# Patient Record
Sex: FEMALE | Race: WHITE | NOT HISPANIC OR LATINO
[De-identification: names, ages, dates, MRNs, and addresses within clinical notes are randomized per-mention and may not be internally consistent; named-entity substitution may affect disease eponyms.]

---

## 2017-01-03 ENCOUNTER — APPOINTMENT (OUTPATIENT)
Dept: CARDIOLOGY | Facility: CLINIC | Age: 57
End: 2017-01-03

## 2017-01-12 ENCOUNTER — MEDICATION RENEWAL (OUTPATIENT)
Age: 57
End: 2017-01-12

## 2017-02-07 ENCOUNTER — NON-APPOINTMENT (OUTPATIENT)
Age: 57
End: 2017-02-07

## 2017-02-07 ENCOUNTER — APPOINTMENT (OUTPATIENT)
Dept: CARDIOLOGY | Facility: CLINIC | Age: 57
End: 2017-02-07

## 2017-02-07 VITALS — SYSTOLIC BLOOD PRESSURE: 128 MMHG | DIASTOLIC BLOOD PRESSURE: 88 MMHG

## 2017-02-07 VITALS
SYSTOLIC BLOOD PRESSURE: 141 MMHG | HEIGHT: 61 IN | DIASTOLIC BLOOD PRESSURE: 101 MMHG | HEART RATE: 128 BPM | WEIGHT: 96 LBS | BODY MASS INDEX: 18.12 KG/M2 | OXYGEN SATURATION: 97 %

## 2017-02-20 ENCOUNTER — RX RENEWAL (OUTPATIENT)
Age: 57
End: 2017-02-20

## 2017-02-28 ENCOUNTER — APPOINTMENT (OUTPATIENT)
Dept: CARDIOLOGY | Facility: CLINIC | Age: 57
End: 2017-02-28

## 2017-03-24 ENCOUNTER — OUTPATIENT (OUTPATIENT)
Dept: OUTPATIENT SERVICES | Facility: HOSPITAL | Age: 57
LOS: 1 days | End: 2017-03-24
Payer: MEDICARE

## 2017-03-24 DIAGNOSIS — Z98.89 OTHER SPECIFIED POSTPROCEDURAL STATES: Chronic | ICD-10-CM

## 2017-03-24 DIAGNOSIS — R63.4 ABNORMAL WEIGHT LOSS: ICD-10-CM

## 2017-03-24 DIAGNOSIS — Z90.49 ACQUIRED ABSENCE OF OTHER SPECIFIED PARTS OF DIGESTIVE TRACT: Chronic | ICD-10-CM

## 2017-03-24 DIAGNOSIS — K57.30 DIVERTICULOSIS OF LARGE INTESTINE WITHOUT PERFORATION OR ABSCESS WITHOUT BLEEDING: ICD-10-CM

## 2017-03-24 DIAGNOSIS — R11.2 NAUSEA WITH VOMITING, UNSPECIFIED: ICD-10-CM

## 2017-03-24 PROCEDURE — G0121: CPT

## 2017-04-13 ENCOUNTER — APPOINTMENT (OUTPATIENT)
Dept: CARDIOLOGY | Facility: CLINIC | Age: 57
End: 2017-04-13

## 2017-06-27 ENCOUNTER — RX RENEWAL (OUTPATIENT)
Age: 57
End: 2017-06-27

## 2017-12-18 ENCOUNTER — RX RENEWAL (OUTPATIENT)
Age: 57
End: 2017-12-18

## 2018-02-23 ENCOUNTER — EMERGENCY (EMERGENCY)
Facility: HOSPITAL | Age: 58
LOS: 1 days | Discharge: HOME | End: 2018-02-23
Attending: EMERGENCY MEDICINE | Admitting: EMERGENCY MEDICINE
Payer: MEDICARE

## 2018-02-23 VITALS
TEMPERATURE: 99 F | RESPIRATION RATE: 16 BRPM | OXYGEN SATURATION: 97 % | HEART RATE: 97 BPM | SYSTOLIC BLOOD PRESSURE: 147 MMHG | WEIGHT: 102.96 LBS | DIASTOLIC BLOOD PRESSURE: 88 MMHG

## 2018-02-23 VITALS
RESPIRATION RATE: 15 BRPM | HEART RATE: 72 BPM | DIASTOLIC BLOOD PRESSURE: 75 MMHG | TEMPERATURE: 98 F | SYSTOLIC BLOOD PRESSURE: 132 MMHG | OXYGEN SATURATION: 98 %

## 2018-02-23 DIAGNOSIS — Z98.89 OTHER SPECIFIED POSTPROCEDURAL STATES: Chronic | ICD-10-CM

## 2018-02-23 DIAGNOSIS — Z90.49 ACQUIRED ABSENCE OF OTHER SPECIFIED PARTS OF DIGESTIVE TRACT: Chronic | ICD-10-CM

## 2018-02-23 LAB
ALBUMIN SERPL ELPH-MCNC: 3.4 G/DL — SIGNIFICANT CHANGE UP (ref 3.3–5)
ALP SERPL-CCNC: 84 U/L — SIGNIFICANT CHANGE UP (ref 40–120)
ALT FLD-CCNC: 12 U/L — SIGNIFICANT CHANGE UP (ref 12–78)
ANION GAP SERPL CALC-SCNC: 13 MMOL/L — SIGNIFICANT CHANGE UP (ref 5–17)
AST SERPL-CCNC: 19 U/L — SIGNIFICANT CHANGE UP (ref 15–37)
BASOPHILS # BLD AUTO: 0.1 K/UL — SIGNIFICANT CHANGE UP (ref 0–0.2)
BASOPHILS NFR BLD AUTO: 1.4 % — SIGNIFICANT CHANGE UP (ref 0–2)
BILIRUB SERPL-MCNC: 0.3 MG/DL — SIGNIFICANT CHANGE UP (ref 0.2–1.2)
BUN SERPL-MCNC: 11 MG/DL — SIGNIFICANT CHANGE UP (ref 7–23)
CALCIUM SERPL-MCNC: 7.9 MG/DL — LOW (ref 8.5–10.1)
CHLORIDE SERPL-SCNC: 112 MMOL/L — HIGH (ref 96–108)
CO2 SERPL-SCNC: 26 MMOL/L — SIGNIFICANT CHANGE UP (ref 22–31)
CREAT SERPL-MCNC: 0.48 MG/DL — LOW (ref 0.5–1.3)
EOSINOPHIL # BLD AUTO: 0.1 K/UL — SIGNIFICANT CHANGE UP (ref 0–0.5)
EOSINOPHIL NFR BLD AUTO: 2.4 % — SIGNIFICANT CHANGE UP (ref 0–6)
GLUCOSE SERPL-MCNC: 96 MG/DL — SIGNIFICANT CHANGE UP (ref 70–99)
HCT VFR BLD CALC: 37.8 % — SIGNIFICANT CHANGE UP (ref 34.5–45)
HGB BLD-MCNC: 12.7 G/DL — SIGNIFICANT CHANGE UP (ref 11.5–15.5)
LYMPHOCYTES # BLD AUTO: 1.6 K/UL — SIGNIFICANT CHANGE UP (ref 1–3.3)
LYMPHOCYTES # BLD AUTO: 39.6 % — SIGNIFICANT CHANGE UP (ref 13–44)
MCHC RBC-ENTMCNC: 30.8 PG — SIGNIFICANT CHANGE UP (ref 27–34)
MCHC RBC-ENTMCNC: 33.6 GM/DL — SIGNIFICANT CHANGE UP (ref 32–36)
MCV RBC AUTO: 91.6 FL — SIGNIFICANT CHANGE UP (ref 80–100)
MONOCYTES # BLD AUTO: 0.3 K/UL — SIGNIFICANT CHANGE UP (ref 0–0.9)
MONOCYTES NFR BLD AUTO: 6.9 % — SIGNIFICANT CHANGE UP (ref 1–9)
NEUTROPHILS # BLD AUTO: 2 K/UL — SIGNIFICANT CHANGE UP (ref 1.8–7.4)
NEUTROPHILS NFR BLD AUTO: 49.8 % — SIGNIFICANT CHANGE UP (ref 43–77)
PLATELET # BLD AUTO: 249 K/UL — SIGNIFICANT CHANGE UP (ref 150–400)
POTASSIUM SERPL-MCNC: 4 MMOL/L — SIGNIFICANT CHANGE UP (ref 3.5–5.3)
POTASSIUM SERPL-SCNC: 4 MMOL/L — SIGNIFICANT CHANGE UP (ref 3.5–5.3)
PROT SERPL-MCNC: 6.4 G/DL — SIGNIFICANT CHANGE UP (ref 6–8.3)
RBC # BLD: 4.13 M/UL — SIGNIFICANT CHANGE UP (ref 3.8–5.2)
RBC # FLD: 11.9 % — SIGNIFICANT CHANGE UP (ref 10.3–14.5)
SODIUM SERPL-SCNC: 151 MMOL/L — HIGH (ref 135–145)
WBC # BLD: 3.9 K/UL — SIGNIFICANT CHANGE UP (ref 3.8–10.5)
WBC # FLD AUTO: 3.9 K/UL — SIGNIFICANT CHANGE UP (ref 3.8–10.5)

## 2018-02-23 PROCEDURE — 70450 CT HEAD/BRAIN W/O DYE: CPT

## 2018-02-23 PROCEDURE — 96375 TX/PRO/DX INJ NEW DRUG ADDON: CPT

## 2018-02-23 PROCEDURE — 36415 COLL VENOUS BLD VENIPUNCTURE: CPT

## 2018-02-23 PROCEDURE — 96374 THER/PROPH/DIAG INJ IV PUSH: CPT

## 2018-02-23 PROCEDURE — 85027 COMPLETE CBC AUTOMATED: CPT

## 2018-02-23 PROCEDURE — 70450 CT HEAD/BRAIN W/O DYE: CPT | Mod: 26

## 2018-02-23 PROCEDURE — 96361 HYDRATE IV INFUSION ADD-ON: CPT

## 2018-02-23 PROCEDURE — 99284 EMERGENCY DEPT VISIT MOD MDM: CPT | Mod: 25

## 2018-02-23 PROCEDURE — 80053 COMPREHEN METABOLIC PANEL: CPT

## 2018-02-23 PROCEDURE — 99284 EMERGENCY DEPT VISIT MOD MDM: CPT

## 2018-02-23 RX ORDER — KETOROLAC TROMETHAMINE 30 MG/ML
30 SYRINGE (ML) INJECTION ONCE
Qty: 0 | Refills: 0 | Status: DISCONTINUED | OUTPATIENT
Start: 2018-02-23 | End: 2018-02-23

## 2018-02-23 RX ORDER — KETOROLAC TROMETHAMINE 30 MG/ML
15 SYRINGE (ML) INJECTION ONCE
Qty: 0 | Refills: 0 | Status: DISCONTINUED | OUTPATIENT
Start: 2018-02-23 | End: 2018-02-23

## 2018-02-23 RX ORDER — ACETAMINOPHEN 500 MG
650 TABLET ORAL ONCE
Qty: 0 | Refills: 0 | Status: COMPLETED | OUTPATIENT
Start: 2018-02-23 | End: 2018-02-23

## 2018-02-23 RX ORDER — METOCLOPRAMIDE HCL 10 MG
10 TABLET ORAL ONCE
Qty: 0 | Refills: 0 | Status: COMPLETED | OUTPATIENT
Start: 2018-02-23 | End: 2018-02-23

## 2018-02-23 RX ORDER — SODIUM CHLORIDE 9 MG/ML
1000 INJECTION INTRAMUSCULAR; INTRAVENOUS; SUBCUTANEOUS ONCE
Qty: 0 | Refills: 0 | Status: COMPLETED | OUTPATIENT
Start: 2018-02-23 | End: 2018-02-23

## 2018-02-23 RX ORDER — METOCLOPRAMIDE HCL 10 MG
10 TABLET ORAL ONCE
Qty: 0 | Refills: 0 | Status: DISCONTINUED | OUTPATIENT
Start: 2018-02-23 | End: 2018-02-23

## 2018-02-23 RX ADMIN — Medication 650 MILLIGRAM(S): at 18:29

## 2018-02-23 RX ADMIN — Medication 125 MILLIGRAM(S): at 19:50

## 2018-02-23 RX ADMIN — Medication 10 MILLIGRAM(S): at 18:29

## 2018-02-23 RX ADMIN — SODIUM CHLORIDE 1000 MILLILITER(S): 9 INJECTION INTRAMUSCULAR; INTRAVENOUS; SUBCUTANEOUS at 18:29

## 2018-02-23 RX ADMIN — Medication 15 MILLIGRAM(S): at 19:50

## 2018-02-23 NOTE — ED PROVIDER NOTE - PLAN OF CARE
56 y/o female with extensive PMHx presents with c/o migraine x 1 week. Vomiting multiple times. Neurologically intact. Discussed with Dr. Villagomez, CT, labs, medication, and re-assess

## 2018-02-23 NOTE — ED PROVIDER NOTE - CARE PLAN
Assessment and plan of treatment:	58 y/o female with extensive PMHx presents with c/o migraine x 1 week. Vomiting multiple times. Neurologically intact. Discussed with Dr. Villagomez, CT, labs, medication, and re-assess Principal Discharge DX:	Migraine headache  Assessment and plan of treatment:	58 y/o female with extensive PMHx presents with c/o migraine x 1 week. Vomiting multiple times. Neurologically intact. Discussed with Dr. Villagomez, CT, labs, medication, and re-assess

## 2018-02-23 NOTE — ED PROVIDER NOTE - CRANIAL NERVE AND PUPILLARY EXAM
corneal reflex intact/peripheral vision intact/central vision intact/cranial nerves 2-12 intact/extra-ocular movements intact/tongue is midline

## 2018-02-23 NOTE — ED PROVIDER NOTE - OBJECTIVE STATEMENT
56 y/o female with PMHx of MVP, migraine, fibromyalgia, OA, neurogenic bladder, Hashimoto, gastroparesis, cushing, and DM presents today with c/o headache x 1 week. pt states she follows with Dr. Leeann Stauffer in which was diagnosed with migraines, last MRi years ago. pt describes generalized head ache sharp, worse with light, and currently 8/10. pt admits to taking zomig nasal spray for pain. pt states she hasn't received her botox injection. pt admits to vomiting multiple times but uncertain if from gastroparesis. pt denies fever, chills, LOC, seizure, neck stiffness, extremity numbness/weakness, syncope, Cp, SOB, or any other complaints.

## 2018-02-23 NOTE — ED ADULT NURSE NOTE - OBJECTIVE STATEMENT
Pt. is presenting to the ED with a migraine that has lasted a week. Denies vision changes or head injury. Pt. reports that she took her intranasal migraine medication(zonig) but that it is not helping break the migraine. Her neurologist advised her to come be seen in the emergency room. Patient reports two episodes of vomiting this morning and is light sensitive. Has a hx of gastroparesis, type two diabetes, adrenal insufficiency and thyroid disorder.

## 2018-02-23 NOTE — ED PROVIDER NOTE - MEDICAL DECISION MAKING DETAILS
58 y/o female with extensive PMHx presents with c/o migraine x 1 week. Vomiting multiple times. Neurologically intact. Ct scan normal. Pt admits to significant improvement of pain. Neurologically intact.  Pt educated on nature of condition. pt aware of symptoms to be cautious of warranting immediate return.

## 2018-02-23 NOTE — ED PROVIDER NOTE - ATTENDING CONTRIBUTION TO CARE
56 yo female hx of migraines c/o headache from all her stress (family member admitted to hospital) x 1 week, follows up with neurologist Leeann Stauffer, took her zomig without much relief.  No fever/chills, +photophobia.  No neck stiffness.  +nausea, no vomiting.  AAOx3, CTA b/l, RRR, Neuro/motor intact.  CT head negative.  Patient symptoms improved with reglan, solumedrol and toradol, will f/u with Dr. Stauffer.

## 2018-02-23 NOTE — ED ADULT NURSE NOTE - PMH
Acute kidney injury    Cervical radiculopathy    Cushing's syndrome    Diabetes    Fibromyalgia    Gastroparesis    GERD (gastroesophageal reflux disease)    Hypothyroidism    Kidney stone  left  Migraine    MVP (mitral valve prolapse)    Neurogenic bladder    Osteoarthritis    Raynaud's disease    Reflex sympathetic dystrophy    Thyroiditis  hashimottos  TMJ (dislocation of temporomandibular joint)

## 2018-02-23 NOTE — ED PROVIDER NOTE - CHPI ED SYMPTOMS NEG
no change in level of consciousness/no confusion/no fever/no blurred vision/no numbness/no dizziness

## 2018-03-22 ENCOUNTER — APPOINTMENT (OUTPATIENT)
Dept: CARDIOLOGY | Facility: CLINIC | Age: 58
End: 2018-03-22

## 2018-06-05 ENCOUNTER — OUTPATIENT (OUTPATIENT)
Dept: OUTPATIENT SERVICES | Facility: HOSPITAL | Age: 58
LOS: 1 days | Discharge: HOME | End: 2018-06-05
Payer: MEDICARE

## 2018-06-05 ENCOUNTER — RESULT REVIEW (OUTPATIENT)
Age: 58
End: 2018-06-05

## 2018-06-05 DIAGNOSIS — Z90.49 ACQUIRED ABSENCE OF OTHER SPECIFIED PARTS OF DIGESTIVE TRACT: Chronic | ICD-10-CM

## 2018-06-05 DIAGNOSIS — R63.4 ABNORMAL WEIGHT LOSS: ICD-10-CM

## 2018-06-05 DIAGNOSIS — Z98.89 OTHER SPECIFIED POSTPROCEDURAL STATES: Chronic | ICD-10-CM

## 2018-06-05 PROCEDURE — 88305 TISSUE EXAM BY PATHOLOGIST: CPT | Mod: 26

## 2018-06-05 PROCEDURE — 43239 EGD BIOPSY SINGLE/MULTIPLE: CPT

## 2018-06-05 PROCEDURE — 88312 SPECIAL STAINS GROUP 1: CPT

## 2018-06-05 PROCEDURE — 88312 SPECIAL STAINS GROUP 1: CPT | Mod: 26

## 2018-06-05 PROCEDURE — 88305 TISSUE EXAM BY PATHOLOGIST: CPT

## 2018-06-05 PROCEDURE — 45380 COLONOSCOPY AND BIOPSY: CPT | Mod: PT

## 2018-06-07 LAB — SURGICAL PATHOLOGY STUDY: SIGNIFICANT CHANGE UP

## 2018-08-22 ENCOUNTER — TRANSCRIPTION ENCOUNTER (OUTPATIENT)
Age: 58
End: 2018-08-22

## 2018-11-02 ENCOUNTER — OUTPATIENT (OUTPATIENT)
Dept: OUTPATIENT SERVICES | Facility: HOSPITAL | Age: 58
LOS: 1 days | End: 2018-11-02
Payer: MEDICARE

## 2018-11-02 ENCOUNTER — RESULT REVIEW (OUTPATIENT)
Age: 58
End: 2018-11-02

## 2018-11-02 DIAGNOSIS — K31.7 POLYP OF STOMACH AND DUODENUM: ICD-10-CM

## 2018-11-02 DIAGNOSIS — Z98.89 OTHER SPECIFIED POSTPROCEDURAL STATES: Chronic | ICD-10-CM

## 2018-11-02 DIAGNOSIS — Z90.49 ACQUIRED ABSENCE OF OTHER SPECIFIED PARTS OF DIGESTIVE TRACT: Chronic | ICD-10-CM

## 2018-11-02 PROCEDURE — 88305 TISSUE EXAM BY PATHOLOGIST: CPT | Mod: 26

## 2018-11-02 PROCEDURE — 43251 EGD REMOVE LESION SNARE: CPT

## 2018-11-02 PROCEDURE — 88312 SPECIAL STAINS GROUP 1: CPT | Mod: 26

## 2018-11-02 PROCEDURE — 88312 SPECIAL STAINS GROUP 1: CPT

## 2018-11-02 PROCEDURE — 43239 EGD BIOPSY SINGLE/MULTIPLE: CPT | Mod: XS

## 2018-11-02 PROCEDURE — 88305 TISSUE EXAM BY PATHOLOGIST: CPT

## 2018-11-02 PROCEDURE — 43237 ENDOSCOPIC US EXAM ESOPH: CPT

## 2018-11-06 LAB — SURGICAL PATHOLOGY STUDY: SIGNIFICANT CHANGE UP

## 2020-01-07 ENCOUNTER — OUTPATIENT (OUTPATIENT)
Dept: OUTPATIENT SERVICES | Facility: HOSPITAL | Age: 60
LOS: 1 days | End: 2020-01-07
Payer: MEDICARE

## 2020-01-07 ENCOUNTER — RESULT REVIEW (OUTPATIENT)
Age: 60
End: 2020-01-07

## 2020-01-07 DIAGNOSIS — K31.7 POLYP OF STOMACH AND DUODENUM: ICD-10-CM

## 2020-01-07 DIAGNOSIS — Z98.89 OTHER SPECIFIED POSTPROCEDURAL STATES: Chronic | ICD-10-CM

## 2020-01-07 DIAGNOSIS — Z90.49 ACQUIRED ABSENCE OF OTHER SPECIFIED PARTS OF DIGESTIVE TRACT: Chronic | ICD-10-CM

## 2020-01-07 PROCEDURE — 43239 EGD BIOPSY SINGLE/MULTIPLE: CPT

## 2020-01-07 PROCEDURE — 88305 TISSUE EXAM BY PATHOLOGIST: CPT

## 2020-01-07 PROCEDURE — 88305 TISSUE EXAM BY PATHOLOGIST: CPT | Mod: 26

## 2020-01-07 PROCEDURE — 88312 SPECIAL STAINS GROUP 1: CPT | Mod: 26

## 2020-01-07 PROCEDURE — 88312 SPECIAL STAINS GROUP 1: CPT

## 2020-01-08 LAB — SURGICAL PATHOLOGY STUDY: SIGNIFICANT CHANGE UP

## 2020-05-17 ENCOUNTER — TRANSCRIPTION ENCOUNTER (OUTPATIENT)
Age: 60
End: 2020-05-17

## 2020-06-09 ENCOUNTER — APPOINTMENT (OUTPATIENT)
Dept: CARDIOLOGY | Facility: CLINIC | Age: 60
End: 2020-06-09
Payer: MEDICARE

## 2020-06-09 ENCOUNTER — NON-APPOINTMENT (OUTPATIENT)
Age: 60
End: 2020-06-09

## 2020-06-09 VITALS
SYSTOLIC BLOOD PRESSURE: 168 MMHG | BODY MASS INDEX: 24.55 KG/M2 | HEART RATE: 116 BPM | OXYGEN SATURATION: 96 % | DIASTOLIC BLOOD PRESSURE: 90 MMHG | WEIGHT: 130 LBS | HEIGHT: 61 IN

## 2020-06-09 PROCEDURE — 93000 ELECTROCARDIOGRAM COMPLETE: CPT

## 2020-06-09 PROCEDURE — 99215 OFFICE O/P EST HI 40 MIN: CPT

## 2020-06-23 ENCOUNTER — APPOINTMENT (OUTPATIENT)
Dept: CARDIOLOGY | Facility: CLINIC | Age: 60
End: 2020-06-23
Payer: MEDICARE

## 2020-06-23 PROCEDURE — 93790 AMBL BP MNTR W/SW I&R: CPT

## 2020-06-24 ENCOUNTER — APPOINTMENT (OUTPATIENT)
Dept: CARDIOLOGY | Facility: CLINIC | Age: 60
End: 2020-06-24
Payer: MEDICARE

## 2020-06-24 PROCEDURE — 93306 TTE W/DOPPLER COMPLETE: CPT

## 2020-07-28 ENCOUNTER — APPOINTMENT (OUTPATIENT)
Dept: CARDIOLOGY | Facility: CLINIC | Age: 60
End: 2020-07-28

## 2020-08-03 ENCOUNTER — APPOINTMENT (OUTPATIENT)
Dept: CARDIOLOGY | Facility: CLINIC | Age: 60
End: 2020-08-03

## 2020-10-08 ENCOUNTER — APPOINTMENT (OUTPATIENT)
Dept: CARDIOLOGY | Facility: CLINIC | Age: 60
End: 2020-10-08
Payer: MEDICARE

## 2020-10-08 PROCEDURE — 93015 CV STRESS TEST SUPVJ I&R: CPT

## 2020-10-19 ENCOUNTER — NON-APPOINTMENT (OUTPATIENT)
Age: 60
End: 2020-10-19

## 2020-10-19 ENCOUNTER — APPOINTMENT (OUTPATIENT)
Dept: CARDIOLOGY | Facility: CLINIC | Age: 60
End: 2020-10-19
Payer: MEDICARE

## 2020-10-19 VITALS
WEIGHT: 122 LBS | OXYGEN SATURATION: 99 % | HEIGHT: 61 IN | BODY MASS INDEX: 23.03 KG/M2 | SYSTOLIC BLOOD PRESSURE: 164 MMHG | DIASTOLIC BLOOD PRESSURE: 98 MMHG | HEART RATE: 87 BPM

## 2020-10-19 PROCEDURE — 93000 ELECTROCARDIOGRAM COMPLETE: CPT

## 2020-10-19 PROCEDURE — 99215 OFFICE O/P EST HI 40 MIN: CPT

## 2020-10-19 RX ORDER — MEMANTINE HYDROCHLORIDE 28 MG/1
28 CAPSULE, EXTENDED RELEASE ORAL
Qty: 90 | Refills: 0 | Status: ACTIVE | COMMUNITY
Start: 2019-08-21

## 2020-10-22 ENCOUNTER — TRANSCRIPTION ENCOUNTER (OUTPATIENT)
Age: 60
End: 2020-10-22

## 2021-02-11 ENCOUNTER — TRANSCRIPTION ENCOUNTER (OUTPATIENT)
Age: 61
End: 2021-02-11

## 2021-07-01 ENCOUNTER — FORM ENCOUNTER (OUTPATIENT)
Age: 61
End: 2021-07-01

## 2021-07-02 ENCOUNTER — TRANSCRIPTION ENCOUNTER (OUTPATIENT)
Age: 61
End: 2021-07-02

## 2021-07-02 ENCOUNTER — RESULT REVIEW (OUTPATIENT)
Age: 61
End: 2021-07-02

## 2021-07-02 ENCOUNTER — EMERGENCY (EMERGENCY)
Facility: HOSPITAL | Age: 61
LOS: 1 days | Discharge: HOME | End: 2021-07-02
Attending: EMERGENCY MEDICINE | Admitting: EMERGENCY MEDICINE
Payer: MEDICARE

## 2021-07-02 VITALS
SYSTOLIC BLOOD PRESSURE: 119 MMHG | WEIGHT: 121.92 LBS | HEART RATE: 112 BPM | OXYGEN SATURATION: 98 % | RESPIRATION RATE: 16 BRPM | DIASTOLIC BLOOD PRESSURE: 90 MMHG | HEIGHT: 61 IN | TEMPERATURE: 96 F

## 2021-07-02 DIAGNOSIS — Z98.89 OTHER SPECIFIED POSTPROCEDURAL STATES: Chronic | ICD-10-CM

## 2021-07-02 DIAGNOSIS — Z90.49 ACQUIRED ABSENCE OF OTHER SPECIFIED PARTS OF DIGESTIVE TRACT: Chronic | ICD-10-CM

## 2021-07-02 LAB
ALBUMIN SERPL ELPH-MCNC: 2.9 G/DL — LOW (ref 3.3–5)
ALP SERPL-CCNC: 144 U/L — HIGH (ref 40–120)
ALT FLD-CCNC: 34 U/L — SIGNIFICANT CHANGE UP (ref 12–78)
ANION GAP SERPL CALC-SCNC: 9 MMOL/L — SIGNIFICANT CHANGE UP (ref 5–17)
APTT BLD: 33.5 SEC — SIGNIFICANT CHANGE UP (ref 27.5–35.5)
AST SERPL-CCNC: 13 U/L — LOW (ref 15–37)
BASOPHILS # BLD AUTO: 0.07 K/UL — SIGNIFICANT CHANGE UP (ref 0–0.2)
BASOPHILS NFR BLD AUTO: 0.7 % — SIGNIFICANT CHANGE UP (ref 0–2)
BILIRUB SERPL-MCNC: 0.5 MG/DL — SIGNIFICANT CHANGE UP (ref 0.2–1.2)
BUN SERPL-MCNC: 20 MG/DL — SIGNIFICANT CHANGE UP (ref 7–23)
CALCIUM SERPL-MCNC: 7.9 MG/DL — LOW (ref 8.5–10.1)
CHLORIDE SERPL-SCNC: 111 MMOL/L — HIGH (ref 96–108)
CO2 SERPL-SCNC: 25 MMOL/L — SIGNIFICANT CHANGE UP (ref 22–31)
CREAT SERPL-MCNC: 0.51 MG/DL — SIGNIFICANT CHANGE UP (ref 0.5–1.3)
EOSINOPHIL # BLD AUTO: 0.09 K/UL — SIGNIFICANT CHANGE UP (ref 0–0.5)
EOSINOPHIL NFR BLD AUTO: 0.9 % — SIGNIFICANT CHANGE UP (ref 0–6)
GLUCOSE SERPL-MCNC: 100 MG/DL — HIGH (ref 70–99)
HCT VFR BLD CALC: 44.2 % — SIGNIFICANT CHANGE UP (ref 34.5–45)
HGB BLD-MCNC: 14.4 G/DL — SIGNIFICANT CHANGE UP (ref 11.5–15.5)
HPIV3 RNA SPEC QL NAA+PROBE: DETECTED
IMM GRANULOCYTES NFR BLD AUTO: 1.2 % — SIGNIFICANT CHANGE UP (ref 0–1.5)
INR BLD: 1.15 RATIO — SIGNIFICANT CHANGE UP (ref 0.88–1.16)
LACTATE SERPL-SCNC: 1.4 MMOL/L — SIGNIFICANT CHANGE UP (ref 0.7–2)
LIDOCAIN IGE QN: 27 U/L — LOW (ref 73–393)
LYMPHOCYTES # BLD AUTO: 1.39 K/UL — SIGNIFICANT CHANGE UP (ref 1–3.3)
LYMPHOCYTES # BLD AUTO: 14.6 % — SIGNIFICANT CHANGE UP (ref 13–44)
MAGNESIUM SERPL-MCNC: 2.3 MG/DL — SIGNIFICANT CHANGE UP (ref 1.6–2.6)
MCHC RBC-ENTMCNC: 29.4 PG — SIGNIFICANT CHANGE UP (ref 27–34)
MCHC RBC-ENTMCNC: 32.6 GM/DL — SIGNIFICANT CHANGE UP (ref 32–36)
MCV RBC AUTO: 90.4 FL — SIGNIFICANT CHANGE UP (ref 80–100)
MONOCYTES # BLD AUTO: 0.69 K/UL — SIGNIFICANT CHANGE UP (ref 0–0.9)
MONOCYTES NFR BLD AUTO: 7.2 % — SIGNIFICANT CHANGE UP (ref 2–14)
NEUTROPHILS # BLD AUTO: 7.2 K/UL — SIGNIFICANT CHANGE UP (ref 1.8–7.4)
NEUTROPHILS NFR BLD AUTO: 75.4 % — SIGNIFICANT CHANGE UP (ref 43–77)
NRBC # BLD: 0 /100 WBCS — SIGNIFICANT CHANGE UP (ref 0–0)
PLATELET # BLD AUTO: 246 K/UL — SIGNIFICANT CHANGE UP (ref 150–400)
POTASSIUM SERPL-MCNC: 3.7 MMOL/L — SIGNIFICANT CHANGE UP (ref 3.5–5.3)
POTASSIUM SERPL-SCNC: 3.7 MMOL/L — SIGNIFICANT CHANGE UP (ref 3.5–5.3)
PROCALCITONIN SERPL-MCNC: 0.09 NG/ML — HIGH (ref 0–0.04)
PROT SERPL-MCNC: 6.8 G/DL — SIGNIFICANT CHANGE UP (ref 6–8.3)
PROTHROM AB SERPL-ACNC: 13.4 SEC — SIGNIFICANT CHANGE UP (ref 10.6–13.6)
RAPID RVP RESULT: DETECTED
RBC # BLD: 4.89 M/UL — SIGNIFICANT CHANGE UP (ref 3.8–5.2)
RBC # FLD: 13.3 % — SIGNIFICANT CHANGE UP (ref 10.3–14.5)
SARS-COV-2 RNA SPEC QL NAA+PROBE: SIGNIFICANT CHANGE UP
SODIUM SERPL-SCNC: 145 MMOL/L — SIGNIFICANT CHANGE UP (ref 135–145)
WBC # BLD: 9.55 K/UL — SIGNIFICANT CHANGE UP (ref 3.8–10.5)
WBC # FLD AUTO: 9.55 K/UL — SIGNIFICANT CHANGE UP (ref 3.8–10.5)

## 2021-07-02 PROCEDURE — 71275 CT ANGIOGRAPHY CHEST: CPT | Mod: 26,MA

## 2021-07-02 PROCEDURE — 93010 ELECTROCARDIOGRAM REPORT: CPT

## 2021-07-02 PROCEDURE — 99285 EMERGENCY DEPT VISIT HI MDM: CPT

## 2021-07-02 RX ORDER — SODIUM CHLORIDE 9 MG/ML
1700 INJECTION INTRAMUSCULAR; INTRAVENOUS; SUBCUTANEOUS ONCE
Refills: 0 | Status: COMPLETED | OUTPATIENT
Start: 2021-07-02 | End: 2021-07-02

## 2021-07-02 RX ORDER — ALBUTEROL 90 UG/1
2.5 AEROSOL, METERED ORAL ONCE
Refills: 0 | Status: COMPLETED | OUTPATIENT
Start: 2021-07-02 | End: 2021-07-02

## 2021-07-02 RX ADMIN — SODIUM CHLORIDE 1700 MILLILITER(S): 9 INJECTION INTRAMUSCULAR; INTRAVENOUS; SUBCUTANEOUS at 18:43

## 2021-07-02 RX ADMIN — SODIUM CHLORIDE 1700 MILLILITER(S): 9 INJECTION INTRAMUSCULAR; INTRAVENOUS; SUBCUTANEOUS at 20:43

## 2021-07-02 RX ADMIN — ALBUTEROL 2.5 MILLIGRAM(S): 90 AEROSOL, METERED ORAL at 21:08

## 2021-07-02 NOTE — ED PROVIDER NOTE - ENMT, MLM
Airway patent, Nasal mucosa clear. Mouth with dry mucosa. Diffuse erythema noted to posterior pharynx no exudate noted

## 2021-07-02 NOTE — ED ADULT NURSE NOTE - ED CARDIAC RATE
Detail Level: Detailed
Render Note In Bullet Format When Appropriate: No
Number Of Freeze-Thaw Cycles: 2 freeze-thaw cycles
Post-Care Instructions: I reviewed with the patient in detail post-care instructions. Patient is to wear sunprotection, and avoid picking at any of the treated lesions. Pt may apply Vaseline to crusted or scabbing areas.
Duration Of Freeze Thaw-Cycle (Seconds): 10
Consent: The patient's consent was obtained including but not limited to risks of crusting, scabbing, blistering, scarring, darker or lighter pigmentary change, recurrence, incomplete removal and infection.
tachycardic

## 2021-07-02 NOTE — ED ADULT NURSE REASSESSMENT NOTE - NS ED NURSE REASSESS COMMENT FT1
Assumed care of Pt from previous RN, c/o cough, SOB, sent from urgent care for left side pneumonia, currently sitting comfortably on stretcher with 2LNC, noted with occasional cough, 02 sat 100%, will continue to monitor closely.

## 2021-07-02 NOTE — ED PROVIDER NOTE - PSH
Post-Care Instructions: I reviewed with the patient in detail post-care instructions. Patient is to wear sunprotection, and avoid picking at any of the treated lesions. Pt may apply Vaseline to crusted or scabbing areas. Add 52 Modifier (Optional): no Duration Of Freeze Thaw-Cycle (Seconds): 15 Consent: The patient's verbal consent was obtained including but not limited to risks of crusting, scabbing, blistering, scarring, darker or lighter pigmentary change, recurrence, incomplete removal and infection. Number Of Freeze-Thaw Cycles: 1 freeze-thaw cycle Render Post-Care Instructions In Note?: yes Detail Level: Zone Medical Necessity Information: It is in your best interest to select a reason for this procedure from the list below. All of these items fulfill various CMS LCD requirements except the new and changing color options. S/P cholecystectomy    S/P knee surgery     Medical Necessity Clause: This procedure was medically necessary because the lesions that were treated were:

## 2021-07-02 NOTE — ED PROVIDER NOTE - OBJECTIVE STATEMENT
Pt is a 62 yo female who presents to the ED with a cc of SOB. PMHx of Acute kidney injury, Cervical radiculopathy, Cushing's syndrome, Diabetes, Fibromyalgia    Gastroparesis, GERD (gastroesophageal reflux disease), Hypothyroidism, Kidney stone  left, Migraine, MVP (mitral valve prolapse), Neurogenic bladder    Osteoarthritis, Raynaud's disease, Reflex sympathetic dystrophy, Thyroiditis  Hashimoto's, TMJ (dislocation of temporomandibular joint). Pt reports that on Sunday she had developed an irritation to her throat. Initially she thought that she may be developing pharyngitis. On Monday she reported that she developed a hoarse voice and then on Tuesday she developed a cough productive of green/yellow sputum. Pt reports that symptoms have continued. She further notes that she has been experiencing fatigue, subjective fevers, chills, worsening of her chronic nausea, chest discomfort with breathing, and exertional SOB. She followed up with urgent care today and was sent to the ED after x-ray relieved LLL pneumonia per reports. pt has no h/o COVID 19 and was vaccinated with Moderna. Denies V/D/C, CP at rest, abd pain, ext numbness

## 2021-07-02 NOTE — ED PROVIDER NOTE - CARE PLAN
Principal Discharge DX:	Parainfluenza  Secondary Diagnosis:	Viral pneumonia  Secondary Diagnosis:	Dehydration

## 2021-07-02 NOTE — ED PROVIDER NOTE - NSFOLLOWUPINSTRUCTIONS_ED_ALL_ED_FT
Return to the ED for any new or worsening symptoms  Take your medication as prescribed  Tessalon Perles per label instructions as needed for cough  Pro Air inhaler 2 puffs every 4-6 hours as needed for cough, shortness of breath   Tylenol per label instructions as needed for fever or myalgias  Follow up with your PMD within the week for a recheck      AMBULATORY CARE:    Parainfluenza is a very contagious type of viral respiratory infection. It is caused by any of the 4 kinds of human parainfluenza viruses. These viruses can cause croup, bronchitis, bronchiolitis, ear infections, or pneumonia in adults and children. Parainfluenza is easily spread when an infected person coughs, sneezes, or has close contact with others. Anyone can get a parainfluenza infection, but they are more common in infants and young children.    Common symptoms include the following:   •Fever      •A regular cough or a rough, barking cough      •Runny or stuffy nose      •Sore throat, sneezing, or ear pain      •Hoarseness, wheezing, or rapid, noisy, or labored breathing      •Drooling or trouble swallowing      •Irritability, decreased appetite, or diarrhea      Call your local emergency number (911 in the [place]) if:   •You have trouble breathing.          Call your doctor if:   •Your symptoms get worse or do not get better with medicine.      •You have questions or concerns about your condition or care.      Medicines: You may need any of the following:   •Acetaminophen decreases pain and fever. It is available without a doctor's order. Ask how much to take and how often to take it. Follow directions. Read the labels of all other medicines you are using to see if they also contain acetaminophen, or ask your doctor or pharmacist. Acetaminophen can cause liver damage if not taken correctly. Do not use more than 4 grams (4,000 milligrams) total of acetaminophen in one day.       •NSAIDs, such as ibuprofen, help decrease swelling, pain, and fever. This medicine is available with or without a doctor's order. NSAIDs can cause stomach bleeding or kidney problems in certain people. If you take blood thinner medicine, always ask if NSAIDs are safe for you. Always read the medicine label and follow directions. Do not give these medicines to children under 6 months of age without direction from your child's healthcare provider.      •Antivirals help fight a viral infection.      •Take your medicine as directed. Contact your healthcare provider if you think your medicine is not helping or if you have side effects. Tell him of her if you are allergic to any medicine. Keep a list of the medicines, vitamins, and herbs you take. Include the amounts, and when and why you take them. Bring the list or the pill bottles to follow-up visits. Carry your medicine list with you in case of an emergency.      Manage your symptoms:   •Rest as much as you can to help you recover.      •Use a cool mist humidifier to increase air moisture in your home. This may make it easier for you to breathe and help decrease your cough. A few minutes outside in the cool night air may help your breathing. Sitting in a bathroom filled with steam from a hot shower may also be helpful.      •Drink liquids as directed to help prevent dehydration. Ask how much liquid to drink each day and which liquids are best for you.      Prevent parainfluenza:   •Wash your hands often. Use soap and water every time you wash your hands. Rub your soapy hands together, lacing your fingers. Use the fingers of one hand to scrub under the nails of the other hand. Wash for at least 20 seconds. Rinse with warm, running water for several seconds. Then dry your hands with a clean towel or paper towel. Use germ-killing hand  if soap and water are not available. Do not touch your eyes, nose, or mouth without washing your hands first.   Handwashing           •Cover a sneeze or cough. Use a tissue that covers your mouth and nose. Throw the tissue away in a trash can right away. Use the bend of your arm if a tissue is not available. Wash your hands well with soap and water or use a hand . Do not stand close to anyone who is sneezing or coughing.      •Clean shared items with a germ-killing . Clean table surfaces, doorknobs, and light switches. Do not share towels, silverware, and dishes with people who are sick. Wash bed sheets, towels, silverware, and dishes with soap and water.      •Wear a mask over your mouth and nose if you are sick. The face mask may help protect others from becoming infected. Wear the mask when you are in common areas of your home or if you seek care from a healthcare provider.      •Stay away from others if you are sick. Stay at home until 24 hours after your fever and symptoms are gone.      •Ask about the pneumococcal vaccine. Stay up to date on the pneumococcal vaccine. This will decrease your risk for developing pneumonia in addition to the parainfluenza. Ask about any other vaccines you may need.             Follow up with your doctor as directed: Write down your questions so you remember to ask them during your visits. Return to the ED for any new or worsening symptoms  Take your medication as prescribed  Tessalon Perles per label instructions as needed for cough  Pro Air inhaler 2 puffs every 4-6 hours as needed for cough, shortness of breath   Zithromax per label instructions  Make sure to remain hydrated  Tylenol per label instructions as needed for fever or myalgias  Follow up with your PMD within the week for a recheck      AMBULATORY CARE:    Parainfluenza is a very contagious type of viral respiratory infection. It is caused by any of the 4 kinds of human parainfluenza viruses. These viruses can cause croup, bronchitis, bronchiolitis, ear infections, or pneumonia in adults and children. Parainfluenza is easily spread when an infected person coughs, sneezes, or has close contact with others. Anyone can get a parainfluenza infection, but they are more common in infants and young children.    Common symptoms include the following:   •Fever      •A regular cough or a rough, barking cough      •Runny or stuffy nose      •Sore throat, sneezing, or ear pain      •Hoarseness, wheezing, or rapid, noisy, or labored breathing      •Drooling or trouble swallowing      •Irritability, decreased appetite, or diarrhea      Call your local emergency number (911 in the [place]) if:   •You have trouble breathing.          Call your doctor if:   •Your symptoms get worse or do not get better with medicine.      •You have questions or concerns about your condition or care.      Medicines: You may need any of the following:   •Acetaminophen decreases pain and fever. It is available without a doctor's order. Ask how much to take and how often to take it. Follow directions. Read the labels of all other medicines you are using to see if they also contain acetaminophen, or ask your doctor or pharmacist. Acetaminophen can cause liver damage if not taken correctly. Do not use more than 4 grams (4,000 milligrams) total of acetaminophen in one day.       •NSAIDs, such as ibuprofen, help decrease swelling, pain, and fever. This medicine is available with or without a doctor's order. NSAIDs can cause stomach bleeding or kidney problems in certain people. If you take blood thinner medicine, always ask if NSAIDs are safe for you. Always read the medicine label and follow directions. Do not give these medicines to children under 6 months of age without direction from your child's healthcare provider.      •Antivirals help fight a viral infection.      •Take your medicine as directed. Contact your healthcare provider if you think your medicine is not helping or if you have side effects. Tell him of her if you are allergic to any medicine. Keep a list of the medicines, vitamins, and herbs you take. Include the amounts, and when and why you take them. Bring the list or the pill bottles to follow-up visits. Carry your medicine list with you in case of an emergency.      Manage your symptoms:   •Rest as much as you can to help you recover.      •Use a cool mist humidifier to increase air moisture in your home. This may make it easier for you to breathe and help decrease your cough. A few minutes outside in the cool night air may help your breathing. Sitting in a bathroom filled with steam from a hot shower may also be helpful.      •Drink liquids as directed to help prevent dehydration. Ask how much liquid to drink each day and which liquids are best for you.      Prevent parainfluenza:   •Wash your hands often. Use soap and water every time you wash your hands. Rub your soapy hands together, lacing your fingers. Use the fingers of one hand to scrub under the nails of the other hand. Wash for at least 20 seconds. Rinse with warm, running water for several seconds. Then dry your hands with a clean towel or paper towel. Use germ-killing hand  if soap and water are not available. Do not touch your eyes, nose, or mouth without washing your hands first.   Handwashing           •Cover a sneeze or cough. Use a tissue that covers your mouth and nose. Throw the tissue away in a trash can right away. Use the bend of your arm if a tissue is not available. Wash your hands well with soap and water or use a hand . Do not stand close to anyone who is sneezing or coughing.      •Clean shared items with a germ-killing . Clean table surfaces, doorknobs, and light switches. Do not share towels, silverware, and dishes with people who are sick. Wash bed sheets, towels, silverware, and dishes with soap and water.      •Wear a mask over your mouth and nose if you are sick. The face mask may help protect others from becoming infected. Wear the mask when you are in common areas of your home or if you seek care from a healthcare provider.      •Stay away from others if you are sick. Stay at home until 24 hours after your fever and symptoms are gone.      •Ask about the pneumococcal vaccine. Stay up to date on the pneumococcal vaccine. This will decrease your risk for developing pneumonia in addition to the parainfluenza. Ask about any other vaccines you may need.             Follow up with your doctor as directed: Write down your questions so you remember to ask them during your visits.

## 2021-07-02 NOTE — ED ADULT NURSE NOTE - OBJECTIVE STATEMENT
Patient is a 60yo female presenting to [facility] from [facility] Patient complaining of cough with shortness of breath fatigue for 3 days. Patient was sent to the ED for left lower lobe pneumonia Patient denies fever nausea vomiting diarrhea abdominal pain. Patient states that she has chest pain when she takes deep breathe.

## 2021-07-02 NOTE — ED ADULT TRIAGE NOTE - CHIEF COMPLAINT QUOTE
brought in by EMS from urgent care; shortness of breath and cough x 3 days. XR at urgent care reports left lower lobe pneumonia

## 2021-07-02 NOTE — ED PROVIDER NOTE - PATIENT PORTAL LINK FT
You can access the FollowMyHealth Patient Portal offered by [facility] by registering at the following website: [email or web address]. By joining [employer]’s FollowMyHealth portal, you will also be able to view your health information using other applications (apps) compatible with our system.

## 2021-07-02 NOTE — ED PROVIDER NOTE - RESPIRATORY, MLM
Breath sounds clear and equal bilaterally. Breath sounds clear and equal bilaterally with scattered rhonchi that clears with cough

## 2021-07-03 VITALS
TEMPERATURE: 97 F | HEART RATE: 91 BPM | DIASTOLIC BLOOD PRESSURE: 63 MMHG | RESPIRATION RATE: 16 BRPM | OXYGEN SATURATION: 96 % | SYSTOLIC BLOOD PRESSURE: 131 MMHG

## 2021-07-03 LAB
APPEARANCE UR: CLEAR — SIGNIFICANT CHANGE UP
BILIRUB UR-MCNC: NEGATIVE — SIGNIFICANT CHANGE UP
COLOR SPEC: YELLOW — SIGNIFICANT CHANGE UP
DIFF PNL FLD: ABNORMAL
GLUCOSE UR QL: NEGATIVE — SIGNIFICANT CHANGE UP
KETONES UR-MCNC: ABNORMAL
LEUKOCYTE ESTERASE UR-ACNC: NEGATIVE — SIGNIFICANT CHANGE UP
NITRITE UR-MCNC: NEGATIVE — SIGNIFICANT CHANGE UP
PH UR: 5 — SIGNIFICANT CHANGE UP (ref 5–8)
PROT UR-MCNC: 30 MG/DL
SP GR SPEC: 1 — LOW (ref 1.01–1.02)
UROBILINOGEN FLD QL: NEGATIVE — SIGNIFICANT CHANGE UP

## 2021-07-03 PROCEDURE — 85025 COMPLETE CBC W/AUTO DIFF WBC: CPT

## 2021-07-03 PROCEDURE — 0225U NFCT DS DNA&RNA 21 SARSCOV2: CPT

## 2021-07-03 PROCEDURE — 85730 THROMBOPLASTIN TIME PARTIAL: CPT

## 2021-07-03 PROCEDURE — 96361 HYDRATE IV INFUSION ADD-ON: CPT

## 2021-07-03 PROCEDURE — 96374 THER/PROPH/DIAG INJ IV PUSH: CPT | Mod: XU

## 2021-07-03 PROCEDURE — 81001 URINALYSIS AUTO W/SCOPE: CPT

## 2021-07-03 PROCEDURE — 84145 PROCALCITONIN (PCT): CPT

## 2021-07-03 PROCEDURE — 82962 GLUCOSE BLOOD TEST: CPT

## 2021-07-03 PROCEDURE — 83735 ASSAY OF MAGNESIUM: CPT

## 2021-07-03 PROCEDURE — 83690 ASSAY OF LIPASE: CPT

## 2021-07-03 PROCEDURE — 96375 TX/PRO/DX INJ NEW DRUG ADDON: CPT | Mod: XU

## 2021-07-03 PROCEDURE — 87040 BLOOD CULTURE FOR BACTERIA: CPT

## 2021-07-03 PROCEDURE — 99284 EMERGENCY DEPT VISIT MOD MDM: CPT | Mod: 25

## 2021-07-03 PROCEDURE — 94640 AIRWAY INHALATION TREATMENT: CPT

## 2021-07-03 PROCEDURE — 83605 ASSAY OF LACTIC ACID: CPT

## 2021-07-03 PROCEDURE — 93005 ELECTROCARDIOGRAM TRACING: CPT

## 2021-07-03 PROCEDURE — 87086 URINE CULTURE/COLONY COUNT: CPT

## 2021-07-03 PROCEDURE — 93010 ELECTROCARDIOGRAM REPORT: CPT

## 2021-07-03 PROCEDURE — 80053 COMPREHEN METABOLIC PANEL: CPT

## 2021-07-03 PROCEDURE — 36415 COLL VENOUS BLD VENIPUNCTURE: CPT

## 2021-07-03 PROCEDURE — 85610 PROTHROMBIN TIME: CPT

## 2021-07-03 PROCEDURE — 71275 CT ANGIOGRAPHY CHEST: CPT | Mod: MA

## 2021-07-03 RX ORDER — SODIUM CHLORIDE 9 MG/ML
1000 INJECTION INTRAMUSCULAR; INTRAVENOUS; SUBCUTANEOUS ONCE
Refills: 0 | Status: COMPLETED | OUTPATIENT
Start: 2021-07-03 | End: 2021-07-03

## 2021-07-03 RX ORDER — AZITHROMYCIN 500 MG/1
1 TABLET, FILM COATED ORAL
Qty: 3 | Refills: 0
Start: 2021-07-03 | End: 2021-07-05

## 2021-07-03 RX ORDER — ACETAMINOPHEN 500 MG
1000 TABLET ORAL ONCE
Refills: 0 | Status: COMPLETED | OUTPATIENT
Start: 2021-07-03 | End: 2021-07-03

## 2021-07-03 RX ORDER — ONDANSETRON 8 MG/1
4 TABLET, FILM COATED ORAL ONCE
Refills: 0 | Status: COMPLETED | OUTPATIENT
Start: 2021-07-03 | End: 2021-07-03

## 2021-07-03 RX ORDER — ALBUTEROL 90 UG/1
2 AEROSOL, METERED ORAL
Qty: 240 | Refills: 0
Start: 2021-07-03 | End: 2021-08-01

## 2021-07-03 RX ADMIN — Medication 400 MILLIGRAM(S): at 00:23

## 2021-07-03 RX ADMIN — SODIUM CHLORIDE 1000 MILLILITER(S): 9 INJECTION INTRAMUSCULAR; INTRAVENOUS; SUBCUTANEOUS at 01:12

## 2021-07-03 RX ADMIN — SODIUM CHLORIDE 1000 MILLILITER(S): 9 INJECTION INTRAMUSCULAR; INTRAVENOUS; SUBCUTANEOUS at 00:11

## 2021-07-03 RX ADMIN — Medication 100 MILLIGRAM(S): at 00:09

## 2021-07-03 RX ADMIN — ONDANSETRON 4 MILLIGRAM(S): 8 TABLET, FILM COATED ORAL at 00:10

## 2021-07-05 LAB
CULTURE RESULTS: SIGNIFICANT CHANGE UP
SPECIMEN SOURCE: SIGNIFICANT CHANGE UP

## 2021-07-08 LAB
CULTURE RESULTS: SIGNIFICANT CHANGE UP
CULTURE RESULTS: SIGNIFICANT CHANGE UP
SPECIMEN SOURCE: SIGNIFICANT CHANGE UP
SPECIMEN SOURCE: SIGNIFICANT CHANGE UP

## 2021-07-26 ENCOUNTER — RX RENEWAL (OUTPATIENT)
Age: 61
End: 2021-07-26

## 2021-11-18 NOTE — ED ADULT NURSE NOTE - NSSEPSISSUSPECTED_ED_A_ED
Pt is requesting an appt with  to help fill out some forms. Please call pt back to to help schedule appt. Thank you.    Post-Care Instructions: I reviewed with the patient in detail post-care instructions. Patient is to wear sunprotection, and avoid picking at any of the treated lesions. Pt may apply Vaseline to crusted or scabbing areas. Render Note In Bullet Format When Appropriate: No Duration Of Freeze Thaw-Cycle (Seconds): 5 Consent: The patient's consent was obtained including but not limited to risks of crusting, scabbing, blistering, scarring, darker or lighter pigmentary change, recurrence, and incomplete removal. Detail Level: Detailed Render Post-Care Instructions In Note?: yes Number Of Freeze-Thaw Cycles: 1 freeze-thaw cycle No

## 2022-01-04 NOTE — ED ADULT NURSE NOTE - GENITOURINARY ASSESSMENT
Writer left a message for parent/guardian to return call to Central Sched[employer].  Patient needs to be rescheduled with a therapist, writer scheduled with Jessi Trevizo in error.  
WDL

## 2022-01-13 ENCOUNTER — RX RENEWAL (OUTPATIENT)
Age: 62
End: 2022-01-13

## 2022-04-21 ENCOUNTER — RX RENEWAL (OUTPATIENT)
Age: 62
End: 2022-04-21

## 2022-06-08 ENCOUNTER — APPOINTMENT (OUTPATIENT)
Dept: CARDIOLOGY | Facility: CLINIC | Age: 62
End: 2022-06-08
Payer: MEDICARE

## 2022-06-08 ENCOUNTER — NON-APPOINTMENT (OUTPATIENT)
Age: 62
End: 2022-06-08

## 2022-06-08 VITALS
OXYGEN SATURATION: 94 % | HEART RATE: 92 BPM | DIASTOLIC BLOOD PRESSURE: 83 MMHG | WEIGHT: 160 LBS | SYSTOLIC BLOOD PRESSURE: 149 MMHG | HEIGHT: 61 IN | BODY MASS INDEX: 30.21 KG/M2

## 2022-06-08 DIAGNOSIS — E55.9 VITAMIN D DEFICIENCY, UNSPECIFIED: ICD-10-CM

## 2022-06-08 DIAGNOSIS — R00.2 PALPITATIONS: ICD-10-CM

## 2022-06-08 DIAGNOSIS — E11.9 TYPE 2 DIABETES MELLITUS W/OUT COMPLICATIONS: ICD-10-CM

## 2022-06-08 PROCEDURE — 93000 ELECTROCARDIOGRAM COMPLETE: CPT

## 2022-06-08 PROCEDURE — 99215 OFFICE O/P EST HI 40 MIN: CPT

## 2022-06-08 RX ORDER — MULTIVIT-MIN/FOLIC/VIT K/LYCOP 400-300MCG
50 MCG TABLET ORAL DAILY
Refills: 0 | Status: ACTIVE | COMMUNITY

## 2022-06-08 RX ORDER — DENOSUMAB 60 MG/ML
60 INJECTION SUBCUTANEOUS
Refills: 1 | Status: ACTIVE | COMMUNITY

## 2022-06-08 RX ORDER — HYDROMORPHONE HYDROCHLORIDE 4 MG/1
4 TABLET ORAL
Refills: 0 | Status: ACTIVE | COMMUNITY

## 2022-06-08 RX ORDER — PREGABALIN 100 MG/1
100 CAPSULE ORAL 3 TIMES DAILY
Refills: 0 | Status: ACTIVE | COMMUNITY

## 2022-06-08 RX ORDER — FAMOTIDINE 40 MG/1
40 TABLET, FILM COATED ORAL
Qty: 90 | Refills: 0 | Status: DISCONTINUED | COMMUNITY
Start: 2020-07-09 | End: 2022-06-08

## 2022-06-08 RX ORDER — FAMOTIDINE 20 MG/1
20 TABLET, FILM COATED ORAL
Qty: 180 | Refills: 0 | Status: ACTIVE | COMMUNITY
Start: 2021-05-19

## 2022-06-08 NOTE — PHYSICAL EXAM
[General Appearance - In No Acute Distress] : no acute distress [Normal Conjunctiva] : the conjunctiva exhibited no abnormalities [No Oral Pallor] : no oral pallor [] : no respiratory distress [Respiration, Rhythm And Depth] : normal respiratory rhythm and effort [Auscultation Breath Sounds / Voice Sounds] : lungs were clear to auscultation bilaterally [Bowel Sounds] : normal bowel sounds [Abdomen Tenderness] : non-tender [Abnormal Walk] : normal gait [Oriented To Time, Place, And Person] : oriented to person, place, and time [Not Palpable] : not palpable [No Precordial Heave] : no precordial heave was noted [Tachycardia] : tachycardic [Rhythm Regular] : regular [Normal S1] : normal S1 [Normal S2] : normal S2 [No Gallop] : no gallop heard [I] : a grade 1 [2+] : left 2+ [1+] : left 1+ [No Abnormalities] : the abdominal aorta was not enlarged and no bruit was heard [No Pitting Edema] : no pitting edema present [FreeTextEntry1] : mild reddening of left foot with small blisters at tips of 2 toes [Apical Thrill] : no thrill palpable at the apex [Click] : no click [Pericardial Rub] : no pericardial rub [Right Carotid Bruit] : no bruit heard over the right carotid [Left Carotid Bruit] : no bruit heard over the left carotid

## 2022-06-08 NOTE — HISTORY OF PRESENT ILLNESS
[FreeTextEntry1] : Ms. Flora Mcclain presented to the office today for follow-up cardiac evaluation. I last evaluated the patient in the office on 10/19/20.\par \par The patient is a 62-year-old female with a history of sinus tachycardia, mild mitral regurgitation, ventricular ectopy, palpitations, a dyslipidemia, diabetes, hypothyroidism, gastroparesis, gastric polyps, GERD, colon polyps, Raynaud's disease, reflex sympathetic dystrophy, fibromyalgia, and osteoporosis. \par \par The patient reports having recently noted an increase in her usual degree of dyspnea on exertion, noting that she ambulates with a cane.  She has not experienced any symptoms of chest discomfort in association with her activities.  She sleeps with her head elevated secondary to GERD, however, she does not describe having experienced orthopnea or paroxysmal nocturnal dyspnea. She reports experiencing a sensation of mild tachycardia in association with climbing stairs, however, not under any other circumstances.. She has not experienced any episodes of syncope. She has noted a similar degree of mild lower extremity swelling, as well as blisters on the toes of her left foot (attributed to Raynaud's disease).\par \par Exercise stress testing most recently performed on 10/8/20 revealed the patient to exhibit reduced exercise tolerance, without the inducement of cardiac symptoms. He was baseline sinus tachycardia with an exaggerated heart rate response to exercise, compatible with decreased cardiovascular conditioning. The study was negative for electrocardiographic evidence of myocardial ischemia. Rare ventricular premature contractions were exhibited.\par \par Echocardiography performed on 6/24/20 revealed normal cardiac chamber sizes with normal left ventricular wall thickness and wall motion. Left ventricular systolic function was normal, with an estimated ejection fraction of 60%. Mild left ventricular diastolic dysfunction was demonstrated. Mild mitral regurgitation and mild tricuspid regurgitation were demonstrated, with an estimated right ventricular systolic pressure of 35 mm mercury.\par \par A  24-hour ambulatory blood pressure monitoring study performed from 6/23/20 - 6/24/20 revealed the average blood pressure reading to be 122/74 with 80% of systolic readings and 8% of all diastolic readings being above the normal range.\par \par Laboratory studies performed through the office of the patient's endocrinologist on 12/29/2021 revealed cholesterol 234, triglycerides 408, and HDL 34.  The LDL level was not calculated secondary to significant hypertriglyceridemia.  The glucose level was 149 and the hemoglobin A1c level was 5.4%.  The liver chemistries were normal.  The BUN and creatinine were 17 and 0.87, respectively.  The potassium level was 4.0.  The emoglobin and hematocrit were 13.3 and 39.9, respectively.  The TSH level was 1.70.  The vitamin D level was 13.2 (vitamin D supplementation was subsequently initiated).\par \par Previous History:\par \par The patient reports having "coded" when she was hospitalized with sepsis in July of 2009, describing having exhibited hypotension at that time.\par \par As far as risk factors for coronary artery disease are concerned, the patient has a history of a dyslipidemia. She does not have a history of hypertension or diabetes. She denies a history of cigarette smoking. She has a family history of coronary artery disease in both of her parents.\par \par The patient was evaluated by an electrophysiologist, Dr. Mike Marie, for palpitations/flutters, for which she underwent cardiac rhythm event monitoring, beginning on 12/28/15. Her symptoms correlated with sinus rhythm on all (multiple) occasions.

## 2022-06-08 NOTE — CARDIOLOGY SUMMARY
[de-identified] : 6/8/22 -sinus rhythm at a rate of 91 bpm.  Nonspecific diminished voltage in leads V4-V6.

## 2022-06-08 NOTE — DISCUSSION/SUMMARY
[FreeTextEntry1] : The patient reports having recently noted an increase in her usual degree of dyspnea on exertion, noting that she ambulates with a cane.  Her cardiac examination today is remarkable for a soft systolic ejection murmur, unchanged from her previous visit with me. She is not exhibiting lower extremity swelling today. Her blood pressure reading today is satisfactory.  Her weight today is 160 pounds, representing a 38 pound weight gain since her previous visit here on 10/19/2020.  Her electrocardiogram today reveals sinus rhythm with non-specific diminished voltage in leads V4-V6, essentially unchanged from her previous office tracing, allowing for lead placement variation.\par \par I suspect that the increase in the patient's usual degree of dyspnea on exertion may be related to her significant weight gain and decreased cardiovascular conditioning.  Nonetheless, she does have multiple cardiovascular risk factors, and therefore, the possibility of an anginal syndrome needs to be considered. I am referring the patient for exercise stress testing to evaluate for the presence of exercised-induced myocardial ischemia and/or arrhythmias.  Echocardiography will be performed to assess cardiac structural integrity, left ventricular function, valvular morphology and function, and the pulmonary artery systolic pressure.  The patient is going to make arrangements to have the studies performed through our office, and I will telephone her to discuss the findings, once the studies have been completed.\par \par I instructed the patient to continue her cardiac medications as currently prescribed for the time being.\par \par I have reviewed the findings of the blood test report of 12/9/2021 in detail with the patient today.  She anticipates having follow-up blood testing performed through the office of her endocrinologist in the near future, and I have asked the patient to have a copy of her next fasting lipid profile and chemistry screen results forwarded to my office for my review and records.  I have recommended that a direct LDL measurement be included with this next blood test, noting that the LDL level was not calculated on the blood test report of 12/9/2021 secondary to significant hypertriglyceridemia.  If significant hypertriglyceridemia is noted on the upcoming blood test, a triglyceride-lowering medication will be recommended to the patient.\par \par The importance of proper dietary habits, proper weight maintenance, and regular exercise as tolerated was discussed with the patient today.\par \par I have asked the patient to call me if she should have any questions or problems pertaining to these matters, and especially if she should experience any concerning symptoms. I have otherwise asked her to return to the office for follow-up cardiac evaluation in 3 months, provided she remains clinically stable in the interim, and the findings on the upcoming cardiac studies do not warrant sooner evaluation and/or treatment.

## 2022-07-01 ENCOUNTER — APPOINTMENT (OUTPATIENT)
Dept: CARDIOLOGY | Facility: CLINIC | Age: 62
End: 2022-07-01

## 2022-08-26 ENCOUNTER — APPOINTMENT (OUTPATIENT)
Dept: CARDIOLOGY | Facility: CLINIC | Age: 62
End: 2022-08-26

## 2022-08-26 PROCEDURE — 93306 TTE W/DOPPLER COMPLETE: CPT

## 2022-10-17 ENCOUNTER — RX RENEWAL (OUTPATIENT)
Age: 62
End: 2022-10-17

## 2022-10-18 ENCOUNTER — NON-APPOINTMENT (OUTPATIENT)
Age: 62
End: 2022-10-18

## 2022-11-23 ENCOUNTER — APPOINTMENT (OUTPATIENT)
Dept: PULMONOLOGY | Facility: CLINIC | Age: 62
End: 2022-11-23

## 2022-11-23 VITALS
HEART RATE: 80 BPM | BODY MASS INDEX: 27.4 KG/M2 | SYSTOLIC BLOOD PRESSURE: 192 MMHG | DIASTOLIC BLOOD PRESSURE: 84 MMHG | WEIGHT: 145 LBS | OXYGEN SATURATION: 95 %

## 2022-11-23 DIAGNOSIS — R05.9 COUGH, UNSPECIFIED: ICD-10-CM

## 2022-11-23 DIAGNOSIS — R93.89 ABNORMAL FINDINGS ON DIAGNOSTIC IMAGING OF OTHER SPECIFIED BODY STRUCTURES: ICD-10-CM

## 2022-11-23 PROCEDURE — 94729 DIFFUSING CAPACITY: CPT

## 2022-11-23 PROCEDURE — 94060 EVALUATION OF WHEEZING: CPT

## 2022-11-23 PROCEDURE — 95012 NITRIC OXIDE EXP GAS DETER: CPT

## 2022-11-23 PROCEDURE — 94727 GAS DIL/WSHOT DETER LNG VOL: CPT

## 2022-11-23 PROCEDURE — ZZZZZ: CPT

## 2022-11-23 PROCEDURE — 99203 OFFICE O/P NEW LOW 30 MIN: CPT | Mod: 25

## 2022-11-23 NOTE — PROCEDURE
[FreeTextEntry1] : CT abdomen and pelvis October 2022 reviewed multiple less than 6 mm pulmonary nodules noted on the report not appreciated on personal review of imaging\par \par EXAM: CT ANGIO CHEST PULM ART WAWIC\par \par \par PROCEDURE DATE: 07/02/2021\par \par INTERPRETATION: CLINICAL INFORMATION: Chest pain. Question pulmonary embolism\par \par COMPARISON: Chest x-ray 7/2/2021. CT abdomen and pelvis 8/1/2014\par \par CONTRAST/COMPLICATIONS:\par IV Contrast: Omnipaque 350 65 cc administered 35 cc discarded\par Oral Contrast: NONE\par Complications: None reported at time of study completion\par \par PROCEDURE:\par CT Angiography of the Chest.\par Sagittal and coronal reformats were performed as well as 3D (MIP) reconstructions.\par \par FINDINGS:\par \par LUNGS AND AIRWAYS: Patent central airways.\par Patchy airspace opacities throughout the lungs but most prominent in the left upper lobe. Findings are suspicious for infection. Correlate clinically and obtain follow-up to ensure resolution.\par 3 mm peripheral nodule right lower lobe (2:51).\par 2 mm calcified granuloma left lower lobe.\par \par \par PLEURA: No pleural effusion.\par MEDIASTINUM AND MARYURI: No lymphadenopathy.\par VESSELS: Limited evaluation for pulmonary embolism secondary to poor opacification of the pulmonary arteries. No central or proximal segmental pulmonary embolism. Distal to this evaluation is markedly limited.\par HEART: Heart size is normal. No pericardial effusion.\par CHEST WALL AND LOWER NECK: Within normal limits.\par VISUALIZED UPPER ABDOMEN: Within normal limits.\par BONES: Within normal limits.\par \par IMPRESSION:\par \par Limited evaluation for pulmonary embolism secondary to poor opacification of the pulmonary arteries. No central or proximal segmental pulmonary embolism. Distal to this evaluation is markedly limited.\par \par Patchy airspace opacities throughout the lungs but most prominent in the left upper lobe. Findings are suspicious for infection. Correlate clinically and obtain follow-up to ensure resolution.\par \par \par PFT normal\par \par \par \par

## 2022-11-23 NOTE — ASSESSMENT
[FreeTextEntry1] : Findings on recent abdominal CT scan very nonspecific and do not have great concern about them.  However findings on the chest CT from 2021 should have follow-up does show patchy infiltrates that should have follow-up evaluation.  PFTs are normal

## 2022-11-23 NOTE — HISTORY OF PRESENT ILLNESS
[Never] : never [TextBox_4] : Noted pulmonary abnormality on recent abdominal CT\par \par Does have history of intermittent respiratory symptoms in the past.  Lifelong non-smoker.\par Denies fever chills or weight loss appetite good.  Does report chronic cough.

## 2022-12-13 ENCOUNTER — NON-APPOINTMENT (OUTPATIENT)
Age: 62
End: 2022-12-13

## 2022-12-13 ENCOUNTER — APPOINTMENT (OUTPATIENT)
Dept: CARDIOLOGY | Facility: CLINIC | Age: 62
End: 2022-12-13

## 2022-12-13 VITALS
DIASTOLIC BLOOD PRESSURE: 80 MMHG | OXYGEN SATURATION: 96 % | HEIGHT: 61 IN | BODY MASS INDEX: 27.56 KG/M2 | SYSTOLIC BLOOD PRESSURE: 204 MMHG | WEIGHT: 146 LBS | HEART RATE: 70 BPM

## 2022-12-13 VITALS — DIASTOLIC BLOOD PRESSURE: 90 MMHG | SYSTOLIC BLOOD PRESSURE: 190 MMHG

## 2022-12-13 PROCEDURE — 99214 OFFICE O/P EST MOD 30 MIN: CPT

## 2022-12-13 PROCEDURE — 93000 ELECTROCARDIOGRAM COMPLETE: CPT

## 2022-12-13 NOTE — HISTORY OF PRESENT ILLNESS
[FreeTextEntry1] : Ms. Flora Mcclain presented to the office today for follow-up cardiac evaluation. Dr Castaneda last evaluated the patient in the office on 6/8/22.\par \par Shortly after her last visit with Dr. Castaneda, she went to [place] and when she got there she felt very nauseous and tired after eating dinner out one night.  She was found to be in septic shock and was brought to [facility].  At the hospital they did not have an ICU bed so they transferred her to [facility] which is in [place], [place].  She was in the hospital for 5 days.\par \par The patient is a 62-year-old female with a history of sinus tachycardia, mild mitral regurgitation, ventricular ectopy, palpitations, a dyslipidemia, diabetes, hypothyroidism, gastroparesis, gastric polyps, GERD, colon polyps, Raynaud's disease, reflex sympathetic dystrophy, fibromyalgia, and osteoporosis. \par \par At her last visit with Dr Castaneda, the patient reports having  noted an increase in her usual degree of dyspnea on exertion, noting that she ambulates with a cane.  She had not experienced any symptoms of chest discomfort in association with her activities.  She sleeps with her head elevated secondary to GERD, however, she does not describe having experienced orthopnea or paroxysmal nocturnal dyspnea. She reported experiencing a sensation of mild tachycardia in association with climbing stairs, however, not under any other circumstances.. She has not experienced any episodes of syncope. She had noted a similar degree of mild lower extremity swelling, as well as blisters on the toes of her left foot (attributed to Raynaud's disease).\par \par Exercise stress testing most recently performed on 10/8/20 revealed the patient to exhibit reduced exercise tolerance, without the inducement of cardiac symptoms. He was baseline sinus tachycardia with an exaggerated heart rate response to exercise, compatible with decreased cardiovascular conditioning. The study was negative for electrocardiographic evidence of myocardial ischemia. Rare ventricular premature contractions were exhibited.\par \par Echocardiography performed on 6/24/20 revealed normal cardiac chamber sizes with normal left ventricular wall thickness and wall motion. Left ventricular systolic function was normal, with an estimated ejection fraction of 60%. Mild left ventricular diastolic dysfunction was demonstrated. Mild mitral regurgitation and mild tricuspid regurgitation were demonstrated, with an estimated right ventricular systolic pressure of 35 mm mercury.\par \par A  24-hour ambulatory blood pressure monitoring study performed from 6/23/20 - 6/24/20 revealed the average blood pressure reading to be 122/74 with 80% of systolic readings and 8% of all diastolic readings being above the normal range.\par \par Laboratory studies performed through the office of the patient's endocrinologist on 10/8/2022 revealed a cholesterol of 277, triglycerides 198 and HDL 42.  The LDL was 195.  The glucose was 93 and the hemoglobin A1c was 5.7.  The liver enzymes were normal except for the alkaline phosphatase which was 123.  The upper level of alkaline phosphatase is 120.  The BUN and creatinine were 20 and 0.73, respectively.  The potassium level was 4.5.  The T SH was 0.09 with a normal range being 0.270-4.20.\par \par Laboratory studies performed through the office of the patient's endocrinologist on 12/29/2021 revealed cholesterol 234, triglycerides 408, and HDL 34.  The LDL level was not calculated secondary to significant hypertriglyceridemia.  The glucose level was 149 and the hemoglobin A1c level was 5.4%.  The liver chemistries were normal.  The BUN and creatinine were 17 and 0.87, respectively.  The potassium level was 4.0.  The hemoglobin and hematocrit were 13.3 and 39.9, respectively.  The TSH level was 1.70.  The vitamin D level was 13.2 (vitamin D supplementation was subsequently initiated).\par \par Her blood pressure initially today was 204/80 and dropped to 190/90 by the end of the visit.\par \par Previous History:\par \par The patient reports having "coded" when she was hospitalized with sepsis in July of 2009, describing having exhibited hypotension at that time.\par \par As far as risk factors for coronary artery disease are concerned, the patient has a history of a dyslipidemia. She does not have a history of hypertension or diabetes. She denies a history of cigarette smoking. She has a family history of coronary artery disease in both of her parents.\par \par The patient was evaluated by an electrophysiologist, Dr. Mike Marie, for palpitations/flutters, for which she underwent cardiac rhythm event monitoring, beginning on 12/28/15. Her symptoms correlated with sinus rhythm on all (multiple) occasions.

## 2022-12-13 NOTE — DISCUSSION/SUMMARY
[FreeTextEntry1] : At her last visit, the patient reported having recently noted an increase in her usual degree of dyspnea on exertion, noting that she ambulates with a cane. Her complaint today is her migraines have increased and so have her blood pressure readings. Her cardiac examination today is remarkable for a soft systolic ejection murmur, unchanged from her previous visit with Dr Castaneda.. She is not exhibiting lower extremity swelling today, or shortness of breath. \par Her blood pressure reading today is markedly hypertensive at 204/80 initially and dropped to only 190/90 by the end of the visit.  Her weight today is 146 pounds, representing a 14 pound weight loss since her previous visit here on 6/8/22.  Her electrocardiogram today reveals sinus rhythm , essentially unchanged from her previous office tracing, allowing for lead placement variation.\par \par She does have multiple cardiovascular risk factors, and therefore I am adding Amlodipine 5mg QD for better blood pressure control. I have also asked her to purchase a home blood pressure monitoring machine. When her blood pressure is within acceptable parameters,  I am referring the patient for exercise stress testing to evaluate for the presence of exercised-induced myocardial ischemia and/or arrhythmias.  Echocardiography will be performed to assess cardiac structural integrity, left ventricular function, valvular morphology and function, and the pulmonary artery systolic pressure. A carotid doppler was ordered to assess for the degree of carotid atherosclerosis formation.\par   The patient is going to make arrangements to have the studies performed through our office when her blood pressure is normalozed, and I will telephone her to discuss the findings, once the studies have been completed.\par \par I instructed the patient to continue her cardiac medications as currently prescribed for the time being.\par \par Dr Castaneda reviewed the findings of the blood test report of 12/9/2021 in detail with the patient at her last office visit. I am switching her Atorvastatin 20mg to Crestor 10mg for better hyperlipidemia control.\par  In addition If significant hypertriglyceridemia is noted on the next blood test, a triglyceride-lowering medication will be recommended to the patient.\par \par The importance of proper dietary habits, proper weight maintenance, and regular exercise as tolerated was discussed with the patient today.\par \par I have asked the patient to call me if she should have any questions or problems pertaining to these matters, and especially if she should experience any concerning symptoms. I have otherwise asked her to return to the office for follow-up cardiac evaluation in 1 week for blood pressure evaluation.

## 2022-12-13 NOTE — PHYSICAL EXAM
[General Appearance - In No Acute Distress] : no acute distress [Normal Conjunctiva] : the conjunctiva exhibited no abnormalities [No Oral Pallor] : no oral pallor [] : no respiratory distress [Respiration, Rhythm And Depth] : normal respiratory rhythm and effort [Auscultation Breath Sounds / Voice Sounds] : lungs were clear to auscultation bilaterally [Bowel Sounds] : normal bowel sounds [Abdomen Tenderness] : non-tender [Oriented To Time, Place, And Person] : oriented to person, place, and time [Not Palpable] : not palpable [No Precordial Heave] : no precordial heave was noted [Rhythm Regular] : regular [Normal S1] : normal S1 [Normal S2] : normal S2 [No Gallop] : no gallop heard [I] : a grade 1 [2+] : left 2+ [1+] : left 1+ [No Abnormalities] : the abdominal aorta was not enlarged and no bruit was heard [No Pitting Edema] : no pitting edema present [FreeTextEntry1] : mild reddening of left foot with small blisters at tips of 2 toes [Apical Thrill] : no thrill palpable at the apex [Click] : no click [Pericardial Rub] : no pericardial rub [Right Carotid Bruit] : no bruit heard over the right carotid [Left Carotid Bruit] : no bruit heard over the left carotid

## 2022-12-20 ENCOUNTER — APPOINTMENT (OUTPATIENT)
Dept: CARDIOLOGY | Facility: CLINIC | Age: 62
End: 2022-12-20

## 2022-12-20 VITALS
SYSTOLIC BLOOD PRESSURE: 174 MMHG | HEART RATE: 61 BPM | OXYGEN SATURATION: 97 % | RESPIRATION RATE: 15 BRPM | DIASTOLIC BLOOD PRESSURE: 96 MMHG | HEIGHT: 61 IN

## 2022-12-20 PROCEDURE — 99211 OFF/OP EST MAY X REQ PHY/QHP: CPT

## 2023-01-03 ENCOUNTER — APPOINTMENT (OUTPATIENT)
Dept: CARDIOLOGY | Facility: CLINIC | Age: 63
End: 2023-01-03
Payer: MEDICARE

## 2023-01-03 VITALS — DIASTOLIC BLOOD PRESSURE: 80 MMHG | OXYGEN SATURATION: 98 % | SYSTOLIC BLOOD PRESSURE: 145 MMHG | HEART RATE: 97 BPM

## 2023-01-03 PROCEDURE — 99211 OFF/OP EST MAY X REQ PHY/QHP: CPT

## 2023-02-06 ENCOUNTER — NON-APPOINTMENT (OUTPATIENT)
Age: 63
End: 2023-02-06

## 2023-03-13 ENCOUNTER — NON-APPOINTMENT (OUTPATIENT)
Age: 63
End: 2023-03-13

## 2023-03-14 ENCOUNTER — INPATIENT (INPATIENT)
Facility: HOSPITAL | Age: 63
LOS: 9 days | Discharge: NURSING, REHAB OR LONG-TERM CARE | DRG: 193 | End: 2023-03-24
Attending: INTERNAL MEDICINE | Admitting: INTERNAL MEDICINE
Payer: MEDICARE

## 2023-03-14 VITALS
DIASTOLIC BLOOD PRESSURE: 89 MMHG | SYSTOLIC BLOOD PRESSURE: 132 MMHG | WEIGHT: 151.9 LBS | HEART RATE: 136 BPM | OXYGEN SATURATION: 92 % | TEMPERATURE: 100 F | RESPIRATION RATE: 26 BRPM

## 2023-03-14 DIAGNOSIS — Z98.89 OTHER SPECIFIED POSTPROCEDURAL STATES: Chronic | ICD-10-CM

## 2023-03-14 DIAGNOSIS — R06.02 SHORTNESS OF BREATH: ICD-10-CM

## 2023-03-14 DIAGNOSIS — Z90.49 ACQUIRED ABSENCE OF OTHER SPECIFIED PARTS OF DIGESTIVE TRACT: Chronic | ICD-10-CM

## 2023-03-14 LAB
ALBUMIN SERPL ELPH-MCNC: 3.7 G/DL — SIGNIFICANT CHANGE UP (ref 3.3–5)
ALP SERPL-CCNC: 223 U/L — HIGH (ref 40–120)
ALT FLD-CCNC: 96 U/L — HIGH (ref 12–78)
ANION GAP SERPL CALC-SCNC: 6 MMOL/L — SIGNIFICANT CHANGE UP (ref 5–17)
APTT BLD: 31.9 SEC — SIGNIFICANT CHANGE UP (ref 27.5–35.5)
AST SERPL-CCNC: 51 U/L — HIGH (ref 15–37)
BASOPHILS # BLD AUTO: 0.06 K/UL — SIGNIFICANT CHANGE UP (ref 0–0.2)
BASOPHILS NFR BLD AUTO: 0.5 % — SIGNIFICANT CHANGE UP (ref 0–2)
BILIRUB SERPL-MCNC: 1 MG/DL — SIGNIFICANT CHANGE UP (ref 0.2–1.2)
BUN SERPL-MCNC: 28 MG/DL — HIGH (ref 7–23)
CALCIUM SERPL-MCNC: 9.4 MG/DL — SIGNIFICANT CHANGE UP (ref 8.5–10.1)
CHLORIDE SERPL-SCNC: 104 MMOL/L — SIGNIFICANT CHANGE UP (ref 96–108)
CO2 SERPL-SCNC: 28 MMOL/L — SIGNIFICANT CHANGE UP (ref 22–31)
CREAT SERPL-MCNC: 0.84 MG/DL — SIGNIFICANT CHANGE UP (ref 0.5–1.3)
EGFR: 80 ML/MIN/1.73M2 — SIGNIFICANT CHANGE UP
EOSINOPHIL # BLD AUTO: 0.45 K/UL — SIGNIFICANT CHANGE UP (ref 0–0.5)
EOSINOPHIL NFR BLD AUTO: 3.6 % — SIGNIFICANT CHANGE UP (ref 0–6)
GLUCOSE SERPL-MCNC: 143 MG/DL — HIGH (ref 70–99)
HCT VFR BLD CALC: 38.7 % — SIGNIFICANT CHANGE UP (ref 34.5–45)
HGB BLD-MCNC: 12.6 G/DL — SIGNIFICANT CHANGE UP (ref 11.5–15.5)
IMM GRANULOCYTES NFR BLD AUTO: 0.4 % — SIGNIFICANT CHANGE UP (ref 0–0.9)
INR BLD: 1.07 RATIO — SIGNIFICANT CHANGE UP (ref 0.88–1.16)
LACTATE SERPL-SCNC: 1.4 MMOL/L — SIGNIFICANT CHANGE UP (ref 0.7–2)
LACTATE SERPL-SCNC: 2.1 MMOL/L — HIGH (ref 0.7–2)
LYMPHOCYTES # BLD AUTO: 1.49 K/UL — SIGNIFICANT CHANGE UP (ref 1–3.3)
LYMPHOCYTES # BLD AUTO: 11.8 % — LOW (ref 13–44)
MCHC RBC-ENTMCNC: 30.6 PG — SIGNIFICANT CHANGE UP (ref 27–34)
MCHC RBC-ENTMCNC: 32.6 GM/DL — SIGNIFICANT CHANGE UP (ref 32–36)
MCV RBC AUTO: 93.9 FL — SIGNIFICANT CHANGE UP (ref 80–100)
MONOCYTES # BLD AUTO: 0.82 K/UL — SIGNIFICANT CHANGE UP (ref 0–0.9)
MONOCYTES NFR BLD AUTO: 6.5 % — SIGNIFICANT CHANGE UP (ref 2–14)
NEUTROPHILS # BLD AUTO: 9.75 K/UL — HIGH (ref 1.8–7.4)
NEUTROPHILS NFR BLD AUTO: 77.2 % — HIGH (ref 43–77)
NRBC # BLD: 0 /100 WBCS — SIGNIFICANT CHANGE UP (ref 0–0)
PLATELET # BLD AUTO: 238 K/UL — SIGNIFICANT CHANGE UP (ref 150–400)
POTASSIUM SERPL-MCNC: 3.8 MMOL/L — SIGNIFICANT CHANGE UP (ref 3.5–5.3)
POTASSIUM SERPL-SCNC: 3.8 MMOL/L — SIGNIFICANT CHANGE UP (ref 3.5–5.3)
PROT SERPL-MCNC: 7.7 G/DL — SIGNIFICANT CHANGE UP (ref 6–8.3)
PROTHROM AB SERPL-ACNC: 12.5 SEC — SIGNIFICANT CHANGE UP (ref 10.5–13.4)
RAPID RVP RESULT: DETECTED
RBC # BLD: 4.12 M/UL — SIGNIFICANT CHANGE UP (ref 3.8–5.2)
RBC # FLD: 13 % — SIGNIFICANT CHANGE UP (ref 10.3–14.5)
RV+EV RNA SPEC QL NAA+PROBE: DETECTED
SARS-COV-2 RNA SPEC QL NAA+PROBE: SIGNIFICANT CHANGE UP
SODIUM SERPL-SCNC: 138 MMOL/L — SIGNIFICANT CHANGE UP (ref 135–145)
WBC # BLD: 12.62 K/UL — HIGH (ref 3.8–10.5)
WBC # FLD AUTO: 12.62 K/UL — HIGH (ref 3.8–10.5)

## 2023-03-14 PROCEDURE — 93010 ELECTROCARDIOGRAM REPORT: CPT

## 2023-03-14 PROCEDURE — 71045 X-RAY EXAM CHEST 1 VIEW: CPT | Mod: 26

## 2023-03-14 PROCEDURE — 99285 EMERGENCY DEPT VISIT HI MDM: CPT | Mod: FS,CS

## 2023-03-14 RX ORDER — SODIUM CHLORIDE 9 MG/ML
1000 INJECTION INTRAMUSCULAR; INTRAVENOUS; SUBCUTANEOUS ONCE
Refills: 0 | Status: COMPLETED | OUTPATIENT
Start: 2023-03-14 | End: 2023-03-14

## 2023-03-14 RX ORDER — LEVOTHYROXINE SODIUM 125 MCG
225 TABLET ORAL DAILY
Refills: 0 | Status: DISCONTINUED | OUTPATIENT
Start: 2023-03-15 | End: 2023-03-24

## 2023-03-14 RX ORDER — INSULIN LISPRO 100/ML
VIAL (ML) SUBCUTANEOUS
Refills: 0 | Status: DISCONTINUED | OUTPATIENT
Start: 2023-03-14 | End: 2023-03-24

## 2023-03-14 RX ORDER — DEXTROSE 50 % IN WATER 50 %
12.5 SYRINGE (ML) INTRAVENOUS ONCE
Refills: 0 | Status: DISCONTINUED | OUTPATIENT
Start: 2023-03-14 | End: 2023-03-24

## 2023-03-14 RX ORDER — ALBUTEROL 90 UG/1
2.5 AEROSOL, METERED ORAL EVERY 6 HOURS
Refills: 0 | Status: DISCONTINUED | OUTPATIENT
Start: 2023-03-14 | End: 2023-03-24

## 2023-03-14 RX ORDER — ACETAMINOPHEN 500 MG
650 TABLET ORAL ONCE
Refills: 0 | Status: DISCONTINUED | OUTPATIENT
Start: 2023-03-14 | End: 2023-03-14

## 2023-03-14 RX ORDER — HYDROCORTISONE 20 MG
50 TABLET ORAL EVERY 8 HOURS
Refills: 0 | Status: DISCONTINUED | OUTPATIENT
Start: 2023-03-14 | End: 2023-03-24

## 2023-03-14 RX ORDER — SODIUM CHLORIDE 9 MG/ML
1000 INJECTION, SOLUTION INTRAVENOUS
Refills: 0 | Status: DISCONTINUED | OUTPATIENT
Start: 2023-03-14 | End: 2023-03-24

## 2023-03-14 RX ORDER — SUCRALFATE 1 G
1 TABLET ORAL
Refills: 0 | Status: DISCONTINUED | OUTPATIENT
Start: 2023-03-14 | End: 2023-03-24

## 2023-03-14 RX ORDER — ONDANSETRON 8 MG/1
4 TABLET, FILM COATED ORAL ONCE
Refills: 0 | Status: COMPLETED | OUTPATIENT
Start: 2023-03-14 | End: 2023-03-14

## 2023-03-14 RX ORDER — ACETAMINOPHEN 500 MG
650 TABLET ORAL EVERY 6 HOURS
Refills: 0 | Status: DISCONTINUED | OUTPATIENT
Start: 2023-03-14 | End: 2023-03-24

## 2023-03-14 RX ORDER — LINACLOTIDE 145 UG/1
0 CAPSULE, GELATIN COATED ORAL
Qty: 0 | Refills: 0 | DISCHARGE

## 2023-03-14 RX ORDER — IPRATROPIUM/ALBUTEROL SULFATE 18-103MCG
3 AEROSOL WITH ADAPTER (GRAM) INHALATION ONCE
Refills: 0 | Status: COMPLETED | OUTPATIENT
Start: 2023-03-14 | End: 2023-03-14

## 2023-03-14 RX ORDER — ACETAMINOPHEN 500 MG
1000 TABLET ORAL ONCE
Refills: 0 | Status: COMPLETED | OUTPATIENT
Start: 2023-03-14 | End: 2023-03-14

## 2023-03-14 RX ORDER — INSULIN LISPRO 100/ML
VIAL (ML) SUBCUTANEOUS AT BEDTIME
Refills: 0 | Status: DISCONTINUED | OUTPATIENT
Start: 2023-03-15 | End: 2023-03-24

## 2023-03-14 RX ORDER — DEXTROSE 50 % IN WATER 50 %
25 SYRINGE (ML) INTRAVENOUS ONCE
Refills: 0 | Status: DISCONTINUED | OUTPATIENT
Start: 2023-03-14 | End: 2023-03-24

## 2023-03-14 RX ORDER — KETOROLAC TROMETHAMINE 30 MG/ML
30 SYRINGE (ML) INJECTION ONCE
Refills: 0 | Status: DISCONTINUED | OUTPATIENT
Start: 2023-03-14 | End: 2023-03-14

## 2023-03-14 RX ORDER — DEXTROSE 50 % IN WATER 50 %
15 SYRINGE (ML) INTRAVENOUS ONCE
Refills: 0 | Status: DISCONTINUED | OUTPATIENT
Start: 2023-03-14 | End: 2023-03-24

## 2023-03-14 RX ORDER — CELECOXIB 200 MG/1
200 CAPSULE ORAL DAILY
Refills: 0 | Status: DISCONTINUED | OUTPATIENT
Start: 2023-03-14 | End: 2023-03-24

## 2023-03-14 RX ORDER — ENOXAPARIN SODIUM 100 MG/ML
40 INJECTION SUBCUTANEOUS EVERY 24 HOURS
Refills: 0 | Status: DISCONTINUED | OUTPATIENT
Start: 2023-03-14 | End: 2023-03-24

## 2023-03-14 RX ORDER — PANTOPRAZOLE SODIUM 20 MG/1
40 TABLET, DELAYED RELEASE ORAL
Refills: 0 | Status: DISCONTINUED | OUTPATIENT
Start: 2023-03-14 | End: 2023-03-24

## 2023-03-14 RX ORDER — MODAFINIL 200 MG/1
400 TABLET ORAL DAILY
Refills: 0 | Status: DISCONTINUED | OUTPATIENT
Start: 2023-03-15 | End: 2023-03-21

## 2023-03-14 RX ORDER — GLUCAGON INJECTION, SOLUTION 0.5 MG/.1ML
1 INJECTION, SOLUTION SUBCUTANEOUS ONCE
Refills: 0 | Status: DISCONTINUED | OUTPATIENT
Start: 2023-03-14 | End: 2023-03-24

## 2023-03-14 RX ORDER — MEMANTINE HYDROCHLORIDE 10 MG/1
10 TABLET ORAL
Refills: 0 | Status: DISCONTINUED | OUTPATIENT
Start: 2023-03-14 | End: 2023-03-24

## 2023-03-14 RX ORDER — CLONAZEPAM 1 MG
0.5 TABLET ORAL DAILY
Refills: 0 | Status: DISCONTINUED | OUTPATIENT
Start: 2023-03-14 | End: 2023-03-21

## 2023-03-14 RX ORDER — METFORMIN HYDROCHLORIDE 850 MG/1
500 TABLET ORAL AT BEDTIME
Refills: 0 | Status: DISCONTINUED | OUTPATIENT
Start: 2023-03-14 | End: 2023-03-24

## 2023-03-14 RX ORDER — ONDANSETRON 8 MG/1
4 TABLET, FILM COATED ORAL EVERY 6 HOURS
Refills: 0 | Status: DISCONTINUED | OUTPATIENT
Start: 2023-03-14 | End: 2023-03-24

## 2023-03-14 RX ORDER — HYDROMORPHONE HYDROCHLORIDE 2 MG/ML
4 INJECTION INTRAMUSCULAR; INTRAVENOUS; SUBCUTANEOUS DAILY
Refills: 0 | Status: DISCONTINUED | OUTPATIENT
Start: 2023-03-14 | End: 2023-03-15

## 2023-03-14 RX ORDER — ATORVASTATIN CALCIUM 80 MG/1
40 TABLET, FILM COATED ORAL AT BEDTIME
Refills: 0 | Status: DISCONTINUED | OUTPATIENT
Start: 2023-03-14 | End: 2023-03-24

## 2023-03-14 RX ORDER — LANOLIN ALCOHOL/MO/W.PET/CERES
3 CREAM (GRAM) TOPICAL AT BEDTIME
Refills: 0 | Status: DISCONTINUED | OUTPATIENT
Start: 2023-03-14 | End: 2023-03-24

## 2023-03-14 RX ADMIN — Medication 3 MILLILITER(S): at 20:40

## 2023-03-14 RX ADMIN — Medication 400 MILLIGRAM(S): at 18:50

## 2023-03-14 RX ADMIN — Medication 30 MILLIGRAM(S): at 18:41

## 2023-03-14 RX ADMIN — SODIUM CHLORIDE 1000 MILLILITER(S): 9 INJECTION INTRAMUSCULAR; INTRAVENOUS; SUBCUTANEOUS at 18:37

## 2023-03-14 RX ADMIN — Medication 3 MILLILITER(S): at 18:42

## 2023-03-14 RX ADMIN — Medication 125 MILLIGRAM(S): at 20:39

## 2023-03-14 RX ADMIN — SODIUM CHLORIDE 1000 MILLILITER(S): 9 INJECTION INTRAMUSCULAR; INTRAVENOUS; SUBCUTANEOUS at 18:42

## 2023-03-14 RX ADMIN — ONDANSETRON 4 MILLIGRAM(S): 8 TABLET, FILM COATED ORAL at 18:50

## 2023-03-14 NOTE — ED ADULT NURSE NOTE - HAVE YOU RECEIVED AT LEAST TWO PFIZER AND/OR MODERNA VACCINATIONS (IN ANY COMBINATION) AND/OR ONE JOHNSON & JOHNSON VACCINATION?
AMBULATORY CASE MANAGEMENT NOTE    Name and Relationship of Patient/Support Person: Mami Srivastava M - Self    Adult Patient Profile  Questions/Answers    Flowsheet Row Most Recent Value   Symptoms/Conditions Managed at Home musculoskeletal   Musculoskeletal Management Strategies other (see comments)  [Ice to swollen face as needed. ]   Musculoskeletal Self-Management Outcome 4 (good)   Musculoskeletal Comment Patient is noticing brusing and swelling at head injury. ACM recommends ice to swelling as needed. Report numbness to skull to PCP if it continues when swelling disapates.       Patient Outreach    Introduced self, explained ACM RN role and provided contact information. Spoke with patient regarding needs and concerns after ED visit and head injury. Patient states wound is brusing and swelling. ACM recommended ice to swelling as needed to help. Patient verbalized understanding. Patient taking Advil as needed for shoulder pain. ACM reviewed with patient. No other needs at this time. Follow up review scheduled in 1 month. Social Determinants survey sent to patient via [employer].     Education Documentation  No documentation found.        VICKEY CARDOZA  Ambulatory Case Management    5/17/2022, 14:23 EDT   Yes

## 2023-03-14 NOTE — ED PROVIDER NOTE - CONSTITUTIONAL, MLM
normal... Well appearing, awake, alert, oriented to person, place, time/situation and in no apparent distress. o2 sat 91 % room air placed on 3 L NC

## 2023-03-14 NOTE — ED PROVIDER NOTE - PROGRESS NOTE DETAILS
pt given neb tx x 3 steroids pna left lower lobe rhinovirus positive hypoxia drops to 88 to 90 will admit

## 2023-03-14 NOTE — ED PROVIDER NOTE - CLINICAL SUMMARY MEDICAL DECISION MAKING FREE TEXT BOX
sore throat, cough, fever, congestion x 3 days. tachycardia, hypoxia. labs, CXR, nebs. concern for bronchitis, pneumonia, dehydration.

## 2023-03-14 NOTE — ED ADULT NURSE NOTE - NSIMPLEMENTINTERV_GEN_ALL_ED
Implemented All Universal Safety Interventions:  [place] to call system. Call bell, personal items and telephone within reach. Instruct patient to call for assistance. Room bathroom lighting operational. Non-slip footwear when patient is off stretcher. Physically safe environment: no spills, clutter or unnecessary equipment. Stretcher in lowest position, wheels locked, appropriate side rails in place.

## 2023-03-14 NOTE — ED PROVIDER NOTE - ATTENDING CONTRIBUTION TO CARE
Patient came into the ED with her  c/o not feeling well x 3 days. +bodyaches, sore throat, cough/congestion, fever today up to 101. seen at urgent care and sent to the ED for tachycardia and hypoxia. no sick contacts. no travel.    A&Ox3, NAD. PERRLx2. Lungs b/l decreased air entry with coarse breath sounds, rhonchi. SPO2 91-92% on RA. S1S2, no murmurs, RRR. Abdomen soft, nt/nd. +PMS x4. mild pedal edema.

## 2023-03-14 NOTE — ED PROVIDER NOTE - OBJECTIVE STATEMENT
Patient is a 62-year-old female with past medical history of YULIA cervical radiculopathy Cushing syndrome diabetes fibromyalgia gastroparesis GERD hypothyroidism on kidney stones migraines mitral valve prolapse neurogenic bladder osteoarthritis Raynaud's coming in complaining of fever for the last few days.  Patient states cough and sore throat started yesterday.  Patient denies any abdominal pain nausea vomiting diarrhea chest pain or shortness of breath.  Patient went to urgent care flu and COVID done advised to come to emergency room due to low oxygen and rapid heart rate.  Test result pending.  Patient denies any recent travels leg swelling or sick contacts.  Patient vaccinated for COVID and flu.

## 2023-03-14 NOTE — ED PROVIDER NOTE - CARE PLAN
1 Principal Discharge DX:	Shortness of breath  Secondary Diagnosis:	Pneumonia  Secondary Diagnosis:	Hypoxia

## 2023-03-14 NOTE — ED PROVIDER NOTE - NS ED ATTENDING STATEMENT MOD
I have seen and examined this patient and fully participated in the care of this patient as the teaching attending.  The service was shared with the K[facility].  I reviewed and verified the documentation and independently performed the documented:

## 2023-03-14 NOTE — PHARMACOTHERAPY INTERVENTION NOTE - COMMENTS
Patient Name: Flora Huddleston Date: 1960  Address: [address] [place] [id number]: Female  Rx Written	Rx Dispensed	Drug	Quantity	Days Supply	Prescriber Name	Prescriber Ashlie #	Payment Method	Dispenser  02/27/2023	03/13/2023	pregabalin 100 mg capsule	90	30	Moody, Tram PA	[id number]	Insurance	[id number]  02/27/2023	03/02/2023	fentanyl 25 mcg/hr patch	15	30	Moody, Tram PA	[id number]	Insurance	[id number]  02/27/2023	03/02/2023	hydromorphone 4 mg tablet	30	30	Moody, Tram PA	[id number]	Insurance	[id number]  01/26/2023	02/08/2023	pregabalin 100 mg capsule	90	30	Moody, Tram PA	[id number]	Insurance	[id number]  01/26/2023	02/01/2023	hydromorphone 4 mg tablet	30	30	Moody, Tram PA	[id number]	Insurance	[id number]  01/26/2023	02/01/2023	modafinil 200 mg tablet	60	30	Moody, Tram PA	[id number]	Insurance	[id number]  01/26/2023	02/01/2023	fentanyl 25 mcg/hr patch	15	30	Moody, Tram PA	[id number]	Insurance	[id number]  01/12/2023	01/17/2023	clonazepam 0.5 mg tablet	30	30	Moody, Tram PA	[id number]	Medicare	[id number]  01/06/2023	01/10/2023	fentanyl 25 mcg/hr patch	5	10	Hadi, Abdussami	[id number]	Medicare	[id number]  01/06/2023	01/10/2023	hydromorphone 4 mg tablet	10	10	Hadi, Abdussami	[id number]	Medicare	[id number]  01/06/2023	01/10/2023	pregabalin 100 mg capsule	90	30	Hadi, Abdussami	[id number]	Medicare	[id number]  11/22/2022	12/04/2022	fentanyl 25 mcg/hr patch	15	30	Moody, Tram PA	[id number]	Medicare	[id number]  11/30/2022	12/04/2022	hydromorphone 4 mg tablet	30	30	Moody, Tram PA	[id number]	Medicare	[id number]  11/22/2022	11/23/2022	hydromorphone 4 mg tablet	7	7	Moody, Tram PA	[id number]	Medicare	[id number]  11/22/2022	11/23/2022	pregabalin 100 mg capsule	90	30	Moody, Tram PA	[id number]	Medicare	[id number]  11/22/2022	11/23/2022	clonazepam 0.5 mg tablet	30	30	Moody, Tram PA	[id number]	Medicare	[id number]  10/10/2022	10/12/2022	fentanyl 25 mcg/hr patch	15	30	Moody, Tram PA	[id number]	Insurance	[id number]  10/10/2022	10/12/2022	modafinil 200 mg tablet	60	30	Moody, Tram PA	[id number]	Insurance	[facility]  10/10/2022	10/12/2022	hydromorphone 4 mg tablet	30	30	Moody, Tram PA	[id number]	Insurance	[facility]  10/10/2022	10/12/2022	pregabalin 100 mg capsule	90	30	Moody, Tram PA	[id number]	Insurance	[facility]  10/10/2022	10/12/2022	clonazepam 0.5 mg tablet	30	30	Moody, Tram PA	[id number]	Insurance	[facility]  09/12/2022	09/15/2022	fentanyl 25 mcg/hr patch	15	30	Moody, Tram PA	[id number]	Insurance	[facility]  09/12/2022	09/15/2022	hydromorphone 4 mg tablet	30	30	Moody, Tram PA	[id number]	Insurance	[facility]  09/12/2022	09/15/2022	pregabalin 100 mg capsule	90	30	Moody, Tram PA	[id number]	Insurance	[facility]  09/12/2022	09/15/2022	clonazepam 0.5 mg tablet	30	30	Moody, Tram PA	[id number]	Insurance	[facility]  09/12/2022	09/15/2022	modafinil 200 mg tablet	60	30	Moody, Tram PA	[id number]	Insurance	[facility]  08/15/2022	08/17/2022	modafinil 200 mg tablet	60	30	Moody, Tram PA	[id number]	Insurance	[facility]  08/15/2022	08/17/2022	fentanyl 25 mcg/hr patch	15	30	Moody, Tram PA	[id number]	Insurance	[facility]  08/15/2022	08/17/2022	hydromorphone 4 mg tablet	30	30	Moody, Tram PA	[id number]	Insurance	[facility]  08/15/2022	08/17/2022	clonazepam 0.5 mg tablet	30	30	Moody, Tram PA	[id number]	Insurance	[facility]  08/15/2022	08/17/2022	pregabalin 100 mg capsule	90	30	Moody, Tram PA	[id number]	Insurance	[facility] 07/27/2022	07/29/2022	fentanyl 25 mcg/hr patch	10	20	Moody, Tram PA	[id number]	Insurance	[facility] 07/27/2022	07/29/2022	pregabalin 100 mg capsule	45	15	Moody, Tram PA	[id number]	Insurance	[facility] 07/27/2022	07/29/2022	hydromorphone 4 mg tablet	15	15	Moody, Tram PA	[id number]	Insurance	[facility] 07/27/2022	07/29/2022	modafinil 200 mg tablet	30	15	Moody, Tram PA	[id number]	Insurance	[facility] 07/27/2022	07/29/2022	clonazepam 0.5 mg tablet	15	15	Moody, Tram PA	[id number]	Insurance	[facility] 06/06/2022	06/17/2022	modafinil 200 mg tablet	60	30	Moody, Tram PA	[id number]	Insurance	[facility] 06/06/2022	06/17/2022	fentanyl 25 mcg/hr patch	15	30	Moody, Tram PA	[id number]	Insurance	[facility] 06/06/2022	06/17/2022	hydromorphone 4 mg tablet	30	30	Moody, Tram PA	[id number]	Insurance	[facility] 06/06/2022	06/08/2022	clonazepam 0.5 mg tablet	30	30	Moody, Tram PA	[id number]	Insurance	[facility] 06/06/2022	06/08/2022	pregabalin 100 mg capsule	90	30	Moody, Tram PA	[id number]	Insurance	[facility] 05/09/2022	05/20/2022	modafinil 200 mg tablet	60	30	Moody, Tram PA	[id number]	Insurance	[facility] 05/09/2022	05/20/2022	fentanyl 25 mcg/hr patch	15	30	Moody, Tram PA	[id number]	Insurance	[facility] 05/09/2022	05/20/2022	hydromorphone 4 mg tablet	30	30	Moody, Tram PA	[id number]	Insurance	[facility]  05/09/2022	05/10/2022	clonazepam 0.5 mg tablet	30	30	Moody, Tram PA	[id number]	Insurance	[facility]  05/09/2022	05/10/2022	pregabalin 100 mg capsule	90	30	Moody, Tram PA	[id number]	Insurance	[facility] 04/06/2022	04/22/2022	modafinil 200 mg tablet	60	30	Moody, Tram PA	[id number]	Insurance	[facility] 04/06/2022	04/22/2022	hydromorphone 4 mg tablet	30	30	Moody, Tram PA	[id number]	Insurance	[facility] 04/06/2022	04/18/2022	fentanyl 25 mcg/hr patch	15	30	Moody, Tram PA	[id number]	Insurance	[facility] 04/06/2022	04/07/2022	pregabalin 100 mg capsule	90	30	Moody, Tram PA	[id number]	Insurance	[facility] 04/06/2022	04/07/2022	clonazepam 0.5 mg tablet	30	30	Moody, Tram PA	[id number]	Insurance	[facility] 03/07/2022	03/21/2022	fentanyl 25 mcg/hr patch	15	30	Moody, Tram PA	[id number]	Insurance	[facility]

## 2023-03-15 DIAGNOSIS — I10 ESSENTIAL (PRIMARY) HYPERTENSION: ICD-10-CM

## 2023-03-15 DIAGNOSIS — J96.01 ACUTE RESPIRATORY FAILURE WITH HYPOXIA: ICD-10-CM

## 2023-03-15 DIAGNOSIS — E11.9 TYPE 2 DIABETES MELLITUS WITHOUT COMPLICATIONS: ICD-10-CM

## 2023-03-15 DIAGNOSIS — E78.5 HYPERLIPIDEMIA, UNSPECIFIED: ICD-10-CM

## 2023-03-15 DIAGNOSIS — M79.7 FIBROMYALGIA: ICD-10-CM

## 2023-03-15 LAB
A1C WITH ESTIMATED AVERAGE GLUCOSE RESULT: 5.6 % — SIGNIFICANT CHANGE UP (ref 4–5.6)
ALBUMIN SERPL ELPH-MCNC: 3 G/DL — LOW (ref 3.3–5)
ALP SERPL-CCNC: 210 U/L — HIGH (ref 40–120)
ALT FLD-CCNC: 86 U/L — HIGH (ref 12–78)
ANION GAP SERPL CALC-SCNC: 4 MMOL/L — LOW (ref 5–17)
APPEARANCE UR: CLEAR — SIGNIFICANT CHANGE UP
AST SERPL-CCNC: 41 U/L — HIGH (ref 15–37)
BILIRUB DIRECT SERPL-MCNC: 0.2 MG/DL — SIGNIFICANT CHANGE UP (ref 0–0.3)
BILIRUB INDIRECT FLD-MCNC: 0.4 MG/DL — SIGNIFICANT CHANGE UP (ref 0.2–1)
BILIRUB SERPL-MCNC: 0.6 MG/DL — SIGNIFICANT CHANGE UP (ref 0.2–1.2)
BILIRUB UR-MCNC: NEGATIVE — SIGNIFICANT CHANGE UP
BUN SERPL-MCNC: 23 MG/DL — SIGNIFICANT CHANGE UP (ref 7–23)
CALCIUM SERPL-MCNC: 8.7 MG/DL — SIGNIFICANT CHANGE UP (ref 8.5–10.1)
CHLORIDE SERPL-SCNC: 110 MMOL/L — HIGH (ref 96–108)
CO2 SERPL-SCNC: 27 MMOL/L — SIGNIFICANT CHANGE UP (ref 22–31)
COLOR SPEC: YELLOW — SIGNIFICANT CHANGE UP
CREAT SERPL-MCNC: 0.7 MG/DL — SIGNIFICANT CHANGE UP (ref 0.5–1.3)
DIFF PNL FLD: NEGATIVE — SIGNIFICANT CHANGE UP
EGFR: 99 ML/MIN/1.73M2 — SIGNIFICANT CHANGE UP
ESTIMATED AVERAGE GLUCOSE: 114 MG/DL — SIGNIFICANT CHANGE UP (ref 68–114)
GLUCOSE SERPL-MCNC: 165 MG/DL — HIGH (ref 70–99)
GLUCOSE UR QL: 50 MG/DL
HCT VFR BLD CALC: 33 % — LOW (ref 34.5–45)
HGB BLD-MCNC: 10.5 G/DL — LOW (ref 11.5–15.5)
KETONES UR-MCNC: ABNORMAL
LEUKOCYTE ESTERASE UR-ACNC: NEGATIVE — SIGNIFICANT CHANGE UP
MAGNESIUM SERPL-MCNC: 2 MG/DL — SIGNIFICANT CHANGE UP (ref 1.6–2.6)
MCHC RBC-ENTMCNC: 29.9 PG — SIGNIFICANT CHANGE UP (ref 27–34)
MCHC RBC-ENTMCNC: 31.8 GM/DL — LOW (ref 32–36)
MCV RBC AUTO: 94 FL — SIGNIFICANT CHANGE UP (ref 80–100)
NITRITE UR-MCNC: NEGATIVE — SIGNIFICANT CHANGE UP
NRBC # BLD: 0 /100 WBCS — SIGNIFICANT CHANGE UP (ref 0–0)
PH UR: 5 — SIGNIFICANT CHANGE UP (ref 5–8)
PLATELET # BLD AUTO: 203 K/UL — SIGNIFICANT CHANGE UP (ref 150–400)
POTASSIUM SERPL-MCNC: 4.1 MMOL/L — SIGNIFICANT CHANGE UP (ref 3.5–5.3)
POTASSIUM SERPL-SCNC: 4.1 MMOL/L — SIGNIFICANT CHANGE UP (ref 3.5–5.3)
PROCALCITONIN SERPL-MCNC: 0.28 NG/ML — HIGH
PROT SERPL-MCNC: 6.6 G/DL — SIGNIFICANT CHANGE UP (ref 6–8.3)
PROT UR-MCNC: 15
RBC # BLD: 3.51 M/UL — LOW (ref 3.8–5.2)
RBC # FLD: 12.8 % — SIGNIFICANT CHANGE UP (ref 10.3–14.5)
S PYO DNA THROAT QL NAA+PROBE: SIGNIFICANT CHANGE UP
SODIUM SERPL-SCNC: 141 MMOL/L — SIGNIFICANT CHANGE UP (ref 135–145)
SP GR SPEC: 1.01 — SIGNIFICANT CHANGE UP (ref 1.01–1.02)
UROBILINOGEN FLD QL: NEGATIVE — SIGNIFICANT CHANGE UP
WBC # BLD: 8.28 K/UL — SIGNIFICANT CHANGE UP (ref 3.8–10.5)
WBC # FLD AUTO: 8.28 K/UL — SIGNIFICANT CHANGE UP (ref 3.8–10.5)

## 2023-03-15 PROCEDURE — 71250 CT THORAX DX C-: CPT | Mod: 26

## 2023-03-15 RX ORDER — HYDROMORPHONE HYDROCHLORIDE 2 MG/ML
1 INJECTION INTRAMUSCULAR; INTRAVENOUS; SUBCUTANEOUS
Qty: 0 | Refills: 0 | DISCHARGE

## 2023-03-15 RX ORDER — DEXLANSOPRAZOLE 30 MG/1
1 CAPSULE, DELAYED RELEASE ORAL
Qty: 0 | Refills: 0 | DISCHARGE

## 2023-03-15 RX ORDER — PRUCALOPRIDE 2 MG/1
1 TABLET, FILM COATED ORAL
Qty: 0 | Refills: 0 | DISCHARGE

## 2023-03-15 RX ORDER — MODAFINIL 200 MG/1
2 TABLET ORAL
Qty: 0 | Refills: 0 | DISCHARGE

## 2023-03-15 RX ORDER — LABETALOL HCL 100 MG
2 TABLET ORAL
Qty: 0 | Refills: 0 | DISCHARGE

## 2023-03-15 RX ORDER — HYDROMORPHONE HYDROCHLORIDE 2 MG/ML
4 INJECTION INTRAMUSCULAR; INTRAVENOUS; SUBCUTANEOUS DAILY
Refills: 0 | Status: DISCONTINUED | OUTPATIENT
Start: 2023-03-15 | End: 2023-03-22

## 2023-03-15 RX ORDER — METFORMIN HYDROCHLORIDE 850 MG/1
1 TABLET ORAL
Qty: 0 | Refills: 0 | DISCHARGE

## 2023-03-15 RX ORDER — ZOLMITRIPTAN 5 MG/1
0 TABLET ORAL
Qty: 0 | Refills: 0 | DISCHARGE

## 2023-03-15 RX ORDER — HYDROCORTISONE 20 MG
1 TABLET ORAL
Qty: 0 | Refills: 0 | DISCHARGE

## 2023-03-15 RX ORDER — LANSOPRAZOLE 15 MG/1
1 CAPSULE, DELAYED RELEASE ORAL
Qty: 0 | Refills: 0 | DISCHARGE

## 2023-03-15 RX ORDER — ROSUVASTATIN CALCIUM 5 MG/1
1 TABLET ORAL
Qty: 0 | Refills: 0 | DISCHARGE

## 2023-03-15 RX ORDER — OLMESARTAN MEDOXOMIL 5 MG/1
1 TABLET, FILM COATED ORAL
Qty: 0 | Refills: 0 | DISCHARGE

## 2023-03-15 RX ORDER — AMLODIPINE BESYLATE 2.5 MG/1
1 TABLET ORAL
Qty: 0 | Refills: 0 | DISCHARGE

## 2023-03-15 RX ORDER — FENTANYL CITRATE 50 UG/ML
1 INJECTION INTRAVENOUS
Qty: 0 | Refills: 0 | DISCHARGE

## 2023-03-15 RX ORDER — CLONAZEPAM 1 MG
1 TABLET ORAL
Qty: 0 | Refills: 0 | DISCHARGE

## 2023-03-15 RX ORDER — LINACLOTIDE 145 UG/1
1 CAPSULE, GELATIN COATED ORAL
Qty: 0 | Refills: 0 | DISCHARGE

## 2023-03-15 RX ORDER — SUCRALFATE 1 G
1 TABLET ORAL
Qty: 0 | Refills: 0 | DISCHARGE

## 2023-03-15 RX ORDER — CELECOXIB 200 MG/1
1 CAPSULE ORAL
Qty: 0 | Refills: 0 | DISCHARGE

## 2023-03-15 RX ORDER — ONDANSETRON 8 MG/1
1 TABLET, FILM COATED ORAL
Qty: 0 | Refills: 0 | DISCHARGE

## 2023-03-15 RX ORDER — DENOSUMAB 60 MG/ML
0 INJECTION SUBCUTANEOUS
Qty: 0 | Refills: 0 | DISCHARGE

## 2023-03-15 RX ORDER — MEMANTINE HYDROCHLORIDE 10 MG/1
0 TABLET ORAL
Qty: 0 | Refills: 0 | DISCHARGE

## 2023-03-15 RX ORDER — GRANISETRON HYDROCHLORIDE 1 MG/1
1 TABLET, FILM COATED ORAL
Qty: 0 | Refills: 0 | DISCHARGE

## 2023-03-15 RX ADMIN — Medication 0.5 MILLIGRAM(S): at 12:51

## 2023-03-15 RX ADMIN — Medication 650 MILLIGRAM(S): at 14:27

## 2023-03-15 RX ADMIN — Medication 100 MILLIGRAM(S): at 06:29

## 2023-03-15 RX ADMIN — Medication 1 GRAM(S): at 21:43

## 2023-03-15 RX ADMIN — Medication 50 MILLIGRAM(S): at 17:54

## 2023-03-15 RX ADMIN — Medication 3 MILLIGRAM(S): at 01:50

## 2023-03-15 RX ADMIN — MEMANTINE HYDROCHLORIDE 10 MILLIGRAM(S): 10 TABLET ORAL at 06:27

## 2023-03-15 RX ADMIN — Medication 1000 MILLIGRAM(S): at 02:14

## 2023-03-15 RX ADMIN — ALBUTEROL 2.5 MILLIGRAM(S): 90 AEROSOL, METERED ORAL at 20:41

## 2023-03-15 RX ADMIN — Medication 1: at 12:19

## 2023-03-15 RX ADMIN — HYDROMORPHONE HYDROCHLORIDE 4 MILLIGRAM(S): 2 INJECTION INTRAMUSCULAR; INTRAVENOUS; SUBCUTANEOUS at 12:51

## 2023-03-15 RX ADMIN — HYDROMORPHONE HYDROCHLORIDE 4 MILLIGRAM(S): 2 INJECTION INTRAMUSCULAR; INTRAVENOUS; SUBCUTANEOUS at 13:45

## 2023-03-15 RX ADMIN — ATORVASTATIN CALCIUM 40 MILLIGRAM(S): 80 TABLET, FILM COATED ORAL at 21:41

## 2023-03-15 RX ADMIN — METFORMIN HYDROCHLORIDE 500 MILLIGRAM(S): 850 TABLET ORAL at 01:50

## 2023-03-15 RX ADMIN — Medication 50 MILLIGRAM(S): at 09:52

## 2023-03-15 RX ADMIN — ALBUTEROL 2.5 MILLIGRAM(S): 90 AEROSOL, METERED ORAL at 13:56

## 2023-03-15 RX ADMIN — Medication 1: at 09:11

## 2023-03-15 RX ADMIN — Medication 600 MILLIGRAM(S): at 06:26

## 2023-03-15 RX ADMIN — Medication 1 GRAM(S): at 17:55

## 2023-03-15 RX ADMIN — MODAFINIL 400 MILLIGRAM(S): 200 TABLET ORAL at 12:50

## 2023-03-15 RX ADMIN — ATORVASTATIN CALCIUM 40 MILLIGRAM(S): 80 TABLET, FILM COATED ORAL at 01:50

## 2023-03-15 RX ADMIN — MEMANTINE HYDROCHLORIDE 10 MILLIGRAM(S): 10 TABLET ORAL at 17:55

## 2023-03-15 RX ADMIN — METFORMIN HYDROCHLORIDE 500 MILLIGRAM(S): 850 TABLET ORAL at 21:41

## 2023-03-15 RX ADMIN — CELECOXIB 200 MILLIGRAM(S): 200 CAPSULE ORAL at 13:30

## 2023-03-15 RX ADMIN — Medication 1: at 17:38

## 2023-03-15 RX ADMIN — Medication 50 MILLIGRAM(S): at 02:50

## 2023-03-15 RX ADMIN — Medication 600 MILLIGRAM(S): at 17:55

## 2023-03-15 RX ADMIN — Medication 100 MILLIGRAM(S): at 14:25

## 2023-03-15 RX ADMIN — PANTOPRAZOLE SODIUM 40 MILLIGRAM(S): 20 TABLET, DELAYED RELEASE ORAL at 06:27

## 2023-03-15 RX ADMIN — Medication 1 GRAM(S): at 06:27

## 2023-03-15 RX ADMIN — Medication 1 GRAM(S): at 12:51

## 2023-03-15 RX ADMIN — Medication 650 MILLIGRAM(S): at 15:30

## 2023-03-15 RX ADMIN — ONDANSETRON 4 MILLIGRAM(S): 8 TABLET, FILM COATED ORAL at 17:39

## 2023-03-15 RX ADMIN — CELECOXIB 200 MILLIGRAM(S): 200 CAPSULE ORAL at 12:52

## 2023-03-15 RX ADMIN — Medication 225 MICROGRAM(S): at 06:26

## 2023-03-15 RX ADMIN — ENOXAPARIN SODIUM 40 MILLIGRAM(S): 100 INJECTION SUBCUTANEOUS at 01:50

## 2023-03-15 RX ADMIN — ALBUTEROL 2.5 MILLIGRAM(S): 90 AEROSOL, METERED ORAL at 07:36

## 2023-03-15 RX ADMIN — Medication 100 MILLIGRAM(S): at 21:43

## 2023-03-15 NOTE — CONSULT NOTE ADULT - ASSESSMENT
62-year-old female with past medical history of YULIA cervical radiculopathy Cushing syndrome diabetes fibromyalgia gastroparesis GERD hypothyroidism on kidney stones migraines mitral valve prolapse neurogenic bladder osteoarthritis Raynaud's coming in complaining of fever for the last few days. 62-year-old female with past medical history of YULIA cervical radiculopathy Cushing syndrome diabetes fibromyalgia gastroparesis GERD hypothyroidism on kidney stones migraines mitral valve prolapse neurogenic bladder osteoarthritis Raynaud's coming in complaining of fever for the last few days.    cough - malaise - poss lower resp tract infection  lactic acid trend noted  cxr reviewed - old imaging compared  emp ABX  trend WBC  cough rx regimen  on nebs  monitor VS and HD  cont med rx regimen  pt is known to me from prior admissions - old records reviewed  RVP - URI - sx management - tylenol - cough rx regimen   nutrition  emotional support  keep sat > 90 pct  discussed plan of care with the patient

## 2023-03-15 NOTE — H&P ADULT - NSICDXFAMILYHX_GEN_ALL_CORE_FT
FAMILY HISTORY:  Family history of CHF (congestive heart failure)    Sibling  Still living? Unknown  Family history of hypothyroidism, Age at diagnosis: Age Unknown

## 2023-03-15 NOTE — CARE COORDINATION ASSESSMENT. - OTHER PERTINENT DISCHARGE PLANNING INFORMATION:
met with patient to introduce self. Role of case management at the hospital explained, patient verbalized understanding. Patient stated she is independent. she is on oxygen at present, but not have any home O2. Patient verbalized that she is prone to aspiration pneumonia because she  has "reflux and other GI issues". She said the house where she lives has 4 steps outside and 10-12 steps to the basement, no issues climbing the steps at present. Patient verbalized that she is disabled from a fall to a basement on 11/15/1980.  Her workers com case is with [employer] Case # [id number] [address] (she is not sure because it changes). Her workers' s comp is related the the injury to her knees. Patient understands that case management will remain available. CM contact information left with patient.

## 2023-03-15 NOTE — H&P ADULT - HISTORY OF PRESENT ILLNESS
Patient is a 62-year-old female with past medical history of YULIA cervical radiculopathy Cushing syndrome diabetes fibromyalgia gastroparesis GERD hypothyroidism on kidney stones migraines mitral valve prolapse neurogenic bladder osteoarthritis Raynaud's coming in complaining of fever for the last few days.  Patient states cough and sore throat started yesterday.  Patient denies any abdominal pain nausea vomiting diarrhea chest pain or shortness of breath.  Patient went to urgent care flu and COVID done advised to come to emergency room due to low oxygen and rapid heart rate.  Test result pending.  Patient denies any recent travels leg swelling or sick contacts.  Patient vaccinated for COVID and flu. Patient received several rounds of nebs and solumedrol 125mg ivp x 1 without much improvement. She continued to desaturate when taken off the oxygen.

## 2023-03-15 NOTE — CARE COORDINATION ASSESSMENT. - NSPASTMEDSURGHISTORY_GEN_ALL_CORE_FT
PAST MEDICAL & SURGICAL HISTORY:  Gastroparesis      Kidney stone  left      Hypothyroidism      Neurogenic bladder      TMJ (dislocation of temporomandibular joint)      Cervical radiculopathy      GERD (gastroesophageal reflux disease)      MVP (mitral valve prolapse)      Reflex sympathetic dystrophy      Cushing&#x27;s syndrome      Migraine      Osteoarthritis      Thyroiditis  hashimottos      Fibromyalgia      Raynaud&#x27;s disease      Acute kidney injury      S/P knee surgery      S/P cholecystectomy      Diabetes

## 2023-03-15 NOTE — PATIENT PROFILE ADULT - FALL HARM RISK - HARM RISK INTERVENTIONS

## 2023-03-15 NOTE — CONSULT NOTE ADULT - SUBJECTIVE AND OBJECTIVE BOX
Date/Time Patient Seen:  		  Referring MD:   Data Reviewed	       Patient is a 62y old  Female who presents with a chief complaint of     Subjective/HPI   · Chief Complaint: The patient is a 62y Female complaining of flu-like symptoms.  · HPI Objective Statement: Patient is a 62-year-old female with past medical history of YULIA cervical radiculopathy Cushing syndrome diabetes fibromyalgia gastroparesis GERD hypothyroidism on kidney stones migraines mitral valve prolapse neurogenic bladder osteoarthritis Raynaud's coming in complaining of fever for the last few days.  Patient states cough and sore throat started yesterday.  Patient denies any abdominal pain nausea vomiting diarrhea chest pain or shortness of breath.  Patient went to urgent care flu and COVID done advised to come to emergency room due to low oxygen and rapid heart rate.  Test result pending.  Patient denies any recent travels leg swelling or sick contacts.  Patient vaccinated for COVID and flu.    PAST MEDICAL & SURGICAL HISTORY:  Raynaud&#x27;s disease    Fibromyalgia    Thyroiditis  hashimottos    Osteoarthritis    Migraine    Cushing&#x27;s syndrome    Reflex sympathetic dystrophy    MVP (mitral valve prolapse)    GERD (gastroesophageal reflux disease)    Cervical radiculopathy    TMJ (dislocation of temporomandibular joint)    Neurogenic bladder    Hypothyroidism    Kidney stone  left    Gastroparesis    Acute kidney injury    Diabetes    S/P cholecystectomy    S/P knee surgery    HIV:    HIV Test Questions:  · In accordance with [place] State law, we offer every patient who comes to our ED an HIV test. Would you like to be tested today?	Opt out    PAST MEDICAL/SURGICAL/FAMILY/SOCIAL HISTORY:    Tobacco Usage:  · Tobacco Usage	Unknown if ever smoked    ALLERGIES AND HOME MEDICATIONS:   Allergies:        Allergies:  	codeine: Drug, Hives  	Pyridium: Drug, Hives  	Robaxin: Drug, Hives  	tetracycline: Drug, Hives  	Ceclor: Drug, Hives  	sulfa drugs: Drug Category, Hives  	vancomycin: Drug, Rash  	Donnatal: Drug, Rash  	Vioxx: Drug, Rash  	fish: Food, Patient, Nausea  	Betadine: Drug, Unknown  	ProBarimin QT: Drug, Unknown, allergy to probanthine  	citrus: Food, Unknown  	shellfish: Food, Unknown  	vicryl sutures,: Miscellaneous, Unknown, vicryl sutures,  	neoprene brace material: Miscellaneous, Unknown, neoprene brace material       Intolerances:  	Benadryl: Drug, Unknown  	atropine: Drug, Unknown    Home Medications:   * Patient Currently Takes Medications as of 03-Jul-2021 01:10 documented in Structured Notes  · 	Zithromax TRI-SUMA 500 mg oral tablet: 1 tab(s) orally once a day   · 	ProAir HFA 90 mcg/inh inhalation aerosol: 2 puff(s) inhaled every 6 hours   · 	Tessalon Perles 100 mg oral capsule: 1 cap(s) orally 3 times a day   · 	Relistor 8 mg/0.4 mL subcutaneous solution:  subcutaneous every other day  · 	SUMAtriptan 25 mg oral tablet: 1 tab(s) orally once a day, As needed, migraine  · 	hydrocortisone 10 mg oral tablet: 1 tab(s) orally once a day  · 	sucralfate 1 g oral tablet: 1 tab(s) orally 2 times a day  · 	erythromycin 250 mg oral tablet: 1 tab(s) orally 3 times a day  · 	fentaNYL 100 mcg/hr transdermal film, extended release: 1 patch transdermal every 72 hours  · 	atorvastatin 20 mg oral tablet: 1 tab(s) orally once a day (at bedtime)  · 	levothyroxine 175 mcg (0.175 mg) oral tablet: 1 tab(s) orally once a day  · 	Zofran 4 mg oral tablet:  orally every 8 hours prn nausea  · 	Linzess 290 mcg oral capsule: 1 cap(s) orally once a day  · 	Namenda 10 mg oral tablet: 1 tab(s) orally 2 times a day  · 	Boniva 150 mg oral tablet: 1 tab(s) orally once a month  · 	Pataday 0.2% ophthalmic solution: 1 drop(s) to each affected eye once a day  · 	GenTeal - ophthalmic gel: 1 drop(s) to each affected eye 2 times a day  · 	Restasis 0.05% ophthalmic emulsion: 1 drop(s) to each affected eye every 12 hours  · 	Bystolic 5 mg oral tablet: 1 tab(s) orally once a day  · 	aspirin 81 mg oral tablet: 1 tab(s) orally once a day  · 	Kapidex: 60 milligram(s) orally 2 times a day  · 	Klonopin 0.5 mg oral tablet: 1 tab(s) orally 3 times a day  · 	Celebrex 200 mg oral capsule:  orally once a day  · 	Veramyst 27.5 mcg/inh nasal spray: 2 spray(s) nasal once a day (at bedtime)  · 	Zantac 300 300 mg oral tablet:  orally 2 times a day  · 	Proventil CFC free 90 mcg/inh inhalation aerosol:  inhaled every 6 hour as needed  · 	Reglan 5 mg oral tablet: 1 tab(s) orally 4 times a day (before meals and at bedtime) as needed     REVIEW OF SYSTEMS:    Review of Systems:  · ROS STATEMENT: all other ROS negative except as per HPI    VITAL SIGNS ([place]):    ,,ED ADULT Flow Sheet:    14-Mar-2023 17:26  · Temp (F): 100.3  · Temp (C) Temp (C): 37.9  · Temp site Temp Site: oral  · Heart Rate Heart Rate (beats/min): 136  · BP Systolic Systolic: 132  · BP Diastolic Diastolic (mm Hg): 89  · Respiration Rate (breaths/min) Respiration Rate (breaths/min): 26  · SpO2 (%) SpO2 (%): 92  · O2 Delivery/Oxygen Delivery Method Patient On (Oxygen Delivery Method): room air  · How was the weight captured? Weight Type/Method: stated  · Dosing Weight (KILOGRAMS) Dosing Weight (KILOGRAMS): 68.9  · Dosing Weight  (POUNDS) Dosing Weight (POUNDS): 151.8  · Height type Height Type: stated  · Height (FEET) Height (FEET): 5  · SpO2 (%) SpO2 (%): 92  · Preferred Language to Address Healthcare Preferred Language to Address Healthcare: English        Medication list         MEDICATIONS  (STANDING):  albuterol    0.083% 2.5 milliGRAM(s) Nebulizer every 6 hours  atorvastatin 40 milliGRAM(s) Oral at bedtime  celecoxib 200 milliGRAM(s) Oral daily  clonazePAM  Tablet 0.5 milliGRAM(s) Oral daily  dextrose 5%. 1000 milliLiter(s) (50 mL/Hr) IV Continuous <Continuous>  dextrose 5%. 1000 milliLiter(s) (100 mL/Hr) IV Continuous <Continuous>  dextrose 50% Injectable 25 Gram(s) IV Push once  dextrose 50% Injectable 12.5 Gram(s) IV Push once  dextrose 50% Injectable 25 Gram(s) IV Push once  enoxaparin Injectable 40 milliGRAM(s) SubCutaneous every 24 hours  fentaNYL   Patch  25 MICROgram(s)/Hr 1 Patch Transdermal every 72 hours  glucagon  Injectable 1 milliGRAM(s) IntraMuscular once  guaiFENesin  milliGRAM(s) Oral every 12 hours  hydrocortisone sodium succinate Injectable 50 milliGRAM(s) IV Push every 8 hours  insulin lispro (ADMELOG) corrective regimen sliding scale   SubCutaneous three times a day before meals  insulin lispro (ADMELOG) corrective regimen sliding scale   SubCutaneous at bedtime  levoFLOXacin IVPB 750 milliGRAM(s) IV Intermittent every 24 hours  levothyroxine 225 MICROGram(s) Oral daily  memantine 10 milliGRAM(s) Oral two times a day  metFORMIN 500 milliGRAM(s) Oral at bedtime  modafinil 400 milliGRAM(s) Oral daily  pantoprazole    Tablet 40 milliGRAM(s) Oral before breakfast  pregabalin 100 milliGRAM(s) Oral three times a day  sucralfate 1 Gram(s) Oral four times a day    MEDICATIONS  (PRN):  acetaminophen     Tablet .. 650 milliGRAM(s) Oral every 6 hours PRN Temp greater or equal to 38C (100.4F), Mild Pain (1 - 3)  aluminum hydroxide/magnesium hydroxide/simethicone Suspension 30 milliLiter(s) Oral every 4 hours PRN Dyspepsia  dextrose Oral Gel 15 Gram(s) Oral once PRN Blood Glucose LESS THAN 70 milliGRAM(s)/deciliter  guaiFENesin Oral Liquid (Sugar-Free) 100 milliGRAM(s) Oral every 6 hours PRN Cough  HYDROmorphone   Tablet 4 milliGRAM(s) Oral daily PRN Moderate Pain (4 - 6)  melatonin 3 milliGRAM(s) Oral at bedtime PRN Insomnia  ondansetron Injectable 4 milliGRAM(s) IV Push every 6 hours PRN Nausea and/or Vomiting         Vitals log        ICU Vital Signs Last 24 Hrs  T(C): 36.7 (15 Mar 2023 04:05), Max: 37.9 (14 Mar 2023 17:26)  T(F): 98.1 (15 Mar 2023 04:05), Max: 100.3 (14 Mar 2023 17:26)  HR: 95 (15 Mar 2023 04:05) (95 - 136)  BP: 108/71 (15 Mar 2023 04:05) (102/55 - 132/89)  BP(mean): --  ABP: --  ABP(mean): --  RR: 20 (15 Mar 2023 04:05) (20 - 28)  SpO2: 98% (15 Mar 2023 04:05) (92% - 98%)    O2 Parameters below as of 15 Mar 2023 04:05  Patient On (Oxygen Delivery Method): nasal cannula  O2 Flow (L/min): 2               Input and Output:  I&O's Detail      Lab Data                        12.6   12.62 )-----------( 238      ( 14 Mar 2023 18:20 )             38.7     03-14    138  |  104  |  28<H>  ----------------------------<  143<H>  3.8   |  28  |  0.84    Ca    9.4      14 Mar 2023 18:20    TPro  7.7  /  Alb  3.7  /  TBili  1.0  /  DBili  x   /  AST  51<H>  /  ALT  96<H>  /  AlkPhos  223<H>  03-14            Review of Systems	      Objective     Physical Examination        Pertinent Lab findings & Imaging      Dale:  NO   Adequate UO     I&O's Detail           Discussed with:     Cultures:	        Radiology                             Date/Time Patient Seen:  		  Referring MD:   Data Reviewed	       Patient is a 62y old  Female who presents with a chief complaint of     Subjective/HPI  in bed  seen and examined  vs noted  cough reported  lives at home with brother  non smoker  non drinker  alert  oriented  h and p reviewed  er provider note reviewed     · Chief Complaint: The patient is a 62y Female complaining of flu-like symptoms.  · HPI Objective Statement: Patient is a 62-year-old female with past medical history of YULIA cervical radiculopathy Cushing syndrome diabetes fibromyalgia gastroparesis GERD hypothyroidism on kidney stones migraines mitral valve prolapse neurogenic bladder osteoarthritis Raynaud's coming in complaining of fever for the last few days.  Patient states cough and sore throat started yesterday.  Patient denies any abdominal pain nausea vomiting diarrhea chest pain or shortness of breath.  Patient went to urgent care flu and COVID done advised to come to emergency room due to low oxygen and rapid heart rate.  Test result pending.  Patient denies any recent travels leg swelling or sick contacts.  Patient vaccinated for COVID and flu.    PAST MEDICAL & SURGICAL HISTORY:  Raynaud&#x27;s disease    Fibromyalgia    Thyroiditis  hashimottos    Osteoarthritis    Migraine    Cushing&#x27;s syndrome    Reflex sympathetic dystrophy    MVP (mitral valve prolapse)    GERD (gastroesophageal reflux disease)    Cervical radiculopathy    TMJ (dislocation of temporomandibular joint)    Neurogenic bladder    Hypothyroidism    Kidney stone  left    Gastroparesis    Acute kidney injury    Diabetes    S/P cholecystectomy    S/P knee surgery    HIV:    HIV Test Questions:  · In accordance with [place] law, we offer every patient who comes to our ED an HIV test. Would you like to be tested today?	Opt out    PAST MEDICAL/SURGICAL/FAMILY/SOCIAL HISTORY:    Tobacco Usage:  · Tobacco Usage	Unknown if ever smoked    ALLERGIES AND HOME MEDICATIONS:   Allergies:        Allergies:  	codeine: Drug, Hives  	Pyridium: Drug, Hives  	Robaxin: Drug, Hives  	tetracycline: Drug, Hives  	Ceclor: Drug, Hives  	sulfa drugs: Drug Category, Hives  	vancomycin: Drug, Rash  	Donnatal: Drug, Rash  	Vioxx: Drug, Rash  	fish: Food, Patient, Nausea  	Betadine: Drug, Unknown  	ProBarimin QT: Drug, Unknown, allergy to probanthine  	citrus: Food, Unknown  	shellfish: Food, Unknown  	vicryl sutures,: Miscellaneous, Unknown, vicryl sutures,  	neoprene brace material: Miscellaneous, Unknown, neoprene brace material       Intolerances:  	Benadryl: Drug, Unknown  	atropine: Drug, Unknown    Home Medications:   * Patient Currently Takes Medications as of 03-Jul-2021 01:10 documented in Structured Notes  · 	Zithromax TRI-SUMA 500 mg oral tablet: 1 tab(s) orally once a day   · 	ProAir HFA 90 mcg/inh inhalation aerosol: 2 puff(s) inhaled every 6 hours   · 	Tessalon Perles 100 mg oral capsule: 1 cap(s) orally 3 times a day   · 	Relistor 8 mg/0.4 mL subcutaneous solution:  subcutaneous every other day  · 	SUMAtriptan 25 mg oral tablet: 1 tab(s) orally once a day, As needed, migraine  · 	hydrocortisone 10 mg oral tablet: 1 tab(s) orally once a day  · 	sucralfate 1 g oral tablet: 1 tab(s) orally 2 times a day  · 	erythromycin 250 mg oral tablet: 1 tab(s) orally 3 times a day  · 	fentaNYL 100 mcg/hr transdermal film, extended release: 1 patch transdermal every 72 hours  · 	atorvastatin 20 mg oral tablet: 1 tab(s) orally once a day (at bedtime)  · 	levothyroxine 175 mcg (0.175 mg) oral tablet: 1 tab(s) orally once a day  · 	Zofran 4 mg oral tablet:  orally every 8 hours prn nausea  · 	Linzess 290 mcg oral capsule: 1 cap(s) orally once a day  · 	Namenda 10 mg oral tablet: 1 tab(s) orally 2 times a day  · 	Boniva 150 mg oral tablet: 1 tab(s) orally once a month  · 	Pataday 0.2% ophthalmic solution: 1 drop(s) to each affected eye once a day  · 	GenTeal - ophthalmic gel: 1 drop(s) to each affected eye 2 times a day  · 	Restasis 0.05% ophthalmic emulsion: 1 drop(s) to each affected eye every 12 hours  · 	Bystolic 5 mg oral tablet: 1 tab(s) orally once a day  · 	aspirin 81 mg oral tablet: 1 tab(s) orally once a day  · 	Kapidex: 60 milligram(s) orally 2 times a day  · 	Klonopin 0.5 mg oral tablet: 1 tab(s) orally 3 times a day  · 	Celebrex 200 mg oral capsule:  orally once a day  · 	Veramyst 27.5 mcg/inh nasal spray: 2 spray(s) nasal once a day (at bedtime)  · 	Zantac 300 300 mg oral tablet:  orally 2 times a day  · 	Proventil CFC free 90 mcg/inh inhalation aerosol:  inhaled every 6 hour as needed  · 	Reglan 5 mg oral tablet: 1 tab(s) orally 4 times a day (before meals and at bedtime) as needed     REVIEW OF SYSTEMS:    Review of Systems:  · ROS STATEMENT: all other ROS negative except as per HPI    VITAL SIGNS ([place]):    ,,ED ADULT Flow Sheet:    14-Mar-2023 17:26  · Temp (F): 100.3  · Temp (C) Temp (C): 37.9  · Temp site Temp Site: oral  · Heart Rate Heart Rate (beats/min): 136  · BP Systolic Systolic: 132  · BP Diastolic Diastolic (mm Hg): 89  · Respiration Rate (breaths/min) Respiration Rate (breaths/min): 26  · SpO2 (%) SpO2 (%): 92  · O2 Delivery/Oxygen Delivery Method Patient On (Oxygen Delivery Method): room air  · How was the weight captured? Weight Type/Method: stated  · Dosing Weight (KILOGRAMS) Dosing Weight (KILOGRAMS): 68.9  · Dosing Weight  (POUNDS) Dosing Weight (POUNDS): 151.8  · Height type Height Type: stated  · Height (FEET) Height (FEET): 5  · SpO2 (%) SpO2 (%): 92  · Preferred Language to Address Healthcare Preferred Language to Address Healthcare: English        Medication list         MEDICATIONS  (STANDING):  albuterol    0.083% 2.5 milliGRAM(s) Nebulizer every 6 hours  atorvastatin 40 milliGRAM(s) Oral at bedtime  celecoxib 200 milliGRAM(s) Oral daily  clonazePAM  Tablet 0.5 milliGRAM(s) Oral daily  dextrose 5%. 1000 milliLiter(s) (50 mL/Hr) IV Continuous <Continuous>  dextrose 5%. 1000 milliLiter(s) (100 mL/Hr) IV Continuous <Continuous>  dextrose 50% Injectable 25 Gram(s) IV Push once  dextrose 50% Injectable 12.5 Gram(s) IV Push once  dextrose 50% Injectable 25 Gram(s) IV Push once  enoxaparin Injectable 40 milliGRAM(s) SubCutaneous every 24 hours  fentaNYL   Patch  25 MICROgram(s)/Hr 1 Patch Transdermal every 72 hours  glucagon  Injectable 1 milliGRAM(s) IntraMuscular once  guaiFENesin  milliGRAM(s) Oral every 12 hours  hydrocortisone sodium succinate Injectable 50 milliGRAM(s) IV Push every 8 hours  insulin lispro (ADMELOG) corrective regimen sliding scale   SubCutaneous three times a day before meals  insulin lispro (ADMELOG) corrective regimen sliding scale   SubCutaneous at bedtime  levoFLOXacin IVPB 750 milliGRAM(s) IV Intermittent every 24 hours  levothyroxine 225 MICROGram(s) Oral daily  memantine 10 milliGRAM(s) Oral two times a day  metFORMIN 500 milliGRAM(s) Oral at bedtime  modafinil 400 milliGRAM(s) Oral daily  pantoprazole    Tablet 40 milliGRAM(s) Oral before breakfast  pregabalin 100 milliGRAM(s) Oral three times a day  sucralfate 1 Gram(s) Oral four times a day    MEDICATIONS  (PRN):  acetaminophen     Tablet .. 650 milliGRAM(s) Oral every 6 hours PRN Temp greater or equal to 38C (100.4F), Mild Pain (1 - 3)  aluminum hydroxide/magnesium hydroxide/simethicone Suspension 30 milliLiter(s) Oral every 4 hours PRN Dyspepsia  dextrose Oral Gel 15 Gram(s) Oral once PRN Blood Glucose LESS THAN 70 milliGRAM(s)/deciliter  guaiFENesin Oral Liquid (Sugar-Free) 100 milliGRAM(s) Oral every 6 hours PRN Cough  HYDROmorphone   Tablet 4 milliGRAM(s) Oral daily PRN Moderate Pain (4 - 6)  melatonin 3 milliGRAM(s) Oral at bedtime PRN Insomnia  ondansetron Injectable 4 milliGRAM(s) IV Push every 6 hours PRN Nausea and/or Vomiting         Vitals log        ICU Vital Signs Last 24 Hrs  T(C): 36.7 (15 Mar 2023 04:05), Max: 37.9 (14 Mar 2023 17:26)  T(F): 98.1 (15 Mar 2023 04:05), Max: 100.3 (14 Mar 2023 17:26)  HR: 95 (15 Mar 2023 04:05) (95 - 136)  BP: 108/71 (15 Mar 2023 04:05) (102/55 - 132/89)  BP(mean): --  ABP: --  ABP(mean): --  RR: 20 (15 Mar 2023 04:05) (20 - 28)  SpO2: 98% (15 Mar 2023 04:05) (92% - 98%)    O2 Parameters below as of 15 Mar 2023 04:05  Patient On (Oxygen Delivery Method): nasal cannula  O2 Flow (L/min): 2               Input and Output:  I&O's Detail      Lab Data                        12.6   12.62 )-----------( 238      ( 14 Mar 2023 18:20 )             38.7     03-14    138  |  104  |  28<H>  ----------------------------<  143<H>  3.8   |  28  |  0.84    Ca    9.4      14 Mar 2023 18:20    TPro  7.7  /  Alb  3.7  /  TBili  1.0  /  DBili  x   /  AST  51<H>  /  ALT  96<H>  /  AlkPhos  223<H>  03-14            Review of Systems	  cough  weakness  malaise      Objective     Physical Examination    heart s1s2  lung dec BS  head nc  verbal  alert  cn grossly  int  moves all extr      Pertinent Lab findings & Imaging      Hunter:  NO   Adequate UO     I&O's Detail           Discussed with:     Cultures:	        Radiology    cxr noted  old imaging reviewed

## 2023-03-15 NOTE — H&P ADULT - PROBLEM SELECTOR PLAN 1
Possibly multifactorial -- 2/2 to viral PNA/acute bronchitis 2/2 enterovirus vs 2/2 to bacterial PNA  Admit  Continue empiric Levaquin  Check CT chest  Isolation precautions  Cough suppressants  Oxygen prn  Check procalcitonin  Trend WBC  Continue IV steroids, nebs  Pulmonary consult  Further work-up/management pending clinical course.

## 2023-03-15 NOTE — CARE COORDINATION ASSESSMENT. - NSCAREPROVIDERS_GEN_ALL_CORE_FT
CARE PROVIDERS:  Accepting Physician: Arthur Payton  Administration: Carey Marie  Administration: Parviz Ellis  Administration: Leslie Bonilla  Admitting: Arthur Payton  Attending: Arthur Payton  Case Management: Merly Limon  Case Management: Tone Reyes  Clinical Doc. Improvement: Rita Goins  Consultant: Kaz Gilmore  Covering Team: Arthur Payton  ED ACP: Tony Mckeon  ED Attending: Reyna Felder  ED Nurse: Femi Bradshaw  Nurse: Keyona Whitten  Nurse: Kaylin Guadalupe  Nurse: Eleni Houston  Nurse: Mir Mccray  Ordered: ServiceAccount, [facility]MLM  Ordered: Doctor, Unknown  Ordered: ADM, User  Override: Chelsea Rodriguez  Override: Eleni Houston  PCA/Nursing Assistant: Leda Lancaster  PCA/Nursing Assistant: Coni Sinclair  Registered Dietitian: Caprice Johnson  Respiratory Therapy: Kendall Arango  Respiratory Therapy: Lion Stinson

## 2023-03-15 NOTE — CHART NOTE - NSCHARTNOTEFT_GEN_A_CORE
CT no PNA  Will D/C iv abx    < from: CT Chest No Cont (03.15.23 @ 08:49) >      ACC: [id number] EXAM:  CT CHEST   ORDERED BY: JORGE PITTMAN     PROCEDURE DATE:  03/15/2023          INTERPRETATION:  HISTORY: Admitting Dxs: R06.02 SHORTNESS OF BREATH   cough. Shortness of breath. Rule out pneumonia.    EXAMINATION: CT CHEST was performed without IV contrast.    COMPARISON: 7/2/2021.    FINDINGS:    AIRWAYS, LUNGS, PLEURA: Clear central airways. Subsegmental bilateral   lower lobe atelectasis. No definite consolidation. Right upper lobe   calcified granuloma. No pleural effusion.    MEDIASTINUM: Normal heart size. No pericardial effusion. Thoracic aorta   normal caliber.  No large mediastinal lymph nodes.    IMAGED ABDOMEN: Cholecystectomy.    SOFT TISSUES: Unremarkable.    BONES: Unremarkable.      IMPRESSION:.    No evidence of pneumonia.    --- End of Report ---             DANIELA ROSS MD; Attending Radiologist  This document has been electronically signed. Mar 15 2023  9:29AM    < end of copied text >

## 2023-03-16 LAB
ANION GAP SERPL CALC-SCNC: 6 MMOL/L — SIGNIFICANT CHANGE UP (ref 5–17)
BUN SERPL-MCNC: 22 MG/DL — SIGNIFICANT CHANGE UP (ref 7–23)
CALCIUM SERPL-MCNC: 9.2 MG/DL — SIGNIFICANT CHANGE UP (ref 8.5–10.1)
CHLORIDE SERPL-SCNC: 109 MMOL/L — HIGH (ref 96–108)
CO2 SERPL-SCNC: 28 MMOL/L — SIGNIFICANT CHANGE UP (ref 22–31)
CREAT SERPL-MCNC: 0.86 MG/DL — SIGNIFICANT CHANGE UP (ref 0.5–1.3)
CULTURE RESULTS: SIGNIFICANT CHANGE UP
EGFR: 77 ML/MIN/1.73M2 — SIGNIFICANT CHANGE UP
GLUCOSE SERPL-MCNC: 131 MG/DL — HIGH (ref 70–99)
HCT VFR BLD CALC: 34.6 % — SIGNIFICANT CHANGE UP (ref 34.5–45)
HCV AB S/CO SERPL IA: 0.09 S/CO — SIGNIFICANT CHANGE UP (ref 0–0.99)
HCV AB SERPL-IMP: SIGNIFICANT CHANGE UP
HGB BLD-MCNC: 11.2 G/DL — LOW (ref 11.5–15.5)
MAGNESIUM SERPL-MCNC: 2.1 MG/DL — SIGNIFICANT CHANGE UP (ref 1.6–2.6)
MCHC RBC-ENTMCNC: 30.9 PG — SIGNIFICANT CHANGE UP (ref 27–34)
MCHC RBC-ENTMCNC: 32.4 GM/DL — SIGNIFICANT CHANGE UP (ref 32–36)
MCV RBC AUTO: 95.6 FL — SIGNIFICANT CHANGE UP (ref 80–100)
NRBC # BLD: 0 /100 WBCS — SIGNIFICANT CHANGE UP (ref 0–0)
PLATELET # BLD AUTO: 243 K/UL — SIGNIFICANT CHANGE UP (ref 150–400)
POTASSIUM SERPL-MCNC: 3.5 MMOL/L — SIGNIFICANT CHANGE UP (ref 3.5–5.3)
POTASSIUM SERPL-SCNC: 3.5 MMOL/L — SIGNIFICANT CHANGE UP (ref 3.5–5.3)
RBC # BLD: 3.62 M/UL — LOW (ref 3.8–5.2)
RBC # FLD: 12.8 % — SIGNIFICANT CHANGE UP (ref 10.3–14.5)
SODIUM SERPL-SCNC: 143 MMOL/L — SIGNIFICANT CHANGE UP (ref 135–145)
SPECIMEN SOURCE: SIGNIFICANT CHANGE UP
WBC # BLD: 13.21 K/UL — HIGH (ref 3.8–10.5)
WBC # FLD AUTO: 13.21 K/UL — HIGH (ref 3.8–10.5)

## 2023-03-16 RX ORDER — POLYETHYLENE GLYCOL 3350 17 G/17G
17 POWDER, FOR SOLUTION ORAL ONCE
Refills: 0 | Status: COMPLETED | OUTPATIENT
Start: 2023-03-16 | End: 2023-03-16

## 2023-03-16 RX ADMIN — HYDROMORPHONE HYDROCHLORIDE 4 MILLIGRAM(S): 2 INJECTION INTRAMUSCULAR; INTRAVENOUS; SUBCUTANEOUS at 13:20

## 2023-03-16 RX ADMIN — Medication 1 GRAM(S): at 05:49

## 2023-03-16 RX ADMIN — CELECOXIB 200 MILLIGRAM(S): 200 CAPSULE ORAL at 13:00

## 2023-03-16 RX ADMIN — ALBUTEROL 2.5 MILLIGRAM(S): 90 AEROSOL, METERED ORAL at 07:43

## 2023-03-16 RX ADMIN — MODAFINIL 400 MILLIGRAM(S): 200 TABLET ORAL at 12:25

## 2023-03-16 RX ADMIN — Medication 600 MILLIGRAM(S): at 17:41

## 2023-03-16 RX ADMIN — MEMANTINE HYDROCHLORIDE 10 MILLIGRAM(S): 10 TABLET ORAL at 17:40

## 2023-03-16 RX ADMIN — Medication 100 MILLIGRAM(S): at 21:31

## 2023-03-16 RX ADMIN — Medication 100 MILLIGRAM(S): at 05:49

## 2023-03-16 RX ADMIN — MEMANTINE HYDROCHLORIDE 10 MILLIGRAM(S): 10 TABLET ORAL at 05:47

## 2023-03-16 RX ADMIN — Medication 100 MILLIGRAM(S): at 14:35

## 2023-03-16 RX ADMIN — PANTOPRAZOLE SODIUM 40 MILLIGRAM(S): 20 TABLET, DELAYED RELEASE ORAL at 06:40

## 2023-03-16 RX ADMIN — Medication 50 MILLIGRAM(S): at 02:05

## 2023-03-16 RX ADMIN — Medication 1 GRAM(S): at 23:42

## 2023-03-16 RX ADMIN — ONDANSETRON 4 MILLIGRAM(S): 8 TABLET, FILM COATED ORAL at 05:50

## 2023-03-16 RX ADMIN — ALBUTEROL 2.5 MILLIGRAM(S): 90 AEROSOL, METERED ORAL at 13:29

## 2023-03-16 RX ADMIN — Medication 50 MILLIGRAM(S): at 10:34

## 2023-03-16 RX ADMIN — METFORMIN HYDROCHLORIDE 500 MILLIGRAM(S): 850 TABLET ORAL at 21:31

## 2023-03-16 RX ADMIN — Medication 1 GRAM(S): at 12:26

## 2023-03-16 RX ADMIN — Medication 225 MICROGRAM(S): at 05:49

## 2023-03-16 RX ADMIN — ATORVASTATIN CALCIUM 40 MILLIGRAM(S): 80 TABLET, FILM COATED ORAL at 21:31

## 2023-03-16 RX ADMIN — Medication 50 MILLIGRAM(S): at 17:41

## 2023-03-16 RX ADMIN — Medication 600 MILLIGRAM(S): at 05:47

## 2023-03-16 RX ADMIN — POLYETHYLENE GLYCOL 3350 17 GRAM(S): 17 POWDER, FOR SOLUTION ORAL at 20:23

## 2023-03-16 RX ADMIN — Medication 0.5 MILLIGRAM(S): at 12:25

## 2023-03-16 RX ADMIN — ALBUTEROL 2.5 MILLIGRAM(S): 90 AEROSOL, METERED ORAL at 20:12

## 2023-03-16 RX ADMIN — CELECOXIB 200 MILLIGRAM(S): 200 CAPSULE ORAL at 12:26

## 2023-03-16 RX ADMIN — HYDROMORPHONE HYDROCHLORIDE 4 MILLIGRAM(S): 2 INJECTION INTRAMUSCULAR; INTRAVENOUS; SUBCUTANEOUS at 12:26

## 2023-03-16 RX ADMIN — ALBUTEROL 2.5 MILLIGRAM(S): 90 AEROSOL, METERED ORAL at 02:03

## 2023-03-16 RX ADMIN — Medication 1 GRAM(S): at 17:41

## 2023-03-16 RX ADMIN — ONDANSETRON 4 MILLIGRAM(S): 8 TABLET, FILM COATED ORAL at 17:42

## 2023-03-16 RX ADMIN — ENOXAPARIN SODIUM 40 MILLIGRAM(S): 100 INJECTION SUBCUTANEOUS at 02:06

## 2023-03-17 LAB
ANION GAP SERPL CALC-SCNC: 5 MMOL/L — SIGNIFICANT CHANGE UP (ref 5–17)
BUN SERPL-MCNC: 21 MG/DL — SIGNIFICANT CHANGE UP (ref 7–23)
CALCIUM SERPL-MCNC: 9.2 MG/DL — SIGNIFICANT CHANGE UP (ref 8.5–10.1)
CHLORIDE SERPL-SCNC: 110 MMOL/L — HIGH (ref 96–108)
CO2 SERPL-SCNC: 28 MMOL/L — SIGNIFICANT CHANGE UP (ref 22–31)
CREAT SERPL-MCNC: 0.74 MG/DL — SIGNIFICANT CHANGE UP (ref 0.5–1.3)
EGFR: 93 ML/MIN/1.73M2 — SIGNIFICANT CHANGE UP
GLUCOSE SERPL-MCNC: 112 MG/DL — HIGH (ref 70–99)
HCT VFR BLD CALC: 33.8 % — LOW (ref 34.5–45)
HGB BLD-MCNC: 10.7 G/DL — LOW (ref 11.5–15.5)
MAGNESIUM SERPL-MCNC: 2.2 MG/DL — SIGNIFICANT CHANGE UP (ref 1.6–2.6)
MCHC RBC-ENTMCNC: 30 PG — SIGNIFICANT CHANGE UP (ref 27–34)
MCHC RBC-ENTMCNC: 31.7 GM/DL — LOW (ref 32–36)
MCV RBC AUTO: 94.7 FL — SIGNIFICANT CHANGE UP (ref 80–100)
NRBC # BLD: 0 /100 WBCS — SIGNIFICANT CHANGE UP (ref 0–0)
PLATELET # BLD AUTO: 250 K/UL — SIGNIFICANT CHANGE UP (ref 150–400)
POTASSIUM SERPL-MCNC: 3.4 MMOL/L — LOW (ref 3.5–5.3)
POTASSIUM SERPL-SCNC: 3.4 MMOL/L — LOW (ref 3.5–5.3)
RBC # BLD: 3.57 M/UL — LOW (ref 3.8–5.2)
RBC # FLD: 13.2 % — SIGNIFICANT CHANGE UP (ref 10.3–14.5)
SODIUM SERPL-SCNC: 143 MMOL/L — SIGNIFICANT CHANGE UP (ref 135–145)
WBC # BLD: 9.39 K/UL — SIGNIFICANT CHANGE UP (ref 3.8–10.5)
WBC # FLD AUTO: 9.39 K/UL — SIGNIFICANT CHANGE UP (ref 3.8–10.5)

## 2023-03-17 RX ORDER — SENNA PLUS 8.6 MG/1
2 TABLET ORAL AT BEDTIME
Refills: 0 | Status: DISCONTINUED | OUTPATIENT
Start: 2023-03-17 | End: 2023-03-24

## 2023-03-17 RX ORDER — MAGNESIUM HYDROXIDE 400 MG/1
30 TABLET, CHEWABLE ORAL DAILY
Refills: 0 | Status: DISCONTINUED | OUTPATIENT
Start: 2023-03-17 | End: 2023-03-19

## 2023-03-17 RX ORDER — POLYETHYLENE GLYCOL 3350 17 G/17G
17 POWDER, FOR SOLUTION ORAL
Refills: 0 | Status: DISCONTINUED | OUTPATIENT
Start: 2023-03-17 | End: 2023-03-24

## 2023-03-17 RX ORDER — POTASSIUM CHLORIDE 20 MEQ
40 PACKET (EA) ORAL ONCE
Refills: 0 | Status: COMPLETED | OUTPATIENT
Start: 2023-03-17 | End: 2023-03-17

## 2023-03-17 RX ORDER — LOSARTAN POTASSIUM 100 MG/1
100 TABLET, FILM COATED ORAL DAILY
Refills: 0 | Status: DISCONTINUED | OUTPATIENT
Start: 2023-03-17 | End: 2023-03-24

## 2023-03-17 RX ADMIN — MEMANTINE HYDROCHLORIDE 10 MILLIGRAM(S): 10 TABLET ORAL at 17:52

## 2023-03-17 RX ADMIN — PANTOPRAZOLE SODIUM 40 MILLIGRAM(S): 20 TABLET, DELAYED RELEASE ORAL at 06:17

## 2023-03-17 RX ADMIN — Medication 1 GRAM(S): at 05:40

## 2023-03-17 RX ADMIN — Medication 1 GRAM(S): at 11:47

## 2023-03-17 RX ADMIN — Medication 650 MILLIGRAM(S): at 22:48

## 2023-03-17 RX ADMIN — ALBUTEROL 2.5 MILLIGRAM(S): 90 AEROSOL, METERED ORAL at 07:46

## 2023-03-17 RX ADMIN — Medication 600 MILLIGRAM(S): at 17:52

## 2023-03-17 RX ADMIN — Medication 50 MILLIGRAM(S): at 17:52

## 2023-03-17 RX ADMIN — Medication 50 MILLIGRAM(S): at 11:57

## 2023-03-17 RX ADMIN — MODAFINIL 400 MILLIGRAM(S): 200 TABLET ORAL at 11:47

## 2023-03-17 RX ADMIN — Medication 0.5 MILLIGRAM(S): at 11:57

## 2023-03-17 RX ADMIN — Medication 1 GRAM(S): at 23:42

## 2023-03-17 RX ADMIN — SENNA PLUS 2 TABLET(S): 8.6 TABLET ORAL at 21:48

## 2023-03-17 RX ADMIN — Medication 100 MILLIGRAM(S): at 14:55

## 2023-03-17 RX ADMIN — Medication 650 MILLIGRAM(S): at 05:40

## 2023-03-17 RX ADMIN — ONDANSETRON 4 MILLIGRAM(S): 8 TABLET, FILM COATED ORAL at 14:55

## 2023-03-17 RX ADMIN — HYDROMORPHONE HYDROCHLORIDE 4 MILLIGRAM(S): 2 INJECTION INTRAMUSCULAR; INTRAVENOUS; SUBCUTANEOUS at 11:47

## 2023-03-17 RX ADMIN — Medication 100 MILLIGRAM(S): at 05:39

## 2023-03-17 RX ADMIN — ONDANSETRON 4 MILLIGRAM(S): 8 TABLET, FILM COATED ORAL at 21:48

## 2023-03-17 RX ADMIN — ALBUTEROL 2.5 MILLIGRAM(S): 90 AEROSOL, METERED ORAL at 13:43

## 2023-03-17 RX ADMIN — Medication 100 MILLIGRAM(S): at 16:28

## 2023-03-17 RX ADMIN — CELECOXIB 200 MILLIGRAM(S): 200 CAPSULE ORAL at 12:57

## 2023-03-17 RX ADMIN — ONDANSETRON 4 MILLIGRAM(S): 8 TABLET, FILM COATED ORAL at 04:36

## 2023-03-17 RX ADMIN — Medication 600 MILLIGRAM(S): at 05:39

## 2023-03-17 RX ADMIN — Medication 650 MILLIGRAM(S): at 21:48

## 2023-03-17 RX ADMIN — Medication 50 MILLIGRAM(S): at 01:35

## 2023-03-17 RX ADMIN — Medication 1 GRAM(S): at 17:52

## 2023-03-17 RX ADMIN — ENOXAPARIN SODIUM 40 MILLIGRAM(S): 100 INJECTION SUBCUTANEOUS at 01:36

## 2023-03-17 RX ADMIN — Medication 650 MILLIGRAM(S): at 06:40

## 2023-03-17 RX ADMIN — Medication 225 MICROGRAM(S): at 05:39

## 2023-03-17 RX ADMIN — MEMANTINE HYDROCHLORIDE 10 MILLIGRAM(S): 10 TABLET ORAL at 05:43

## 2023-03-17 RX ADMIN — Medication 100 MILLIGRAM(S): at 21:48

## 2023-03-17 RX ADMIN — Medication 40 MILLIEQUIVALENT(S): at 17:57

## 2023-03-17 RX ADMIN — CELECOXIB 200 MILLIGRAM(S): 200 CAPSULE ORAL at 11:57

## 2023-03-17 RX ADMIN — HYDROMORPHONE HYDROCHLORIDE 4 MILLIGRAM(S): 2 INJECTION INTRAMUSCULAR; INTRAVENOUS; SUBCUTANEOUS at 12:47

## 2023-03-17 RX ADMIN — ALBUTEROL 2.5 MILLIGRAM(S): 90 AEROSOL, METERED ORAL at 00:22

## 2023-03-17 RX ADMIN — ALBUTEROL 2.5 MILLIGRAM(S): 90 AEROSOL, METERED ORAL at 20:14

## 2023-03-17 RX ADMIN — METFORMIN HYDROCHLORIDE 500 MILLIGRAM(S): 850 TABLET ORAL at 23:42

## 2023-03-17 RX ADMIN — POLYETHYLENE GLYCOL 3350 17 GRAM(S): 17 POWDER, FOR SOLUTION ORAL at 21:50

## 2023-03-17 RX ADMIN — ATORVASTATIN CALCIUM 40 MILLIGRAM(S): 80 TABLET, FILM COATED ORAL at 21:50

## 2023-03-18 RX ORDER — LABETALOL HCL 100 MG
400 TABLET ORAL
Refills: 0 | Status: DISCONTINUED | OUTPATIENT
Start: 2023-03-18 | End: 2023-03-24

## 2023-03-18 RX ORDER — AMLODIPINE BESYLATE 2.5 MG/1
10 TABLET ORAL
Refills: 0 | Status: DISCONTINUED | OUTPATIENT
Start: 2023-03-18 | End: 2023-03-24

## 2023-03-18 RX ADMIN — HYDROMORPHONE HYDROCHLORIDE 4 MILLIGRAM(S): 2 INJECTION INTRAMUSCULAR; INTRAVENOUS; SUBCUTANEOUS at 12:05

## 2023-03-18 RX ADMIN — Medication 225 MICROGRAM(S): at 05:42

## 2023-03-18 RX ADMIN — Medication 50 MILLIGRAM(S): at 11:35

## 2023-03-18 RX ADMIN — Medication 100 MILLIGRAM(S): at 11:38

## 2023-03-18 RX ADMIN — MEMANTINE HYDROCHLORIDE 10 MILLIGRAM(S): 10 TABLET ORAL at 18:05

## 2023-03-18 RX ADMIN — LOSARTAN POTASSIUM 100 MILLIGRAM(S): 100 TABLET, FILM COATED ORAL at 05:46

## 2023-03-18 RX ADMIN — ALBUTEROL 2.5 MILLIGRAM(S): 90 AEROSOL, METERED ORAL at 07:47

## 2023-03-18 RX ADMIN — MAGNESIUM HYDROXIDE 30 MILLILITER(S): 400 TABLET, CHEWABLE ORAL at 21:55

## 2023-03-18 RX ADMIN — Medication 1 GRAM(S): at 11:34

## 2023-03-18 RX ADMIN — Medication 400 MILLIGRAM(S): at 19:50

## 2023-03-18 RX ADMIN — Medication 100 MILLIGRAM(S): at 21:55

## 2023-03-18 RX ADMIN — PANTOPRAZOLE SODIUM 40 MILLIGRAM(S): 20 TABLET, DELAYED RELEASE ORAL at 06:07

## 2023-03-18 RX ADMIN — Medication 50 MILLIGRAM(S): at 01:52

## 2023-03-18 RX ADMIN — CELECOXIB 200 MILLIGRAM(S): 200 CAPSULE ORAL at 12:04

## 2023-03-18 RX ADMIN — POLYETHYLENE GLYCOL 3350 17 GRAM(S): 17 POWDER, FOR SOLUTION ORAL at 05:41

## 2023-03-18 RX ADMIN — MODAFINIL 400 MILLIGRAM(S): 200 TABLET ORAL at 11:34

## 2023-03-18 RX ADMIN — Medication 50 MILLIGRAM(S): at 18:21

## 2023-03-18 RX ADMIN — Medication 600 MILLIGRAM(S): at 05:41

## 2023-03-18 RX ADMIN — HYDROMORPHONE HYDROCHLORIDE 4 MILLIGRAM(S): 2 INJECTION INTRAMUSCULAR; INTRAVENOUS; SUBCUTANEOUS at 11:35

## 2023-03-18 RX ADMIN — Medication 1 GRAM(S): at 18:05

## 2023-03-18 RX ADMIN — Medication 100 MILLIGRAM(S): at 05:41

## 2023-03-18 RX ADMIN — ALBUTEROL 2.5 MILLIGRAM(S): 90 AEROSOL, METERED ORAL at 13:44

## 2023-03-18 RX ADMIN — Medication 650 MILLIGRAM(S): at 16:31

## 2023-03-18 RX ADMIN — Medication 650 MILLIGRAM(S): at 05:54

## 2023-03-18 RX ADMIN — POLYETHYLENE GLYCOL 3350 17 GRAM(S): 17 POWDER, FOR SOLUTION ORAL at 18:05

## 2023-03-18 RX ADMIN — ALBUTEROL 2.5 MILLIGRAM(S): 90 AEROSOL, METERED ORAL at 00:57

## 2023-03-18 RX ADMIN — Medication 100 MILLIGRAM(S): at 13:58

## 2023-03-18 RX ADMIN — Medication 30 MILLILITER(S): at 14:18

## 2023-03-18 RX ADMIN — Medication 600 MILLIGRAM(S): at 18:05

## 2023-03-18 RX ADMIN — MEMANTINE HYDROCHLORIDE 10 MILLIGRAM(S): 10 TABLET ORAL at 05:46

## 2023-03-18 RX ADMIN — ENOXAPARIN SODIUM 40 MILLIGRAM(S): 100 INJECTION SUBCUTANEOUS at 01:52

## 2023-03-18 RX ADMIN — Medication 650 MILLIGRAM(S): at 06:54

## 2023-03-18 RX ADMIN — CELECOXIB 200 MILLIGRAM(S): 200 CAPSULE ORAL at 11:34

## 2023-03-18 RX ADMIN — Medication 1 GRAM(S): at 05:41

## 2023-03-18 RX ADMIN — AMLODIPINE BESYLATE 10 MILLIGRAM(S): 2.5 TABLET ORAL at 13:58

## 2023-03-18 RX ADMIN — METFORMIN HYDROCHLORIDE 500 MILLIGRAM(S): 850 TABLET ORAL at 21:52

## 2023-03-18 RX ADMIN — SENNA PLUS 2 TABLET(S): 8.6 TABLET ORAL at 21:54

## 2023-03-18 RX ADMIN — Medication 1 GRAM(S): at 23:53

## 2023-03-18 RX ADMIN — ONDANSETRON 4 MILLIGRAM(S): 8 TABLET, FILM COATED ORAL at 14:08

## 2023-03-18 RX ADMIN — ATORVASTATIN CALCIUM 40 MILLIGRAM(S): 80 TABLET, FILM COATED ORAL at 21:54

## 2023-03-18 RX ADMIN — Medication 0.5 MILLIGRAM(S): at 11:35

## 2023-03-18 RX ADMIN — Medication 100 MILLIGRAM(S): at 18:05

## 2023-03-18 RX ADMIN — ALBUTEROL 2.5 MILLIGRAM(S): 90 AEROSOL, METERED ORAL at 20:36

## 2023-03-19 RX ADMIN — MEMANTINE HYDROCHLORIDE 10 MILLIGRAM(S): 10 TABLET ORAL at 17:24

## 2023-03-19 RX ADMIN — SENNA PLUS 2 TABLET(S): 8.6 TABLET ORAL at 21:25

## 2023-03-19 RX ADMIN — Medication 50 MILLIGRAM(S): at 09:58

## 2023-03-19 RX ADMIN — ATORVASTATIN CALCIUM 40 MILLIGRAM(S): 80 TABLET, FILM COATED ORAL at 21:25

## 2023-03-19 RX ADMIN — MODAFINIL 400 MILLIGRAM(S): 200 TABLET ORAL at 12:55

## 2023-03-19 RX ADMIN — Medication 400 MILLIGRAM(S): at 08:30

## 2023-03-19 RX ADMIN — Medication 100 MILLIGRAM(S): at 21:28

## 2023-03-19 RX ADMIN — Medication 225 MICROGRAM(S): at 05:29

## 2023-03-19 RX ADMIN — ENOXAPARIN SODIUM 40 MILLIGRAM(S): 100 INJECTION SUBCUTANEOUS at 01:55

## 2023-03-19 RX ADMIN — ALBUTEROL 2.5 MILLIGRAM(S): 90 AEROSOL, METERED ORAL at 20:51

## 2023-03-19 RX ADMIN — ALBUTEROL 2.5 MILLIGRAM(S): 90 AEROSOL, METERED ORAL at 01:01

## 2023-03-19 RX ADMIN — LOSARTAN POTASSIUM 100 MILLIGRAM(S): 100 TABLET, FILM COATED ORAL at 05:49

## 2023-03-19 RX ADMIN — POLYETHYLENE GLYCOL 3350 17 GRAM(S): 17 POWDER, FOR SOLUTION ORAL at 05:51

## 2023-03-19 RX ADMIN — Medication 5 MILLIGRAM(S): at 13:13

## 2023-03-19 RX ADMIN — HYDROMORPHONE HYDROCHLORIDE 4 MILLIGRAM(S): 2 INJECTION INTRAMUSCULAR; INTRAVENOUS; SUBCUTANEOUS at 12:55

## 2023-03-19 RX ADMIN — Medication 100 MILLIGRAM(S): at 12:54

## 2023-03-19 RX ADMIN — CELECOXIB 200 MILLIGRAM(S): 200 CAPSULE ORAL at 12:54

## 2023-03-19 RX ADMIN — Medication 1 GRAM(S): at 12:54

## 2023-03-19 RX ADMIN — Medication 1 GRAM(S): at 17:23

## 2023-03-19 RX ADMIN — Medication 50 MILLIGRAM(S): at 17:24

## 2023-03-19 RX ADMIN — PANTOPRAZOLE SODIUM 40 MILLIGRAM(S): 20 TABLET, DELAYED RELEASE ORAL at 05:53

## 2023-03-19 RX ADMIN — Medication 0.5 MILLIGRAM(S): at 12:54

## 2023-03-19 RX ADMIN — MEMANTINE HYDROCHLORIDE 10 MILLIGRAM(S): 10 TABLET ORAL at 05:49

## 2023-03-19 RX ADMIN — Medication 600 MILLIGRAM(S): at 17:24

## 2023-03-19 RX ADMIN — POLYETHYLENE GLYCOL 3350 17 GRAM(S): 17 POWDER, FOR SOLUTION ORAL at 17:23

## 2023-03-19 RX ADMIN — Medication 100 MILLIGRAM(S): at 21:27

## 2023-03-19 RX ADMIN — Medication 100 MILLIGRAM(S): at 13:13

## 2023-03-19 RX ADMIN — ALBUTEROL 2.5 MILLIGRAM(S): 90 AEROSOL, METERED ORAL at 13:28

## 2023-03-19 RX ADMIN — Medication 1 GRAM(S): at 05:49

## 2023-03-19 RX ADMIN — Medication 50 MILLIGRAM(S): at 01:54

## 2023-03-19 RX ADMIN — ONDANSETRON 4 MILLIGRAM(S): 8 TABLET, FILM COATED ORAL at 21:29

## 2023-03-19 RX ADMIN — Medication 100 MILLIGRAM(S): at 05:50

## 2023-03-19 RX ADMIN — ALBUTEROL 2.5 MILLIGRAM(S): 90 AEROSOL, METERED ORAL at 07:40

## 2023-03-19 RX ADMIN — Medication 600 MILLIGRAM(S): at 05:51

## 2023-03-19 RX ADMIN — METFORMIN HYDROCHLORIDE 500 MILLIGRAM(S): 850 TABLET ORAL at 21:25

## 2023-03-19 NOTE — CASE MANAGEMENT PROGRESS NOTE - NSCMPROGRESSNOTE_GEN_ALL_CORE
CM weekend task follow up: CM spoke to Dr. Payton who stated the patient is not medically cleared for discharge today. Floor CM will remain available.

## 2023-03-20 LAB
ANION GAP SERPL CALC-SCNC: 5 MMOL/L — SIGNIFICANT CHANGE UP (ref 5–17)
BUN SERPL-MCNC: 22 MG/DL — SIGNIFICANT CHANGE UP (ref 7–23)
CALCIUM SERPL-MCNC: 9.1 MG/DL — SIGNIFICANT CHANGE UP (ref 8.5–10.1)
CHLORIDE SERPL-SCNC: 107 MMOL/L — SIGNIFICANT CHANGE UP (ref 96–108)
CO2 SERPL-SCNC: 30 MMOL/L — SIGNIFICANT CHANGE UP (ref 22–31)
CREAT SERPL-MCNC: 0.73 MG/DL — SIGNIFICANT CHANGE UP (ref 0.5–1.3)
CULTURE RESULTS: SIGNIFICANT CHANGE UP
CULTURE RESULTS: SIGNIFICANT CHANGE UP
EGFR: 94 ML/MIN/1.73M2 — SIGNIFICANT CHANGE UP
GLUCOSE SERPL-MCNC: 106 MG/DL — HIGH (ref 70–99)
HCT VFR BLD CALC: 31.6 % — LOW (ref 34.5–45)
HGB BLD-MCNC: 10 G/DL — LOW (ref 11.5–15.5)
MAGNESIUM SERPL-MCNC: 2.4 MG/DL — SIGNIFICANT CHANGE UP (ref 1.6–2.6)
MCHC RBC-ENTMCNC: 30 PG — SIGNIFICANT CHANGE UP (ref 27–34)
MCHC RBC-ENTMCNC: 31.6 GM/DL — LOW (ref 32–36)
MCV RBC AUTO: 94.9 FL — SIGNIFICANT CHANGE UP (ref 80–100)
NRBC # BLD: 0 /100 WBCS — SIGNIFICANT CHANGE UP (ref 0–0)
PLATELET # BLD AUTO: 236 K/UL — SIGNIFICANT CHANGE UP (ref 150–400)
POTASSIUM SERPL-MCNC: 3.7 MMOL/L — SIGNIFICANT CHANGE UP (ref 3.5–5.3)
POTASSIUM SERPL-SCNC: 3.7 MMOL/L — SIGNIFICANT CHANGE UP (ref 3.5–5.3)
RBC # BLD: 3.33 M/UL — LOW (ref 3.8–5.2)
RBC # FLD: 13 % — SIGNIFICANT CHANGE UP (ref 10.3–14.5)
SODIUM SERPL-SCNC: 142 MMOL/L — SIGNIFICANT CHANGE UP (ref 135–145)
SPECIMEN SOURCE: SIGNIFICANT CHANGE UP
SPECIMEN SOURCE: SIGNIFICANT CHANGE UP
WBC # BLD: 13.73 K/UL — HIGH (ref 3.8–10.5)
WBC # FLD AUTO: 13.73 K/UL — HIGH (ref 3.8–10.5)

## 2023-03-20 RX ADMIN — ALBUTEROL 2.5 MILLIGRAM(S): 90 AEROSOL, METERED ORAL at 01:18

## 2023-03-20 RX ADMIN — Medication 5 MILLIGRAM(S): at 14:00

## 2023-03-20 RX ADMIN — Medication 50 MILLIGRAM(S): at 09:45

## 2023-03-20 RX ADMIN — ALBUTEROL 2.5 MILLIGRAM(S): 90 AEROSOL, METERED ORAL at 20:14

## 2023-03-20 RX ADMIN — Medication 100 MILLIGRAM(S): at 12:54

## 2023-03-20 RX ADMIN — ATORVASTATIN CALCIUM 40 MILLIGRAM(S): 80 TABLET, FILM COATED ORAL at 21:09

## 2023-03-20 RX ADMIN — Medication 100 MILLIGRAM(S): at 06:25

## 2023-03-20 RX ADMIN — POLYETHYLENE GLYCOL 3350 17 GRAM(S): 17 POWDER, FOR SOLUTION ORAL at 06:25

## 2023-03-20 RX ADMIN — Medication 50 MILLIGRAM(S): at 17:30

## 2023-03-20 RX ADMIN — ALBUTEROL 2.5 MILLIGRAM(S): 90 AEROSOL, METERED ORAL at 13:31

## 2023-03-20 RX ADMIN — POLYETHYLENE GLYCOL 3350 17 GRAM(S): 17 POWDER, FOR SOLUTION ORAL at 17:30

## 2023-03-20 RX ADMIN — Medication 400 MILLIGRAM(S): at 20:53

## 2023-03-20 RX ADMIN — Medication 0.5 MILLIGRAM(S): at 11:57

## 2023-03-20 RX ADMIN — ONDANSETRON 4 MILLIGRAM(S): 8 TABLET, FILM COATED ORAL at 21:10

## 2023-03-20 RX ADMIN — MEMANTINE HYDROCHLORIDE 10 MILLIGRAM(S): 10 TABLET ORAL at 17:29

## 2023-03-20 RX ADMIN — ENOXAPARIN SODIUM 40 MILLIGRAM(S): 100 INJECTION SUBCUTANEOUS at 01:56

## 2023-03-20 RX ADMIN — Medication 600 MILLIGRAM(S): at 17:29

## 2023-03-20 RX ADMIN — METFORMIN HYDROCHLORIDE 500 MILLIGRAM(S): 850 TABLET ORAL at 21:12

## 2023-03-20 RX ADMIN — Medication 1 GRAM(S): at 06:25

## 2023-03-20 RX ADMIN — Medication 5 MILLIGRAM(S): at 01:57

## 2023-03-20 RX ADMIN — AMLODIPINE BESYLATE 10 MILLIGRAM(S): 2.5 TABLET ORAL at 13:11

## 2023-03-20 RX ADMIN — LOSARTAN POTASSIUM 100 MILLIGRAM(S): 100 TABLET, FILM COATED ORAL at 06:25

## 2023-03-20 RX ADMIN — Medication 600 MILLIGRAM(S): at 06:25

## 2023-03-20 RX ADMIN — Medication 225 MICROGRAM(S): at 06:22

## 2023-03-20 RX ADMIN — Medication 100 MILLIGRAM(S): at 13:40

## 2023-03-20 RX ADMIN — HYDROMORPHONE HYDROCHLORIDE 4 MILLIGRAM(S): 2 INJECTION INTRAMUSCULAR; INTRAVENOUS; SUBCUTANEOUS at 12:57

## 2023-03-20 RX ADMIN — Medication 50 MILLIGRAM(S): at 01:56

## 2023-03-20 RX ADMIN — Medication 1 GRAM(S): at 00:04

## 2023-03-20 RX ADMIN — Medication 400 MILLIGRAM(S): at 08:57

## 2023-03-20 RX ADMIN — Medication 100 MILLIGRAM(S): at 21:12

## 2023-03-20 RX ADMIN — ALBUTEROL 2.5 MILLIGRAM(S): 90 AEROSOL, METERED ORAL at 08:56

## 2023-03-20 RX ADMIN — CELECOXIB 200 MILLIGRAM(S): 200 CAPSULE ORAL at 12:02

## 2023-03-20 RX ADMIN — MEMANTINE HYDROCHLORIDE 10 MILLIGRAM(S): 10 TABLET ORAL at 06:23

## 2023-03-20 RX ADMIN — CELECOXIB 200 MILLIGRAM(S): 200 CAPSULE ORAL at 13:02

## 2023-03-20 RX ADMIN — PANTOPRAZOLE SODIUM 40 MILLIGRAM(S): 20 TABLET, DELAYED RELEASE ORAL at 06:24

## 2023-03-20 RX ADMIN — ONDANSETRON 4 MILLIGRAM(S): 8 TABLET, FILM COATED ORAL at 06:26

## 2023-03-20 RX ADMIN — Medication 1 GRAM(S): at 17:29

## 2023-03-20 RX ADMIN — Medication 100 MILLIGRAM(S): at 21:10

## 2023-03-20 RX ADMIN — MODAFINIL 400 MILLIGRAM(S): 200 TABLET ORAL at 11:57

## 2023-03-20 RX ADMIN — SENNA PLUS 2 TABLET(S): 8.6 TABLET ORAL at 21:10

## 2023-03-20 RX ADMIN — Medication 1 GRAM(S): at 11:57

## 2023-03-20 RX ADMIN — HYDROMORPHONE HYDROCHLORIDE 4 MILLIGRAM(S): 2 INJECTION INTRAMUSCULAR; INTRAVENOUS; SUBCUTANEOUS at 11:57

## 2023-03-20 NOTE — PHYSICAL THERAPY INITIAL EVALUATION ADULT - PERTINENT HX OF CURRENT PROBLEM, REHAB EVAL
Patient is a 62-year-old female with past medical history of YULIA cervical radiculopathy Cushing syndrome diabetes fibromyalgia gastroparesis GERD hypothyroidism on kidney stones migraines mitral valve prolapse neurogenic bladder osteoarthritis Raynaud's coming in complaining of fever for the last few days.  Patient states cough and sore throat started yesterday.  Patient denies any abdominal pain nausea vomiting diarrhea chest pain or shortness of breath.  Patient went to urgent care flu and COVID done advised to come to emergency room due to low oxygen and rapid heart rate.  Test result pending.  Patient denies any recent travels leg swelling or sick contacts.  Patient vaccinated for COVID and flu. Patient received several rounds of nebs and solumedrol 125mg ivp x 1 without much improvement. She continued to desaturate when taken off the oxygen

## 2023-03-20 NOTE — PHYSICAL THERAPY INITIAL EVALUATION ADULT - ADDITIONAL COMMENTS
Patient lives in private home, +4 DARSHAN then resides on main floor. Patient was independent in all ADLs and ambulated independently with SAC.

## 2023-03-21 ENCOUNTER — TRANSCRIPTION ENCOUNTER (OUTPATIENT)
Age: 63
End: 2023-03-21

## 2023-03-21 RX ORDER — LEVOTHYROXINE SODIUM 125 MCG
3 TABLET ORAL
Qty: 0 | Refills: 0 | DISCHARGE
Start: 2023-03-21

## 2023-03-21 RX ORDER — ALBUTEROL 90 UG/1
3 AEROSOL, METERED ORAL
Qty: 100 | Refills: 0
Start: 2023-03-21

## 2023-03-21 RX ORDER — LEVOTHYROXINE SODIUM 125 MCG
224 TABLET ORAL
Qty: 0 | Refills: 0 | DISCHARGE

## 2023-03-21 RX ADMIN — MEMANTINE HYDROCHLORIDE 10 MILLIGRAM(S): 10 TABLET ORAL at 05:30

## 2023-03-21 RX ADMIN — PANTOPRAZOLE SODIUM 40 MILLIGRAM(S): 20 TABLET, DELAYED RELEASE ORAL at 05:30

## 2023-03-21 RX ADMIN — Medication 650 MILLIGRAM(S): at 18:59

## 2023-03-21 RX ADMIN — MEMANTINE HYDROCHLORIDE 10 MILLIGRAM(S): 10 TABLET ORAL at 17:48

## 2023-03-21 RX ADMIN — Medication 100 MILLIGRAM(S): at 17:47

## 2023-03-21 RX ADMIN — Medication 1 GRAM(S): at 00:46

## 2023-03-21 RX ADMIN — HYDROMORPHONE HYDROCHLORIDE 4 MILLIGRAM(S): 2 INJECTION INTRAMUSCULAR; INTRAVENOUS; SUBCUTANEOUS at 11:19

## 2023-03-21 RX ADMIN — Medication 400 MILLIGRAM(S): at 20:52

## 2023-03-21 RX ADMIN — SENNA PLUS 2 TABLET(S): 8.6 TABLET ORAL at 21:34

## 2023-03-21 RX ADMIN — HYDROMORPHONE HYDROCHLORIDE 4 MILLIGRAM(S): 2 INJECTION INTRAMUSCULAR; INTRAVENOUS; SUBCUTANEOUS at 12:19

## 2023-03-21 RX ADMIN — ALBUTEROL 2.5 MILLIGRAM(S): 90 AEROSOL, METERED ORAL at 00:56

## 2023-03-21 RX ADMIN — Medication 100 MILLIGRAM(S): at 05:29

## 2023-03-21 RX ADMIN — POLYETHYLENE GLYCOL 3350 17 GRAM(S): 17 POWDER, FOR SOLUTION ORAL at 17:49

## 2023-03-21 RX ADMIN — Medication 600 MILLIGRAM(S): at 05:30

## 2023-03-21 RX ADMIN — Medication 100 MILLIGRAM(S): at 21:33

## 2023-03-21 RX ADMIN — ENOXAPARIN SODIUM 40 MILLIGRAM(S): 100 INJECTION SUBCUTANEOUS at 01:31

## 2023-03-21 RX ADMIN — Medication 0.5 MILLIGRAM(S): at 11:19

## 2023-03-21 RX ADMIN — Medication 50 MILLIGRAM(S): at 01:32

## 2023-03-21 RX ADMIN — POLYETHYLENE GLYCOL 3350 17 GRAM(S): 17 POWDER, FOR SOLUTION ORAL at 05:29

## 2023-03-21 RX ADMIN — ALBUTEROL 2.5 MILLIGRAM(S): 90 AEROSOL, METERED ORAL at 07:34

## 2023-03-21 RX ADMIN — CELECOXIB 200 MILLIGRAM(S): 200 CAPSULE ORAL at 12:18

## 2023-03-21 RX ADMIN — Medication 400 MILLIGRAM(S): at 08:24

## 2023-03-21 RX ADMIN — Medication 50 MILLIGRAM(S): at 10:45

## 2023-03-21 RX ADMIN — Medication 1 GRAM(S): at 05:29

## 2023-03-21 RX ADMIN — Medication 600 MILLIGRAM(S): at 17:53

## 2023-03-21 RX ADMIN — ALBUTEROL 2.5 MILLIGRAM(S): 90 AEROSOL, METERED ORAL at 13:47

## 2023-03-21 RX ADMIN — CELECOXIB 200 MILLIGRAM(S): 200 CAPSULE ORAL at 11:18

## 2023-03-21 RX ADMIN — ALBUTEROL 2.5 MILLIGRAM(S): 90 AEROSOL, METERED ORAL at 20:28

## 2023-03-21 RX ADMIN — Medication 225 MICROGRAM(S): at 05:30

## 2023-03-21 RX ADMIN — LOSARTAN POTASSIUM 100 MILLIGRAM(S): 100 TABLET, FILM COATED ORAL at 05:30

## 2023-03-21 RX ADMIN — MODAFINIL 400 MILLIGRAM(S): 200 TABLET ORAL at 11:19

## 2023-03-21 RX ADMIN — Medication 1 GRAM(S): at 17:48

## 2023-03-21 RX ADMIN — Medication 100 MILLIGRAM(S): at 13:12

## 2023-03-21 RX ADMIN — Medication 50 MILLIGRAM(S): at 17:48

## 2023-03-21 RX ADMIN — Medication 5 MILLIGRAM(S): at 17:53

## 2023-03-21 RX ADMIN — ATORVASTATIN CALCIUM 40 MILLIGRAM(S): 80 TABLET, FILM COATED ORAL at 21:33

## 2023-03-21 RX ADMIN — Medication 1 GRAM(S): at 11:19

## 2023-03-21 RX ADMIN — METFORMIN HYDROCHLORIDE 500 MILLIGRAM(S): 850 TABLET ORAL at 21:34

## 2023-03-21 RX ADMIN — Medication 650 MILLIGRAM(S): at 19:15

## 2023-03-21 NOTE — DIETITIAN INITIAL EVALUATION ADULT - NSICDXPASTMEDICALHX_GEN_ALL_CORE_FT
PAST MEDICAL HISTORY:  Acute kidney injury     Cervical radiculopathy     Cushing's syndrome     Diabetes     Fibromyalgia     Gastroparesis     GERD (gastroesophageal reflux disease)     Hypothyroidism     Kidney stone left    Migraine     MVP (mitral valve prolapse)     Neurogenic bladder     Osteoarthritis     Raynaud's disease     Reflex sympathetic dystrophy     Thyroiditis hashimottos    TMJ (dislocation of temporomandibular joint)

## 2023-03-21 NOTE — DIETITIAN INITIAL EVALUATION ADULT - PERTINENT MEDS FT
MEDICATIONS  (STANDING):  albuterol    0.083% 2.5 milliGRAM(s) Nebulizer every 6 hours  amLODIPine   Tablet 10 milliGRAM(s) Oral <User Schedule>  atorvastatin 40 milliGRAM(s) Oral at bedtime  celecoxib 200 milliGRAM(s) Oral daily  clonazePAM  Tablet 0.5 milliGRAM(s) Oral daily  dextrose 5%. 1000 milliLiter(s) (100 mL/Hr) IV Continuous <Continuous>  dextrose 5%. 1000 milliLiter(s) (50 mL/Hr) IV Continuous <Continuous>  dextrose 50% Injectable 25 Gram(s) IV Push once  dextrose 50% Injectable 12.5 Gram(s) IV Push once  dextrose 50% Injectable 25 Gram(s) IV Push once  enoxaparin Injectable 40 milliGRAM(s) SubCutaneous every 24 hours  glucagon  Injectable 1 milliGRAM(s) IntraMuscular once  guaiFENesin  milliGRAM(s) Oral every 12 hours  hydrocortisone sodium succinate Injectable 50 milliGRAM(s) IV Push every 8 hours  HYDROmorphone   Tablet 4 milliGRAM(s) Oral daily  insulin lispro (ADMELOG) corrective regimen sliding scale   SubCutaneous three times a day before meals  insulin lispro (ADMELOG) corrective regimen sliding scale   SubCutaneous at bedtime  labetalol 400 milliGRAM(s) Oral <User Schedule>  levothyroxine 225 MICROGram(s) Oral daily  losartan 100 milliGRAM(s) Oral daily  memantine 10 milliGRAM(s) Oral two times a day  metFORMIN 500 milliGRAM(s) Oral at bedtime  modafinil 400 milliGRAM(s) Oral daily  pantoprazole    Tablet 40 milliGRAM(s) Oral before breakfast  polyethylene glycol 3350 17 Gram(s) Oral two times a day  pregabalin 100 milliGRAM(s) Oral three times a day  senna 2 Tablet(s) Oral at bedtime  sucralfate 1 Gram(s) Oral four times a day    MEDICATIONS  (PRN):  acetaminophen     Tablet .. 650 milliGRAM(s) Oral every 6 hours PRN Temp greater or equal to 38C (100.4F), Mild Pain (1 - 3)  aluminum hydroxide/magnesium hydroxide/simethicone Suspension 30 milliLiter(s) Oral every 4 hours PRN Dyspepsia  bisacodyl 5 milliGRAM(s) Oral every 12 hours PRN Constipation  dextrose Oral Gel 15 Gram(s) Oral once PRN Blood Glucose LESS THAN 70 milliGRAM(s)/deciliter  guaiFENesin Oral Liquid (Sugar-Free) 100 milliGRAM(s) Oral every 6 hours PRN Cough  melatonin 3 milliGRAM(s) Oral at bedtime PRN Insomnia  ondansetron Injectable 4 milliGRAM(s) IV Push every 6 hours PRN Nausea and/or Vomiting

## 2023-03-21 NOTE — PATIENT CHOICE NOTE. - NSPTCHOICESTATE_GEN_ALL_CORE

## 2023-03-21 NOTE — DISCHARGE NOTE NURSING/CASE MANAGEMENT/SOCIAL WORK - NSTOBACCONEVERSMOKERY/N_GEN_A
"  Thursday, January 18th, 2018     Study: "A Phase 3, randomized, multicenter, double-blind, placebo-controlled, 2-arm, efficacy and safety study of SBXV407 plus standard of care versus placebo plus standard of care in subjects with light chain (AL) amyloidosis"  Protocol ID# [id number]  IRB# 2015.305.A  Sponsor: Eliseo  Investigator: Dr. Atkins    Month 13, Day 1   [id number]/Placebo Infusion    Patient presents for Month 13, Day 1 MD visit ahead of Month 13 study drug infusion per above-mentioned protocol.  Patient is awake, alert, and oriented to person, place, and time.  He is accompanied by his wife.  He states his quality of life remains good and has little to no dyspnea issues.   He is now taking bumex about every 4 days or when his weight goes up by a few pounds; he also notices some slight shortness of breath at around day 4 of going without bumex. Reports taking Mag/Potassium supplements on days of Bumex as per Dr. Carmichael' recommendations.  BP's stable, has BP of 119/78 in clinic today.  Patient verbalizes continued willingness to participate in above-mentioned trial at this time.      Review of AE's:     Restless Leg Syndrome, grade 1:  No reports of this today. Taking nightly gabapentin, which helps him rest. Patient states he intermittently takes vitamin supplements as per med list.  AE ongoing.  Total Bilirubin Increased, Grade 1:  Total bilirubin 1.6 per local blood work performed today.  This continues to be grade 1.   NCS per Dr. Atkins and does not affect dosing of further therapy. AE grade 1, will follow.  Dry Skin, grade 1:  No complaints of worsening symptoms.  AE ongoing.  Peripheral sensory neuropathy, grade 1: States this is ongoing but mostly unchanged from that of previous reports.  He states he takes the gabapentin once at night and this helps with the next day's neuropathy.  Sharp/shooting pains a couple of times a day, but is tolerable.   AE, grade 1 ongoing.  Insomnia, Grade 2:  No " "current issues, states he believes the gabapentin is helping with this.  Denies having needed the Rozarem "for a while now."  States he notices his snoring increases when he has not taken the Bumex in a while, wife agrees with this.  AE improved, now grade 1.       Physical performed per Dr. Atkins, see MD note for Complete PE, symptom-directed PE, ECOG PS and NYHA Classification.  24 hour urine remitted and processed at this timepoint per above-mentioned protocol.  See flowsheets for height, weight, con meds and vital signs.  NIS-LL completed, see chart for source documentation. Patient completed all QOL's (including VASPI) per Emiliana MD visit.  Patient is not WOCBP.  Local blood work performed and reviewed today per Dr. Atkins.  [facility], including PK, ADA, and Archive samples from months 10, 11, and 12.   Concomitant medications reviewed.  Patient confirms that per Dr. Atkins, he continues on acyclovir.  Per Dr. Atkins, patient is approved to initiate M13,D1 treatment today.  Treatment plan updated with today's weight (86.9 kg) and signed per Dr. Atkins.  Benadryl adjusted to PO due to previous experience with RLS.  Velcade injection to remain at q6 weeks per Dr. Atkins, not due at this month's visit.    6MWT not required at this timepoint.      Study-mandated procedures performed per protocol as follows:     -PREDOSE STUDY LABS TO BE DRAWN WITH OTHER BLOOD WORK:   Time: __0815____  -PREMEDS TO BE GIVEN W/IN 30-90 MINUTES PRIOR TO START OF INFUSION   Time:___1014____  - PREDOSE COMPLETE VITAL SIGNS AFTER PREMEDS BUT W/IN 30 MIN PRIOR TO INFUSION   Time:____1048_____ Vitals:__128/63, 78, 18, 98.2__  -EKG TO BE PERFORMED BY STUDY RN WITHIN 30 MINUTES OF INFUSION   Time:___1040___  -CHEMO ADMINISTERED OVER 60 +/- 10 MINUTES (275 ML/HR); please document the saline flush.   Start Time:__1050__  Stop Time: __1150___  -COMPLETE VITAL SIGNS AT END OF INFUSION (which includes " completion of the 30 mL saline flush) (+10min window allowed)   Time:__1150_   Vitals: __133/70, 82, 18, 98.5___  -COMPLETE VITAL SIGNS AT 1 HOUR POST INFUSION (+/- 10min window allowed)   Time:__1250__  Vitals:__119/68, 82, 18, 98.5 _  -OBSERVE PATIENT FOR 90 MINUTES (+/- 10 minutes) AFTER STUDY DRUG COMPLETION   Observation period end time: __1330__  *No Velcade at this visit    Patient tolerated the above with no difficulties, see infusion RN documentation.       All of patient's questions were answered to his satisfaction.  Next dose Velcade will be due with next month's study drug infusion.  Cycle 14 appts already for 2/20/18 scheduled and discussed at length with patient who states understanding.  One year ECHO scheduled ahead of Dr. Lao visit on 2/5/18.  Patient states understanding of plan of care and upcoming schedule.  He states having [facility] RN contact information as well as that of BMT clinic.                    No

## 2023-03-21 NOTE — DISCHARGE NOTE NURSING/CASE MANAGEMENT/SOCIAL WORK - PATIENT PORTAL LINK FT
You can access the FollowMyHealth Patient Portal offered by [facility] by registering at the following website: [email or web address]. By joining [employer]’s FollowMyHealth portal, you will also be able to view your health information using other applications (apps) compatible with our system.

## 2023-03-21 NOTE — DIETITIAN INITIAL EVALUATION ADULT - ORAL INTAKE PTA/DIET HISTORY
patient seen finishing up with PT.  patient making selections from menu. states eats small frequent meals at home with gastroparesis.  No BM since one week ago Saturday on bowel regiment prune juice taken warmed this admit,. reports greek yogurt ok. Lactose intolerance noted.   patient reports weight stable at this point while on steroids over last summer gained weight up to 180# now lost 30#. reports has full upper dentures waiting for bottom denture repairs. also follow low Na diet with recent dx HTN. refusing education at this time follows NCS ALEX diet at home.   checks blood sugars once day in AM. on metformin PTA . HgbA1c 5.6%  patient reports past history early 2000's on TPN

## 2023-03-21 NOTE — DISCHARGE NOTE PROVIDER - NSDCMRMEDTOKEN_GEN_ALL_CORE_FT
albuterol 2.5 mg/3 mL (0.083%) inhalation solution: 3 milliliter(s) inhaled 3 times a day, As Needed -for shortness of breath and/or wheezing   amLODIPine 10 mg oral tablet: 1 tab(s) orally once a day in the afternoon  Carafate 1 g oral tablet: 1 tab(s) orally 4 times a day (before meals and at bedtime)  CeleBREX 200 mg oral capsule: 1 cap(s) orally once a day  Dexilant 60 mg oral delayed release capsule: 1 cap(s) orally once a day  Dilaudid 4 mg oral tablet: 1 tab(s) orally once a day  Duragesic-25 transdermal film, extended release: 1 film(s) transdermal every 48 hours  guaiFENesin 600 mg oral tablet, extended release: 1 tab(s) orally every 12 hours, As Needed -for cough   hydrocortisone 10 mg oral tablet: 1 tab(s) orally once a day  hydrocortisone 5 mg oral tablet: 1 tab(s) orally once a day -- afternoon  KlonoPIN 0.5 mg oral tablet: 1 tab(s) orally once a day  labetalol 200 mg oral tablet: 2 tab(s) orally every 12 hours  levothyroxine 75 mcg (0.075 mg) oral tablet: 3 tab(s) orally once a day  Linzess 145 mcg oral capsule: 1 cap(s) orally once a day  Lyrica 100 mg oral capsule: 1 cap(s) orally 3 times a day  metFORMIN 500 mg oral tablet: 1 tab(s) orally once a day (at bedtime)  Motegrity 1 mg oral tablet: 1 tab(s) orally once a day  Namenda XR:   olmesartan 40 mg oral tablet: 1 tab(s) orally once a day  Prevacid 30 mg oral delayed release capsule: 1 cap(s) orally once a day  Prolia 60 mg/mL subcutaneous solution:   Provigil 200 mg oral tablet: 2 tab(s) orally once a day (in the morning)  rosuvastatin 10 mg oral tablet: 1 tab(s) orally once a day  Sancuso 3.1 mg/24 hr transdermal film, extended release: 1 patch transdermal once a week  Zofran 4 mg oral tablet: 1 tab(s) orally every 8 hours, As Needed  Zomig 2.5 mg nasal spray:    albuterol 2.5 mg/3 mL (0.083%) inhalation solution: 3 milliliter(s) inhaled 3 times a day, As Needed -for shortness of breath and/or wheezing   amLODIPine 10 mg oral tablet: 1 tab(s) orally once a day in the afternoon  Carafate 1 g oral tablet: 1 tab(s) orally 4 times a day (before meals and at bedtime)  CeleBREX 200 mg oral capsule: 1 cap(s) orally once a day  Dexilant 60 mg oral delayed release capsule: 1 cap(s) orally once a day  Dilaudid 4 mg oral tablet: 1 tab(s) orally once a day  Duragesic-25 transdermal film, extended release: 1 film(s) transdermal every 48 hours  guaiFENesin 600 mg oral tablet, extended release: 1 tab(s) orally every 12 hours, As Needed -for cough   hydrocortisone 10 mg oral tablet: 1 tab(s) orally once a day  hydrocortisone 5 mg oral tablet: 1 tab(s) orally once a day -- afternoon  KlonoPIN 0.5 mg oral tablet: 1 tab(s) orally once a day  labetalol 200 mg oral tablet: 2 tab(s) orally every 12 hours  levothyroxine 75 mcg (0.075 mg) oral tablet: 3 tab(s) orally once a day  Linzess 145 mcg oral capsule: 1 cap(s) orally once a day  Lyrica 100 mg oral capsule: 1 cap(s) orally 3 times a day  metFORMIN 500 mg oral tablet: 1 tab(s) orally once a day (at bedtime)  Motegrity 1 mg oral tablet: 1 tab(s) orally once a day  Namenda XR:   olmesartan 40 mg oral tablet: 1 tab(s) orally once a day  pregabalin 100 mg oral capsule: 1 cap(s) orally 3 times a day  Prevacid 30 mg oral delayed release capsule: 1 cap(s) orally once a day  Prolia 60 mg/mL subcutaneous solution:   Provigil 200 mg oral tablet: 2 tab(s) orally once a day (in the morning)  rosuvastatin 10 mg oral tablet: 1 tab(s) orally once a day  Sancuso 3.1 mg/24 hr transdermal film, extended release: 1 patch transdermal once a week  Zofran 4 mg oral tablet: 1 tab(s) orally every 8 hours, As Needed  Zomig 2.5 mg nasal spray:

## 2023-03-21 NOTE — DIETITIAN INITIAL EVALUATION ADULT - OTHER INFO
Reason for Admission: flu-like symptoms  History of Present Illness:   Patient is a 62-year-old female with past medical history of YULIA cervical radiculopathy Cushing syndrome diabetes fibromyalgia gastroparesis GERD hypothyroidism on kidney stones migraines mitral valve prolapse neurogenic bladder osteoarthritis Raynaud's coming in complaining of fever for the last few days.  Patient states cough and sore throat started yesterday.  Patient denies any abdominal pain nausea vomiting diarrhea chest pain or shortness of breath.  Patient went to urgent care flu and COVID done advised to come to emergency room due to low oxygen and rapid heart rate.  weight 151# this admit  citrus fish and shellfish allergy noted as well as above mentioned avoidance of lactose   patient on NC

## 2023-03-21 NOTE — DISCHARGE NOTE PROVIDER - HOSPITAL COURSE
Patient is a 62-year-old female with past medical history of YULIA cervical radiculopathy Cushing syndrome diabetes fibromyalgia gastroparesis GERD hypothyroidism on kidney stones migraines mitral valve prolapse neurogenic bladder osteoarthritis Raynaud's coming in complaining of fever for the last few days.  Patient states cough and sore throat started yesterday.  Patient denies any abdominal pain nausea vomiting diarrhea chest pain or shortness of breath.  Patient went to urgent care flu and COVID done advised to come to emergency room due to low oxygen and rapid heart rate.  Test result pending.  Patient denies any recent travels leg swelling or sick contacts.  Patient vaccinated for COVID and flu. Patient received several rounds of nebs and solumedrol 125mg ivp x 1 without much improvement. She continued to desaturate when taken off the oxygen.     Patient admitted with acute respiratory failure with hypoxia -- 2/2 to viral PNA/acute bronchitis 2/2 enterovirus  Patient treated with iv steroids and nebs with slow improvement  PNA ruled out  Blood cultures NTD  Patient going home on home O2    >35 minutes spent on discharge   Patient is a 62-year-old female with past medical history of YULIA cervical radiculopathy Cushing syndrome diabetes fibromyalgia gastroparesis GERD hypothyroidism on kidney stones migraines mitral valve prolapse neurogenic bladder osteoarthritis Raynaud's coming in complaining of fever for the last few days.  Patient states cough and sore throat started yesterday.  Patient denies any abdominal pain nausea vomiting diarrhea chest pain or shortness of breath.  Patient went to urgent care flu and COVID done advised to come to emergency room due to low oxygen and rapid heart rate.  Test result pending.  Patient denies any recent travels leg swelling or sick contacts.  Patient vaccinated for COVID and flu. Patient received several rounds of nebs and solumedrol 125mg ivp x 1 without much improvement. She continued to desaturate when taken off the oxygen.     Patient admitted with acute respiratory failure with hypoxia -- 2/2 to viral PNA/acute bronchitis 2/2 enterovirus  Patient treated with iv steroids and nebs with slow improvement  PNA ruled out  Blood cultures NTD  Going to [facility]    >35 minutes spent on discharge

## 2023-03-21 NOTE — DISCHARGE NOTE PROVIDER - CARE PROVIDERS DIRECT ADDRESSES
[email or web address]

## 2023-03-21 NOTE — DISCHARGE NOTE PROVIDER - PROVIDER TOKENS
PROVIDER:[TOKEN:[[id number]],FOLLOWUP:[1 week],ESTABLISHEDPATIENT:[T]],PROVIDER:[TOKEN:[[id number]],FOLLOWUP:[1 week],ESTABLISHEDPATIENT:[T]]

## 2023-03-21 NOTE — DISCHARGE NOTE PROVIDER - CARE PROVIDER_API CALL
Shante Larkin (DO)  Family Medicine  [address], [address]  [place] [address]  Phone: [phone]  Fax: [phone]  Established Patient  Follow Up Time: 1 week    Anh Lafleur)  Critical Care Medicine; Internal Medicine; Pulmonary Disease  [address], [address]  [place] [address]  Phone: [phone]  Fax: [phone]  Established Patient  Follow Up Time: 1 week

## 2023-03-21 NOTE — DISCHARGE NOTE NURSING/CASE MANAGEMENT/SOCIAL WORK - NSDCPEFALRISK_GEN_ALL_CORE
For information on Fall & Injury Prevention, visit: [email or web address] OR  [email or web address] OR  https://www.cdc.gov/steadi/patient.html

## 2023-03-21 NOTE — DISCHARGE NOTE PROVIDER - NSDCFUSCHEDAPPT_GEN_ALL_CORE_FT
Reynold Castaneda  [facility] Physician [facility]  CARDIOLOGY [address]  Scheduled Appointment: 03/27/2023

## 2023-03-21 NOTE — DIETITIAN INITIAL EVALUATION ADULT - PERTINENT LABORATORY DATA
03-20    142  |  107  |  22  ----------------------------<  106<H>  3.7   |  30  |  0.73    Ca    9.1      20 Mar 2023 06:07  Mg     2.4     03-20    POCT Blood Glucose.: 108 mg/dL (03-21-23 @ 07:38)  A1C with Estimated Average Glucose Result: 5.6 % (03-15-23 @ 07:10)

## 2023-03-21 NOTE — DISCHARGE NOTE PROVIDER - NSDCCPCAREPLAN_GEN_ALL_CORE_FT
PRINCIPAL DISCHARGE DIAGNOSIS  Diagnosis: Acute bronchitis  Assessment and Plan of Treatment: Continue current medications  Follow-up with your primary care doctor within 1 week.

## 2023-03-22 LAB — SARS-COV-2 RNA SPEC QL NAA+PROBE: SIGNIFICANT CHANGE UP

## 2023-03-22 RX ADMIN — POLYETHYLENE GLYCOL 3350 17 GRAM(S): 17 POWDER, FOR SOLUTION ORAL at 05:33

## 2023-03-22 RX ADMIN — METFORMIN HYDROCHLORIDE 500 MILLIGRAM(S): 850 TABLET ORAL at 21:07

## 2023-03-22 RX ADMIN — Medication 1 GRAM(S): at 05:32

## 2023-03-22 RX ADMIN — Medication 1 GRAM(S): at 23:48

## 2023-03-22 RX ADMIN — Medication 600 MILLIGRAM(S): at 18:31

## 2023-03-22 RX ADMIN — SENNA PLUS 2 TABLET(S): 8.6 TABLET ORAL at 21:08

## 2023-03-22 RX ADMIN — Medication 650 MILLIGRAM(S): at 16:36

## 2023-03-22 RX ADMIN — PANTOPRAZOLE SODIUM 40 MILLIGRAM(S): 20 TABLET, DELAYED RELEASE ORAL at 05:37

## 2023-03-22 RX ADMIN — Medication 600 MILLIGRAM(S): at 05:33

## 2023-03-22 RX ADMIN — Medication 50 MILLIGRAM(S): at 09:46

## 2023-03-22 RX ADMIN — Medication 50 MILLIGRAM(S): at 01:16

## 2023-03-22 RX ADMIN — Medication 400 MILLIGRAM(S): at 20:04

## 2023-03-22 RX ADMIN — CELECOXIB 200 MILLIGRAM(S): 200 CAPSULE ORAL at 11:56

## 2023-03-22 RX ADMIN — ALBUTEROL 2.5 MILLIGRAM(S): 90 AEROSOL, METERED ORAL at 13:18

## 2023-03-22 RX ADMIN — CELECOXIB 200 MILLIGRAM(S): 200 CAPSULE ORAL at 12:56

## 2023-03-22 RX ADMIN — POLYETHYLENE GLYCOL 3350 17 GRAM(S): 17 POWDER, FOR SOLUTION ORAL at 18:30

## 2023-03-22 RX ADMIN — Medication 100 MILLIGRAM(S): at 15:15

## 2023-03-22 RX ADMIN — Medication 225 MICROGRAM(S): at 05:32

## 2023-03-22 RX ADMIN — HYDROMORPHONE HYDROCHLORIDE 4 MILLIGRAM(S): 2 INJECTION INTRAMUSCULAR; INTRAVENOUS; SUBCUTANEOUS at 12:56

## 2023-03-22 RX ADMIN — HYDROMORPHONE HYDROCHLORIDE 4 MILLIGRAM(S): 2 INJECTION INTRAMUSCULAR; INTRAVENOUS; SUBCUTANEOUS at 11:56

## 2023-03-22 RX ADMIN — Medication 5 MILLIGRAM(S): at 05:33

## 2023-03-22 RX ADMIN — Medication 1 GRAM(S): at 11:56

## 2023-03-22 RX ADMIN — ENOXAPARIN SODIUM 40 MILLIGRAM(S): 100 INJECTION SUBCUTANEOUS at 01:18

## 2023-03-22 RX ADMIN — Medication 1 GRAM(S): at 18:31

## 2023-03-22 RX ADMIN — ALBUTEROL 2.5 MILLIGRAM(S): 90 AEROSOL, METERED ORAL at 07:23

## 2023-03-22 RX ADMIN — Medication 1 GRAM(S): at 00:09

## 2023-03-22 RX ADMIN — ALBUTEROL 2.5 MILLIGRAM(S): 90 AEROSOL, METERED ORAL at 00:46

## 2023-03-22 RX ADMIN — Medication 650 MILLIGRAM(S): at 15:15

## 2023-03-22 RX ADMIN — MEMANTINE HYDROCHLORIDE 10 MILLIGRAM(S): 10 TABLET ORAL at 05:32

## 2023-03-22 RX ADMIN — ALBUTEROL 2.5 MILLIGRAM(S): 90 AEROSOL, METERED ORAL at 20:16

## 2023-03-22 RX ADMIN — Medication 100 MILLIGRAM(S): at 09:45

## 2023-03-22 RX ADMIN — ATORVASTATIN CALCIUM 40 MILLIGRAM(S): 80 TABLET, FILM COATED ORAL at 21:07

## 2023-03-22 RX ADMIN — Medication 50 MILLIGRAM(S): at 18:31

## 2023-03-22 RX ADMIN — Medication 400 MILLIGRAM(S): at 09:46

## 2023-03-22 RX ADMIN — MEMANTINE HYDROCHLORIDE 10 MILLIGRAM(S): 10 TABLET ORAL at 18:31

## 2023-03-22 NOTE — SOCIAL WORK PROGRESS NOTE - NSSWPROGRESSNOTE_GEN_ALL_CORE
Per PT, pt is home w PT vs [facility]. At this point in time pt wishes to go to [facility]. Pt would like to work on her strength while using 02. O2 is not something she meets criteria for at home. JONNA requested, at this time, after reviewing list, first choice is [facility]. Sw informed pt she would need to choose additional facilities. Pt reviewing list.

## 2023-03-23 RX ORDER — CLONAZEPAM 1 MG
0.5 TABLET ORAL DAILY
Refills: 0 | Status: DISCONTINUED | OUTPATIENT
Start: 2023-03-23 | End: 2023-03-24

## 2023-03-23 RX ORDER — MODAFINIL 200 MG/1
400 TABLET ORAL DAILY
Refills: 0 | Status: DISCONTINUED | OUTPATIENT
Start: 2023-03-23 | End: 2023-03-24

## 2023-03-23 RX ORDER — HYDROMORPHONE HYDROCHLORIDE 2 MG/ML
4 INJECTION INTRAMUSCULAR; INTRAVENOUS; SUBCUTANEOUS DAILY
Refills: 0 | Status: DISCONTINUED | OUTPATIENT
Start: 2023-03-23 | End: 2023-03-24

## 2023-03-23 RX ADMIN — Medication 50 MILLIGRAM(S): at 10:39

## 2023-03-23 RX ADMIN — Medication 50 MILLIGRAM(S): at 17:55

## 2023-03-23 RX ADMIN — ENOXAPARIN SODIUM 40 MILLIGRAM(S): 100 INJECTION SUBCUTANEOUS at 01:06

## 2023-03-23 RX ADMIN — Medication 0.5 MILLIGRAM(S): at 23:43

## 2023-03-23 RX ADMIN — Medication 400 MILLIGRAM(S): at 08:29

## 2023-03-23 RX ADMIN — Medication 650 MILLIGRAM(S): at 21:45

## 2023-03-23 RX ADMIN — ATORVASTATIN CALCIUM 40 MILLIGRAM(S): 80 TABLET, FILM COATED ORAL at 21:33

## 2023-03-23 RX ADMIN — METFORMIN HYDROCHLORIDE 500 MILLIGRAM(S): 850 TABLET ORAL at 21:35

## 2023-03-23 RX ADMIN — Medication 1 GRAM(S): at 17:55

## 2023-03-23 RX ADMIN — Medication 1 GRAM(S): at 12:26

## 2023-03-23 RX ADMIN — ALBUTEROL 2.5 MILLIGRAM(S): 90 AEROSOL, METERED ORAL at 19:57

## 2023-03-23 RX ADMIN — HYDROMORPHONE HYDROCHLORIDE 4 MILLIGRAM(S): 2 INJECTION INTRAMUSCULAR; INTRAVENOUS; SUBCUTANEOUS at 23:43

## 2023-03-23 RX ADMIN — Medication 225 MICROGRAM(S): at 05:12

## 2023-03-23 RX ADMIN — Medication 1 GRAM(S): at 23:43

## 2023-03-23 RX ADMIN — PANTOPRAZOLE SODIUM 40 MILLIGRAM(S): 20 TABLET, DELAYED RELEASE ORAL at 05:13

## 2023-03-23 RX ADMIN — MEMANTINE HYDROCHLORIDE 10 MILLIGRAM(S): 10 TABLET ORAL at 17:55

## 2023-03-23 RX ADMIN — CELECOXIB 200 MILLIGRAM(S): 200 CAPSULE ORAL at 12:26

## 2023-03-23 RX ADMIN — ONDANSETRON 4 MILLIGRAM(S): 8 TABLET, FILM COATED ORAL at 21:33

## 2023-03-23 RX ADMIN — MEMANTINE HYDROCHLORIDE 10 MILLIGRAM(S): 10 TABLET ORAL at 05:13

## 2023-03-23 RX ADMIN — Medication 50 MILLIGRAM(S): at 01:07

## 2023-03-23 RX ADMIN — CELECOXIB 200 MILLIGRAM(S): 200 CAPSULE ORAL at 13:26

## 2023-03-23 RX ADMIN — Medication 600 MILLIGRAM(S): at 05:13

## 2023-03-23 RX ADMIN — Medication 1 GRAM(S): at 05:13

## 2023-03-23 RX ADMIN — Medication 650 MILLIGRAM(S): at 21:34

## 2023-03-23 RX ADMIN — ALBUTEROL 2.5 MILLIGRAM(S): 90 AEROSOL, METERED ORAL at 01:39

## 2023-03-23 RX ADMIN — Medication 600 MILLIGRAM(S): at 17:55

## 2023-03-23 RX ADMIN — ALBUTEROL 2.5 MILLIGRAM(S): 90 AEROSOL, METERED ORAL at 12:49

## 2023-03-23 RX ADMIN — LOSARTAN POTASSIUM 100 MILLIGRAM(S): 100 TABLET, FILM COATED ORAL at 05:13

## 2023-03-23 RX ADMIN — Medication 100 MILLIGRAM(S): at 21:33

## 2023-03-23 RX ADMIN — POLYETHYLENE GLYCOL 3350 17 GRAM(S): 17 POWDER, FOR SOLUTION ORAL at 05:14

## 2023-03-23 RX ADMIN — AMLODIPINE BESYLATE 10 MILLIGRAM(S): 2.5 TABLET ORAL at 14:03

## 2023-03-23 RX ADMIN — ALBUTEROL 2.5 MILLIGRAM(S): 90 AEROSOL, METERED ORAL at 07:45

## 2023-03-23 NOTE — PROGRESS NOTE ADULT - PROBLEM SELECTOR PROBLEM 3
Diabetes mellitus, type 2

## 2023-03-23 NOTE — PROGRESS NOTE ADULT - PROBLEM SELECTOR PROBLEM 5
Fibromyalgia

## 2023-03-23 NOTE — PROGRESS NOTE ADULT - PROBLEM SELECTOR PLAN 2
Continue Olmesartan 40mg qd, Labetalol 400mg bid and Norvasc 10mg qd
BP improving  Continue Olmesartan 40mg qd, Labetalol 400mg bid and Norvasc 10mg qd
BP improving  Restart Olmesartan 40mg qd   Restart Labetalol 400mg bid and Norvasc 10mg qd
Hold BP meds 2/2 borderline BP
BP improving  Restart Olmesartan 40mg qd for now  Restart Labetolol and Norvasc as tolerated
Continue Olmesartan 40mg qd, Labetalol 400mg bid and Norvasc 10mg qd

## 2023-03-23 NOTE — PROGRESS NOTE ADULT - PROBLEM SELECTOR PLAN 3
Continue metformin  RISS  Risk of hyperglycemia on steroids
Continue metformin  RISS
Continue metformin  RISS  Risk of hyperglycemia on steroids
Continue metformin  RISS
Continue metformin  RISS  Risk of hyperglycemia on steroids
Continue metformin  RISS  Risk of hyperglycemia on steroids
Continue metformin  RISS
Continue metformin  RISS  Risk of hyperglycemia on steroids

## 2023-03-23 NOTE — PROGRESS NOTE ADULT - PROBLEM SELECTOR PLAN 4
Continue statin

## 2023-03-23 NOTE — PROGRESS NOTE ADULT - PROBLEM SELECTOR PLAN 5
Continue home meds

## 2023-03-23 NOTE — PROGRESS NOTE ADULT - PROBLEM SELECTOR PLAN 1
Possibly multifactorial -- 2/2 to viral PNA/acute bronchitis 2/2 enterovirus  Titrate off oxygen as tolerated  Cough suppressants  Oxygen prn  Continue IV steroids, nebs  Pulmonary f/u  Further work-up/management pending clinical course.
Possibly multifactorial -- 2/2 to viral PNA/acute bronchitis 2/2 enterovirus  Will need home oxygen  Cough suppressants  Change to home dose po hydrocortisone  Pulmonary f/u -- cleared for discharge home  Further work-up/management pending clinical course.
Possibly multifactorial -- 2/2 to viral PNA/acute bronchitis 2/2 enterovirus  Titrate off oxygen as tolerated  Cough suppressants  Oxygen prn  Continue IV steroids, nebs  Pulmonary f/u  Further work-up/management pending clinical course.
Possibly multifactorial -- 2/2 to viral PNA/acute bronchitis 2/2 enterovirus  Will need home oxygen  Cough suppressants  Change to home dose po hydrocortisone  Pulmonary f/u -- cleared for discharge home  Further work-up/management pending clinical course.
Possibly multifactorial -- 2/2 to viral PNA/acute bronchitis 2/2 enterovirus  Oxygen prn  Cough suppressants  Continue home dose po hydrocortisone  Pulmonary f/u -- cleared for discharge

## 2023-03-23 NOTE — SOCIAL WORK PROGRESS NOTE - NSSWPROGRESSNOTE_GEN_ALL_CORE
Pt remains on o2/ plan remains for [facility]. Pt reviewing for additional choices as [facility] remains first choice. 24 hr notice given. Sw to follow for anticipated dc tomorrow.

## 2023-03-24 VITALS
HEART RATE: 70 BPM | OXYGEN SATURATION: 97 % | TEMPERATURE: 98 F | RESPIRATION RATE: 18 BRPM | SYSTOLIC BLOOD PRESSURE: 116 MMHG | DIASTOLIC BLOOD PRESSURE: 68 MMHG

## 2023-03-24 PROCEDURE — 86803 HEPATITIS C AB TEST: CPT

## 2023-03-24 PROCEDURE — 85730 THROMBOPLASTIN TIME PARTIAL: CPT

## 2023-03-24 PROCEDURE — 80053 COMPREHEN METABOLIC PANEL: CPT

## 2023-03-24 PROCEDURE — 97162 PT EVAL MOD COMPLEX 30 MIN: CPT

## 2023-03-24 PROCEDURE — 96375 TX/PRO/DX INJ NEW DRUG ADDON: CPT

## 2023-03-24 PROCEDURE — 81001 URINALYSIS AUTO W/SCOPE: CPT

## 2023-03-24 PROCEDURE — 87798 DETECT AGENT NOS DNA AMP: CPT

## 2023-03-24 PROCEDURE — 83605 ASSAY OF LACTIC ACID: CPT

## 2023-03-24 PROCEDURE — 83036 HEMOGLOBIN GLYCOSYLATED A1C: CPT

## 2023-03-24 PROCEDURE — 85027 COMPLETE CBC AUTOMATED: CPT

## 2023-03-24 PROCEDURE — 82962 GLUCOSE BLOOD TEST: CPT

## 2023-03-24 PROCEDURE — 94640 AIRWAY INHALATION TREATMENT: CPT

## 2023-03-24 PROCEDURE — 87651 STREP A DNA AMP PROBE: CPT

## 2023-03-24 PROCEDURE — 85610 PROTHROMBIN TIME: CPT

## 2023-03-24 PROCEDURE — 96374 THER/PROPH/DIAG INJ IV PUSH: CPT

## 2023-03-24 PROCEDURE — 36415 COLL VENOUS BLD VENIPUNCTURE: CPT

## 2023-03-24 PROCEDURE — 83735 ASSAY OF MAGNESIUM: CPT

## 2023-03-24 PROCEDURE — 94760 N-INVAS EAR/PLS OXIMETRY 1: CPT

## 2023-03-24 PROCEDURE — 85025 COMPLETE CBC W/AUTO DIFF WBC: CPT

## 2023-03-24 PROCEDURE — 80048 BASIC METABOLIC PNL TOTAL CA: CPT

## 2023-03-24 PROCEDURE — 99285 EMERGENCY DEPT VISIT HI MDM: CPT

## 2023-03-24 PROCEDURE — 0225U NFCT DS DNA&RNA 21 SARSCOV2: CPT

## 2023-03-24 PROCEDURE — U0005: CPT

## 2023-03-24 PROCEDURE — 87086 URINE CULTURE/COLONY COUNT: CPT

## 2023-03-24 PROCEDURE — 71250 CT THORAX DX C-: CPT

## 2023-03-24 PROCEDURE — 97116 GAIT TRAINING THERAPY: CPT

## 2023-03-24 PROCEDURE — U0003: CPT

## 2023-03-24 PROCEDURE — 84145 PROCALCITONIN (PCT): CPT

## 2023-03-24 PROCEDURE — 87040 BLOOD CULTURE FOR BACTERIA: CPT

## 2023-03-24 PROCEDURE — 93005 ELECTROCARDIOGRAM TRACING: CPT

## 2023-03-24 PROCEDURE — 97530 THERAPEUTIC ACTIVITIES: CPT

## 2023-03-24 PROCEDURE — 71045 X-RAY EXAM CHEST 1 VIEW: CPT

## 2023-03-24 PROCEDURE — 80076 HEPATIC FUNCTION PANEL: CPT

## 2023-03-24 RX ADMIN — MEMANTINE HYDROCHLORIDE 10 MILLIGRAM(S): 10 TABLET ORAL at 05:38

## 2023-03-24 RX ADMIN — PANTOPRAZOLE SODIUM 40 MILLIGRAM(S): 20 TABLET, DELAYED RELEASE ORAL at 05:38

## 2023-03-24 RX ADMIN — Medication 225 MICROGRAM(S): at 05:37

## 2023-03-24 RX ADMIN — HYDROMORPHONE HYDROCHLORIDE 4 MILLIGRAM(S): 2 INJECTION INTRAMUSCULAR; INTRAVENOUS; SUBCUTANEOUS at 12:09

## 2023-03-24 RX ADMIN — MODAFINIL 400 MILLIGRAM(S): 200 TABLET ORAL at 12:09

## 2023-03-24 RX ADMIN — ALBUTEROL 2.5 MILLIGRAM(S): 90 AEROSOL, METERED ORAL at 07:51

## 2023-03-24 RX ADMIN — ENOXAPARIN SODIUM 40 MILLIGRAM(S): 100 INJECTION SUBCUTANEOUS at 02:00

## 2023-03-24 RX ADMIN — Medication 0.5 MILLIGRAM(S): at 12:08

## 2023-03-24 RX ADMIN — Medication 50 MILLIGRAM(S): at 02:00

## 2023-03-24 RX ADMIN — Medication 50 MILLIGRAM(S): at 11:33

## 2023-03-24 RX ADMIN — Medication 1 GRAM(S): at 05:38

## 2023-03-24 RX ADMIN — Medication 400 MILLIGRAM(S): at 07:29

## 2023-03-24 RX ADMIN — Medication 1 GRAM(S): at 12:08

## 2023-03-24 RX ADMIN — LOSARTAN POTASSIUM 100 MILLIGRAM(S): 100 TABLET, FILM COATED ORAL at 05:38

## 2023-03-24 RX ADMIN — Medication 100 MILLIGRAM(S): at 05:38

## 2023-03-24 RX ADMIN — Medication 600 MILLIGRAM(S): at 05:37

## 2023-03-24 RX ADMIN — CELECOXIB 200 MILLIGRAM(S): 200 CAPSULE ORAL at 12:08

## 2023-03-24 RX ADMIN — HYDROMORPHONE HYDROCHLORIDE 4 MILLIGRAM(S): 2 INJECTION INTRAMUSCULAR; INTRAVENOUS; SUBCUTANEOUS at 00:00

## 2023-03-24 NOTE — PROGRESS NOTE ADULT - REASON FOR ADMISSION
flu-like symptoms

## 2023-03-24 NOTE — SOCIAL WORK PROGRESS NOTE - NSSWPROGRESSNOTE_GEN_ALL_CORE
Pt for dc to [facility] today. Pt in agreement. For 1pm  via [facility]. all paperwork to accompany patient. no further  services indicated at this time.   plan[facility] p/u 1pm.

## 2023-03-24 NOTE — PROGRESS NOTE ADULT - ASSESSMENT
62-year-old female with past medical history of YULIA cervical radiculopathy Cushing syndrome diabetes fibromyalgia gastroparesis GERD hypothyroidism on kidney stones migraines mitral valve prolapse neurogenic bladder osteoarthritis Raynaud's coming in complaining of fever for the last few days.    check sat on RA  BP optimization in progress  vs noted    cough - malaise -   ct chest neg for pna  wean fio2 - check Sat on RA at rest and on exertion  cough rx regimen  on nebs  monitor VS and HD  cont med rx regimen  pt is known to me from prior admissions - old records reviewed  RVP - URI - sx management - tylenol - cough rx regimen   nutrition  emotional support  keep sat > 90 pct  discussed plan of care with the patient
62-year-old female with past medical history of YULIA cervical radiculopathy Cushing syndrome diabetes fibromyalgia gastroparesis GERD hypothyroidism on kidney stones migraines mitral valve prolapse neurogenic bladder osteoarthritis Raynaud's coming in complaining of fever for the last few days.    constipation reported  vs noted  wean fio2      cough - malaise -   ct chest neg for pna  wean fio2 - check Sat on RA at rest and on exertion  cough rx regimen  on nebs  monitor VS and HD  cont med rx regimen  pt is known to me from prior admissions - old records reviewed  RVP - URI - sx management - tylenol - cough rx regimen   nutrition  emotional support  keep sat > 90 pct  discussed plan of care with the patient  
62-year-old female with past medical history of YULIA cervical radiculopathy Cushing syndrome diabetes fibromyalgia gastroparesis GERD hypothyroidism on kidney stones migraines mitral valve prolapse neurogenic bladder osteoarthritis Raynaud's coming in complaining of fever for the last few days.    cough - malaise -   ct chest neg for pna  wean fio2 - check Sat on RA at rest and on exertion  cough rx regimen  on nebs  monitor VS and HD  cont med rx regimen  pt is known to me from prior admissions - old records reviewed  RVP - URI - sx management - tylenol - cough rx regimen   nutrition  emotional support  keep sat > 90 pct  discussed plan of care with the patient
62-year-old female with past medical history of YULIA cervical radiculopathy Cushing syndrome diabetes fibromyalgia gastroparesis GERD hypothyroidism on kidney stones migraines mitral valve prolapse neurogenic bladder osteoarthritis Raynaud's coming in complaining of fever for the last few days.    dc plan  home dose of steroids    cough - malaise -   ct chest neg for pna  wean fio2 - check Sat on RA at rest and on exertion  cough rx regimen  on nebs  monitor VS and HD  cont med rx regimen  pt is known to me from prior admissions - old records reviewed  RVP - URI - sx management - tylenol - cough rx regimen   nutrition  emotional support  keep sat > 90 pct  discussed plan of care with the patient
62-year-old female with past medical history of YULIA cervical radiculopathy Cushing syndrome diabetes fibromyalgia gastroparesis GERD hypothyroidism on kidney stones migraines mitral valve prolapse neurogenic bladder osteoarthritis Raynaud's coming in complaining of fever for the last few days.    poss HIRO  Sw follow up noted  wean fio2 as tolerated  vs noted      cough - malaise -   ct chest neg for pna  wean fio2 - check Sat on RA at rest and on exertion  cough rx regimen  on nebs  monitor VS and HD  cont med rx regimen  pt is known to me from prior admissions - old records reviewed  RVP - URI - sx management - tylenol - cough rx regimen   nutrition  emotional support  keep sat > 90 pct  discussed plan of care with the patient  
62-year-old female with past medical history of YULIA cervical radiculopathy Cushing syndrome diabetes fibromyalgia gastroparesis GERD hypothyroidism on kidney stones migraines mitral valve prolapse neurogenic bladder osteoarthritis Raynaud's coming in complaining of fever for the last few days.    check RA sat  on steroids IV  vs noted  wean fio2  cm follow up noted    cough - malaise -   ct chest neg for pna  wean fio2 - check Sat on RA at rest and on exertion  cough rx regimen  on nebs  monitor VS and HD  cont med rx regimen  pt is known to me from prior admissions - old records reviewed  RVP - URI - sx management - tylenol - cough rx regimen   nutrition  emotional support  keep sat > 90 pct  discussed plan of care with the patient  
62-year-old female with past medical history of YULIA cervical radiculopathy Cushing syndrome diabetes fibromyalgia gastroparesis GERD hypothyroidism on kidney stones migraines mitral valve prolapse neurogenic bladder osteoarthritis Raynaud's coming in complaining of fever for the last few days.    check sat on RA    cough - malaise -   ct chest neg for pna  wean fio2 - check Sat on RA at rest and on exertion  cough rx regimen  on nebs  monitor VS and HD  cont med rx regimen  pt is known to me from prior admissions - old records reviewed  RVP - URI - sx management - tylenol - cough rx regimen   nutrition  emotional support  keep sat > 90 pct  discussed plan of care with the patient
62-year-old female with past medical history of YULIA cervical radiculopathy Cushing syndrome diabetes fibromyalgia gastroparesis GERD hypothyroidism on kidney stones migraines mitral valve prolapse neurogenic bladder osteoarthritis Raynaud's coming in complaining of fever for the last few days.    dc planning  tolerating RA when off o2     cough - malaise -   ct chest neg for pna  wean fio2 - check Sat on RA at rest and on exertion  cough rx regimen  on nebs  monitor VS and HD  cont med rx regimen  pt is known to me from prior admissions - old records reviewed  RVP - URI - sx management - tylenol - cough rx regimen   nutrition  emotional support  keep sat > 90 pct  discussed plan of care with the patient
62 F comes with flu-like symptoms found to have rhinovirus, hypoxia and possible superimposed PNA
62-year-old female with past medical history of YULIA cervical radiculopathy Cushing syndrome diabetes fibromyalgia gastroparesis GERD hypothyroidism on kidney stones migraines mitral valve prolapse neurogenic bladder osteoarthritis Raynaud's coming in complaining of fever for the last few days.    poss HIRO  Sw follow up noted  wean fio2 as tolerated  vs noted  anxiolytic  pain rx regimen    cough - malaise -   ct chest neg for pna  wean fio2 - check Sat on RA at rest and on exertion  cough rx regimen  on nebs  monitor VS and HD  cont med rx regimen  pt is known to me from prior admissions - old records reviewed  RVP - URI - sx management - tylenol - cough rx regimen   nutrition  emotional support  keep sat > 90 pct  discussed plan of care with the patient  
62 F comes with flu-like symptoms found to have rhinovirus, hypoxia and possible superimposed PNA

## 2023-03-24 NOTE — PROGRESS NOTE ADULT - PROVIDER SPECIALTY LIST ADULT
Internal Medicine
Pulmonology
Internal Medicine
Pulmonology
Internal Medicine

## 2023-03-24 NOTE — PROGRESS NOTE ADULT - SUBJECTIVE AND OBJECTIVE BOX
Date/Time Patient Seen:  		  Referring MD:   Data Reviewed	       Patient is a 62y old  Female who presents with a chief complaint of flu-like symptoms (19 Mar 2023 10:41)      Subjective/HPI     PAST MEDICAL & SURGICAL HISTORY:  Raynaud&#x27;s disease    Fibromyalgia    Thyroiditis  hashimottos    Osteoarthritis    Migraine    Cushing&#x27;s syndrome    Reflex sympathetic dystrophy    MVP (mitral valve prolapse)    GERD (gastroesophageal reflux disease)    Cervical radiculopathy    TMJ (dislocation of temporomandibular joint)    Neurogenic bladder    Hypothyroidism    Kidney stone  left    Gastroparesis    Acute kidney injury    Diabetes    S/P cholecystectomy    S/P knee surgery          Medication list         MEDICATIONS  (STANDING):  albuterol    0.083% 2.5 milliGRAM(s) Nebulizer every 6 hours  amLODIPine   Tablet 10 milliGRAM(s) Oral <User Schedule>  atorvastatin 40 milliGRAM(s) Oral at bedtime  celecoxib 200 milliGRAM(s) Oral daily  clonazePAM  Tablet 0.5 milliGRAM(s) Oral daily  dextrose 5%. 1000 milliLiter(s) (50 mL/Hr) IV Continuous <Continuous>  dextrose 5%. 1000 milliLiter(s) (100 mL/Hr) IV Continuous <Continuous>  dextrose 50% Injectable 25 Gram(s) IV Push once  dextrose 50% Injectable 12.5 Gram(s) IV Push once  dextrose 50% Injectable 25 Gram(s) IV Push once  enoxaparin Injectable 40 milliGRAM(s) SubCutaneous every 24 hours  fentaNYL   Patch  25 MICROgram(s)/Hr. 1 Patch Transdermal every 48 hours  glucagon  Injectable 1 milliGRAM(s) IntraMuscular once  guaiFENesin  milliGRAM(s) Oral every 12 hours  hydrocortisone sodium succinate Injectable 50 milliGRAM(s) IV Push every 8 hours  HYDROmorphone   Tablet 4 milliGRAM(s) Oral daily  insulin lispro (ADMELOG) corrective regimen sliding scale   SubCutaneous three times a day before meals  insulin lispro (ADMELOG) corrective regimen sliding scale   SubCutaneous at bedtime  labetalol 400 milliGRAM(s) Oral <User Schedule>  levothyroxine 225 MICROGram(s) Oral daily  losartan 100 milliGRAM(s) Oral daily  memantine 10 milliGRAM(s) Oral two times a day  metFORMIN 500 milliGRAM(s) Oral at bedtime  modafinil 400 milliGRAM(s) Oral daily  pantoprazole    Tablet 40 milliGRAM(s) Oral before breakfast  polyethylene glycol 3350 17 Gram(s) Oral two times a day  pregabalin 100 milliGRAM(s) Oral three times a day  senna 2 Tablet(s) Oral at bedtime  sucralfate 1 Gram(s) Oral four times a day    MEDICATIONS  (PRN):  acetaminophen     Tablet .. 650 milliGRAM(s) Oral every 6 hours PRN Temp greater or equal to 38C (100.4F), Mild Pain (1 - 3)  aluminum hydroxide/magnesium hydroxide/simethicone Suspension 30 milliLiter(s) Oral every 4 hours PRN Dyspepsia  bisacodyl 5 milliGRAM(s) Oral every 12 hours PRN Constipation  dextrose Oral Gel 15 Gram(s) Oral once PRN Blood Glucose LESS THAN 70 milliGRAM(s)/deciliter  guaiFENesin Oral Liquid (Sugar-Free) 100 milliGRAM(s) Oral every 6 hours PRN Cough  melatonin 3 milliGRAM(s) Oral at bedtime PRN Insomnia  ondansetron Injectable 4 milliGRAM(s) IV Push every 6 hours PRN Nausea and/or Vomiting         Vitals log        ICU Vital Signs Last 24 Hrs  T(C): 36.8 (19 Mar 2023 20:41), Max: 36.8 (19 Mar 2023 20:41)  T(F): 98.3 (19 Mar 2023 20:41), Max: 98.3 (19 Mar 2023 20:41)  HR: 68 (20 Mar 2023 01:19) (63 - 84)  BP: 112/72 (19 Mar 2023 20:41) (111/60 - 135/82)  BP(mean): --  ABP: --  ABP(mean): --  RR: 17 (19 Mar 2023 20:41) (17 - 18)  SpO2: 98% (20 Mar 2023 01:19) (95% - 99%)    O2 Parameters below as of 20 Mar 2023 01:19  Patient On (Oxygen Delivery Method): nasal cannula,2                 Input and Output:  I&O's Detail      Lab Data                  Review of Systems	      Objective     Physical Examination    heart s1s2  lung dc BS  head nc      Pertinent Lab findings & Imaging      Hunter:  NO   Adequate UO     I&O's Detail           Discussed with:     Cultures:	        Radiology                            
Date/Time Patient Seen:  		  Referring MD:   Data Reviewed	       Patient is a 62y old  Female who presents with a chief complaint of flu-like symptoms (20 Mar 2023 13:12)      Subjective/HPI     PAST MEDICAL & SURGICAL HISTORY:  Raynaud&#x27;s disease    Fibromyalgia    Thyroiditis  hashimottos    Osteoarthritis    Migraine    Cushing&#x27;s syndrome    Reflex sympathetic dystrophy    MVP (mitral valve prolapse)    GERD (gastroesophageal reflux disease)    Cervical radiculopathy    TMJ (dislocation of temporomandibular joint)    Neurogenic bladder    Hypothyroidism    Kidney stone  left    Gastroparesis    Acute kidney injury    Diabetes    S/P cholecystectomy    S/P knee surgery          Medication list         MEDICATIONS  (STANDING):  albuterol    0.083% 2.5 milliGRAM(s) Nebulizer every 6 hours  amLODIPine   Tablet 10 milliGRAM(s) Oral <User Schedule>  atorvastatin 40 milliGRAM(s) Oral at bedtime  celecoxib 200 milliGRAM(s) Oral daily  clonazePAM  Tablet 0.5 milliGRAM(s) Oral daily  dextrose 5%. 1000 milliLiter(s) (50 mL/Hr) IV Continuous <Continuous>  dextrose 5%. 1000 milliLiter(s) (100 mL/Hr) IV Continuous <Continuous>  dextrose 50% Injectable 25 Gram(s) IV Push once  dextrose 50% Injectable 12.5 Gram(s) IV Push once  dextrose 50% Injectable 25 Gram(s) IV Push once  enoxaparin Injectable 40 milliGRAM(s) SubCutaneous every 24 hours  glucagon  Injectable 1 milliGRAM(s) IntraMuscular once  guaiFENesin  milliGRAM(s) Oral every 12 hours  hydrocortisone sodium succinate Injectable 50 milliGRAM(s) IV Push every 8 hours  HYDROmorphone   Tablet 4 milliGRAM(s) Oral daily  insulin lispro (ADMELOG) corrective regimen sliding scale   SubCutaneous three times a day before meals  insulin lispro (ADMELOG) corrective regimen sliding scale   SubCutaneous at bedtime  labetalol 400 milliGRAM(s) Oral <User Schedule>  levothyroxine 225 MICROGram(s) Oral daily  losartan 100 milliGRAM(s) Oral daily  memantine 10 milliGRAM(s) Oral two times a day  metFORMIN 500 milliGRAM(s) Oral at bedtime  modafinil 400 milliGRAM(s) Oral daily  pantoprazole    Tablet 40 milliGRAM(s) Oral before breakfast  polyethylene glycol 3350 17 Gram(s) Oral two times a day  pregabalin 100 milliGRAM(s) Oral three times a day  senna 2 Tablet(s) Oral at bedtime  sucralfate 1 Gram(s) Oral four times a day    MEDICATIONS  (PRN):  acetaminophen     Tablet .. 650 milliGRAM(s) Oral every 6 hours PRN Temp greater or equal to 38C (100.4F), Mild Pain (1 - 3)  aluminum hydroxide/magnesium hydroxide/simethicone Suspension 30 milliLiter(s) Oral every 4 hours PRN Dyspepsia  bisacodyl 5 milliGRAM(s) Oral every 12 hours PRN Constipation  dextrose Oral Gel 15 Gram(s) Oral once PRN Blood Glucose LESS THAN 70 milliGRAM(s)/deciliter  guaiFENesin Oral Liquid (Sugar-Free) 100 milliGRAM(s) Oral every 6 hours PRN Cough  melatonin 3 milliGRAM(s) Oral at bedtime PRN Insomnia  ondansetron Injectable 4 milliGRAM(s) IV Push every 6 hours PRN Nausea and/or Vomiting         Vitals log        ICU Vital Signs Last 24 Hrs  T(C): 36.5 (20 Mar 2023 20:35), Max: 36.6 (20 Mar 2023 05:30)  T(F): 97.7 (20 Mar 2023 20:35), Max: 97.8 (20 Mar 2023 05:30)  HR: 79 (21 Mar 2023 01:00) (72 - 85)  BP: 120/72 (20 Mar 2023 20:35) (113/68 - 134/84)  BP(mean): --  ABP: --  ABP(mean): --  RR: 18 (20 Mar 2023 20:35) (17 - 18)  SpO2: 96% (21 Mar 2023 01:00) (93% - 98%)    O2 Parameters below as of 21 Mar 2023 01:00  Patient On (Oxygen Delivery Method): nasal cannula,2 L                 Input and Output:  I&O's Detail      Lab Data                        10.0   13.73 )-----------( 236      ( 20 Mar 2023 06:07 )             31.6     03-20    142  |  107  |  22  ----------------------------<  106<H>  3.7   |  30  |  0.73    Ca    9.1      20 Mar 2023 06:07  Mg     2.4     03-20              Review of Systems	      Objective     Physical Examination  heart s1s2  lung dc BS        Pertinent Lab findings & Imaging      Hunter:  NO   Adequate UO     I&O's Detail           Discussed with:     Cultures:	        Radiology                            
Date/Time Patient Seen:  		  Referring MD:   Data Reviewed	       Patient is a 62y old  Female who presents with a chief complaint of flu-like symptoms (23 Mar 2023 09:25)      Subjective/HPI     PAST MEDICAL & SURGICAL HISTORY:  Raynaud&#x27;s disease    Fibromyalgia    Thyroiditis  hashimottos    Osteoarthritis    Migraine    Cushing&#x27;s syndrome    Reflex sympathetic dystrophy    MVP (mitral valve prolapse)    GERD (gastroesophageal reflux disease)    Cervical radiculopathy    TMJ (dislocation of temporomandibular joint)    Neurogenic bladder    Hypothyroidism    Kidney stone  left    Gastroparesis    Acute kidney injury    Diabetes    S/P cholecystectomy    S/P knee surgery          Medication list         MEDICATIONS  (STANDING):  albuterol    0.083% 2.5 milliGRAM(s) Nebulizer every 6 hours  amLODIPine   Tablet 10 milliGRAM(s) Oral <User Schedule>  atorvastatin 40 milliGRAM(s) Oral at bedtime  celecoxib 200 milliGRAM(s) Oral daily  clonazePAM  Tablet 0.5 milliGRAM(s) Oral daily  dextrose 5%. 1000 milliLiter(s) (50 mL/Hr) IV Continuous <Continuous>  dextrose 5%. 1000 milliLiter(s) (100 mL/Hr) IV Continuous <Continuous>  dextrose 50% Injectable 25 Gram(s) IV Push once  dextrose 50% Injectable 12.5 Gram(s) IV Push once  dextrose 50% Injectable 25 Gram(s) IV Push once  enoxaparin Injectable 40 milliGRAM(s) SubCutaneous every 24 hours  fentaNYL   Patch  25 MICROgram(s)/Hr. 1 Patch Transdermal every 48 hours  glucagon  Injectable 1 milliGRAM(s) IntraMuscular once  guaiFENesin  milliGRAM(s) Oral every 12 hours  hydrocortisone sodium succinate Injectable 50 milliGRAM(s) IV Push every 8 hours  HYDROmorphone   Tablet 4 milliGRAM(s) Oral daily  insulin lispro (ADMELOG) corrective regimen sliding scale   SubCutaneous three times a day before meals  insulin lispro (ADMELOG) corrective regimen sliding scale   SubCutaneous at bedtime  labetalol 400 milliGRAM(s) Oral <User Schedule>  levothyroxine 225 MICROGram(s) Oral daily  losartan 100 milliGRAM(s) Oral daily  memantine 10 milliGRAM(s) Oral two times a day  metFORMIN 500 milliGRAM(s) Oral at bedtime  modafinil 400 milliGRAM(s) Oral daily  pantoprazole    Tablet 40 milliGRAM(s) Oral before breakfast  polyethylene glycol 3350 17 Gram(s) Oral two times a day  pregabalin 100 milliGRAM(s) Oral three times a day  senna 2 Tablet(s) Oral at bedtime  sucralfate 1 Gram(s) Oral four times a day    MEDICATIONS  (PRN):  acetaminophen     Tablet .. 650 milliGRAM(s) Oral every 6 hours PRN Temp greater or equal to 38C (100.4F), Mild Pain (1 - 3)  aluminum hydroxide/magnesium hydroxide/simethicone Suspension 30 milliLiter(s) Oral every 4 hours PRN Dyspepsia  bisacodyl 5 milliGRAM(s) Oral every 12 hours PRN Constipation  dextrose Oral Gel 15 Gram(s) Oral once PRN Blood Glucose LESS THAN 70 milliGRAM(s)/deciliter  guaiFENesin Oral Liquid (Sugar-Free) 100 milliGRAM(s) Oral every 6 hours PRN Cough  melatonin 3 milliGRAM(s) Oral at bedtime PRN Insomnia  ondansetron Injectable 4 milliGRAM(s) IV Push every 6 hours PRN Nausea and/or Vomiting         Vitals log        ICU Vital Signs Last 24 Hrs  T(C): 36.8 (23 Mar 2023 20:18), Max: 36.8 (23 Mar 2023 20:18)  T(F): 98.2 (23 Mar 2023 20:18), Max: 98.2 (23 Mar 2023 20:18)  HR: 66 (23 Mar 2023 20:18) (61 - 70)  BP: 108/66 (23 Mar 2023 20:18) (108/66 - 155/81)  BP(mean): --  ABP: --  ABP(mean): --  RR: 18 (23 Mar 2023 20:18) (17 - 19)  SpO2: 98% (23 Mar 2023 20:18) (94% - 98%)    O2 Parameters below as of 23 Mar 2023 20:18  Patient On (Oxygen Delivery Method): nasal cannula  O2 Flow (L/min): 2               Input and Output:  I&O's Detail      Lab Data                  Review of Systems	      Objective     Physical Examination    heart s1s2  lung dec BS  head nc      Pertinent Lab findings & Imaging      Hunter:  NO   Adequate UO     I&O's Detail           Discussed with:     Cultures:	        Radiology                            
Patient is a 62y old  Female who presents with a chief complaint of Shortness of breath     (21 Mar 2023 09:56)      INTERVAL HPI/OVERNIGHT EVENTS: Patient seen and examined. NAD. No complaints.    Vital Signs Last 24 Hrs  T(C): 36.6 (21 Mar 2023 12:04), Max: 36.8 (21 Mar 2023 05:31)  T(F): 97.8 (21 Mar 2023 12:04), Max: 98.2 (21 Mar 2023 05:31)  HR: 63 (21 Mar 2023 12:04) (62 - 85)  BP: 111/70 (21 Mar 2023 12:04) (111/70 - 124/77)  BP(mean): --  RR: 18 (21 Mar 2023 12:04) (17 - 18)  SpO2: 94% (21 Mar 2023 12:04) (93% - 97%)    Parameters below as of 21 Mar 2023 12:04  Patient On (Oxygen Delivery Method): nasal cannula        03-20    142  |  107  |  22  ----------------------------<  106<H>  3.7   |  30  |  0.73    Ca    9.1      20 Mar 2023 06:07  Mg     2.4     03-20                            10.0   13.73 )-----------( 236      ( 20 Mar 2023 06:07 )             31.6       CAPILLARY BLOOD GLUCOSE      POCT Blood Glucose.: 133 mg/dL (21 Mar 2023 11:36)  POCT Blood Glucose.: 108 mg/dL (21 Mar 2023 07:38)  POCT Blood Glucose.: 155 mg/dL (20 Mar 2023 21:12)  POCT Blood Glucose.: 122 mg/dL (20 Mar 2023 16:42)              acetaminophen     Tablet .. 650 milliGRAM(s) Oral every 6 hours PRN  albuterol    0.083% 2.5 milliGRAM(s) Nebulizer every 6 hours  aluminum hydroxide/magnesium hydroxide/simethicone Suspension 30 milliLiter(s) Oral every 4 hours PRN  amLODIPine   Tablet 10 milliGRAM(s) Oral <User Schedule>  atorvastatin 40 milliGRAM(s) Oral at bedtime  bisacodyl 5 milliGRAM(s) Oral every 12 hours PRN  celecoxib 200 milliGRAM(s) Oral daily  dextrose 5%. 1000 milliLiter(s) IV Continuous <Continuous>  dextrose 5%. 1000 milliLiter(s) IV Continuous <Continuous>  dextrose 50% Injectable 25 Gram(s) IV Push once  dextrose 50% Injectable 12.5 Gram(s) IV Push once  dextrose 50% Injectable 25 Gram(s) IV Push once  dextrose Oral Gel 15 Gram(s) Oral once PRN  enoxaparin Injectable 40 milliGRAM(s) SubCutaneous every 24 hours  glucagon  Injectable 1 milliGRAM(s) IntraMuscular once  guaiFENesin  milliGRAM(s) Oral every 12 hours  guaiFENesin Oral Liquid (Sugar-Free) 100 milliGRAM(s) Oral every 6 hours PRN  hydrocortisone sodium succinate Injectable 50 milliGRAM(s) IV Push every 8 hours  HYDROmorphone   Tablet 4 milliGRAM(s) Oral daily  insulin lispro (ADMELOG) corrective regimen sliding scale   SubCutaneous three times a day before meals  insulin lispro (ADMELOG) corrective regimen sliding scale   SubCutaneous at bedtime  labetalol 400 milliGRAM(s) Oral <User Schedule>  levothyroxine 225 MICROGram(s) Oral daily  losartan 100 milliGRAM(s) Oral daily  melatonin 3 milliGRAM(s) Oral at bedtime PRN  memantine 10 milliGRAM(s) Oral two times a day  metFORMIN 500 milliGRAM(s) Oral at bedtime  ondansetron Injectable 4 milliGRAM(s) IV Push every 6 hours PRN  pantoprazole    Tablet 40 milliGRAM(s) Oral before breakfast  polyethylene glycol 3350 17 Gram(s) Oral two times a day  pregabalin 100 milliGRAM(s) Oral three times a day  senna 2 Tablet(s) Oral at bedtime  sucralfate 1 Gram(s) Oral four times a day              REVIEW OF SYSTEMS:  CONSTITUTIONAL: No fever, no weight loss, or no fatigue  NECK: No pain, no stiffness  RESPIRATORY: No cough, no wheezing, no chills, no hemoptysis, No shortness of breath  CARDIOVASCULAR: No chest pain, no palpitations, no dizziness, no leg swelling  GASTROINTESTINAL: No abdominal pain. No nausea, no vomiting, no hematemesis; No diarrhea, no constipation. No melena, no hematochezia.  GENITOURINARY: No dysuria, no frequency, no hematuria, no incontinence  NEUROLOGICAL: No headaches, no loss of strength, no numbness, no tremors  SKIN: No itching, no burning  MUSCULOSKELETAL: No joint pain, no swelling; No muscle, no back, no extremity pain  PSYCHIATRIC: No depression, no mood swings,   HEME/LYMPH: No easy bruising, no bleeding gums  ALLERY AND IMMUNOLOGIC: No hives       Consultant(s) Notes Reviewed:  [X] YES  [ ] NO    PHYSICAL EXAM:  GENERAL: NAD  HEAD:  Atraumatic, Normocephalic  EYES: EOMI, PERRLA, conjunctiva and sclera clear  ENMT: No tonsillar erythema, exudates, or enlargement; Moist mucous membranes  NECK: Supple, No JVD  NERVOUS SYSTEM:  Awake & alert  CHEST/LUNG: Clear to auscultation bilaterally; No rales, rhonchi, wheezing,  HEART: Regular rate and rhythm  ABDOMEN: Soft, Nontender, Nondistended; Bowel sounds present  EXTREMITIES:  No clubbing, cyanosis, or edema  LYMPH: No lymphadenopathy noted  SKIN: No rashes      Advanced care planning discussed with patient/family [X] YES   [ ] NO    Advanced care planning discussed with patient/family. Patient's health status was discussed. All appropriate changes have been made regarding patient's end-of-life care. Advanced care planning forms reviewed/discussed/completed.  20 minutes spent.   
Patient is a 62y old  Female who presents with a chief complaint of flu-like symptoms (16 Mar 2023 05:25)      INTERVAL HPI/OVERNIGHT EVENTS: Patient seen and examined. NAD. + cough    Vital Signs Last 24 Hrs  T(C): 36.6 (20 Mar 2023 05:30), Max: 36.8 (19 Mar 2023 20:41)  T(F): 97.8 (20 Mar 2023 05:30), Max: 98.3 (19 Mar 2023 20:41)  HR: 75 (20 Mar 2023 12:45) (68 - 84)  BP: 113/68 (20 Mar 2023 12:45) (112/72 - 134/84)  BP(mean): --  RR: 17 (20 Mar 2023 12:45) (17 - 17)  SpO2: 95% (20 Mar 2023 12:45) (95% - 98%)    Parameters below as of 20 Mar 2023 12:45  Patient On (Oxygen Delivery Method): nasal cannula  O2 Flow (L/min): 2      03-20    142  |  107  |  22  ----------------------------<  106<H>  3.7   |  30  |  0.73    Ca    9.1      20 Mar 2023 06:07  Mg     2.4     03-20                            10.0   13.73 )-----------( 236      ( 20 Mar 2023 06:07 )             31.6       CAPILLARY BLOOD GLUCOSE      POCT Blood Glucose.: 120 mg/dL (20 Mar 2023 11:38)  POCT Blood Glucose.: 93 mg/dL (20 Mar 2023 07:44)  POCT Blood Glucose.: 128 mg/dL (19 Mar 2023 21:09)  POCT Blood Glucose.: 114 mg/dL (19 Mar 2023 16:50)              acetaminophen     Tablet .. 650 milliGRAM(s) Oral every 6 hours PRN  albuterol    0.083% 2.5 milliGRAM(s) Nebulizer every 6 hours  aluminum hydroxide/magnesium hydroxide/simethicone Suspension 30 milliLiter(s) Oral every 4 hours PRN  amLODIPine   Tablet 10 milliGRAM(s) Oral <User Schedule>  atorvastatin 40 milliGRAM(s) Oral at bedtime  bisacodyl 5 milliGRAM(s) Oral every 12 hours PRN  celecoxib 200 milliGRAM(s) Oral daily  clonazePAM  Tablet 0.5 milliGRAM(s) Oral daily  dextrose 5%. 1000 milliLiter(s) IV Continuous <Continuous>  dextrose 5%. 1000 milliLiter(s) IV Continuous <Continuous>  dextrose 50% Injectable 25 Gram(s) IV Push once  dextrose 50% Injectable 12.5 Gram(s) IV Push once  dextrose 50% Injectable 25 Gram(s) IV Push once  dextrose Oral Gel 15 Gram(s) Oral once PRN  enoxaparin Injectable 40 milliGRAM(s) SubCutaneous every 24 hours  fentaNYL   Patch  25 MICROgram(s)/Hr. 1 Patch Transdermal every 48 hours  glucagon  Injectable 1 milliGRAM(s) IntraMuscular once  guaiFENesin  milliGRAM(s) Oral every 12 hours  guaiFENesin Oral Liquid (Sugar-Free) 100 milliGRAM(s) Oral every 6 hours PRN  hydrocortisone sodium succinate Injectable 50 milliGRAM(s) IV Push every 8 hours  HYDROmorphone   Tablet 4 milliGRAM(s) Oral daily  insulin lispro (ADMELOG) corrective regimen sliding scale   SubCutaneous three times a day before meals  insulin lispro (ADMELOG) corrective regimen sliding scale   SubCutaneous at bedtime  labetalol 400 milliGRAM(s) Oral <User Schedule>  levothyroxine 225 MICROGram(s) Oral daily  losartan 100 milliGRAM(s) Oral daily  melatonin 3 milliGRAM(s) Oral at bedtime PRN  memantine 10 milliGRAM(s) Oral two times a day  metFORMIN 500 milliGRAM(s) Oral at bedtime  modafinil 400 milliGRAM(s) Oral daily  ondansetron Injectable 4 milliGRAM(s) IV Push every 6 hours PRN  pantoprazole    Tablet 40 milliGRAM(s) Oral before breakfast  polyethylene glycol 3350 17 Gram(s) Oral two times a day  pregabalin 100 milliGRAM(s) Oral three times a day  senna 2 Tablet(s) Oral at bedtime  sucralfate 1 Gram(s) Oral four times a day          REVIEW OF SYSTEMS:  CONSTITUTIONAL: No fever, no weight loss, or + fatigue  NECK: No pain, no stiffness  RESPIRATORY: + cough, no wheezing, no chills, no hemoptysis, No shortness of breath  CARDIOVASCULAR: No chest pain, no palpitations, no dizziness, no leg swelling  GASTROINTESTINAL: No abdominal pain. No nausea, no vomiting, no hematemesis; No diarrhea, no constipation. No melena, no hematochezia.  GENITOURINARY: No dysuria, no frequency, no hematuria, no incontinence  NEUROLOGICAL: No headaches, no loss of strength, no numbness, no tremors  SKIN: No itching, no burning  MUSCULOSKELETAL: No joint pain, no swelling; No muscle, no back, no extremity pain  PSYCHIATRIC: No depression, no mood swings,   HEME/LYMPH: No easy bruising, no bleeding gums  ALLERY AND IMMUNOLOGIC: No hives       Consultant(s) Notes Reviewed:  [X] YES  [ ] NO    PHYSICAL EXAM:  GENERAL: NAD  HEAD:  Atraumatic, Normocephalic  EYES: EOMI, PERRLA, conjunctiva and sclera clear  ENMT: No tonsillar erythema, exudates, or enlargement; Moist mucous membranes  NECK: Supple, No JVD  NERVOUS SYSTEM:  Awake & alert  CHEST/LUNG: scattered ronchi  HEART: Regular rate and rhythm  ABDOMEN: Soft, Nontender, Nondistended; Bowel sounds present  EXTREMITIES:  No clubbing, cyanosis, or edema  LYMPH: No lymphadenopathy noted  SKIN: No rashes      Advanced care planning discussed with patient/family [X] YES   [ ] NO    Advanced care planning discussed with patient/family. Patient's health status was discussed. All appropriate changes have been made regarding patient's end-of-life care. Advanced care planning forms reviewed/discussed/completed.  20 minutes spent.   
Patient is a 62y old  Female who presents with a chief complaint of flu-like symptoms (16 Mar 2023 05:25)      INTERVAL HPI/OVERNIGHT EVENTS: Patient seen and examined. NAD. + cough    Vital Signs Last 24 Hrs  T(C): 36.8 (17 Mar 2023 05:00), Max: 36.9 (16 Mar 2023 22:06)  T(F): 98.2 (17 Mar 2023 05:00), Max: 98.5 (16 Mar 2023 22:06)  HR: 100 (17 Mar 2023 08:07) (81 - 103)  BP: 142/87 (17 Mar 2023 05:00) (142/87 - 152/80)  BP(mean): --  RR: 18 (17 Mar 2023 12:37) (17 - 18)  SpO2: 97% (17 Mar 2023 12:37) (97% - 99%)    Parameters below as of 17 Mar 2023 12:37  Patient On (Oxygen Delivery Method): nasal cannula  O2 Flow (L/min): 2      03-17    143  |  110<H>  |  21  ----------------------------<  112<H>  3.4<L>   |  28  |  0.74    Ca    9.2      17 Mar 2023 08:28  Mg     2.2     03-17                            10.7   9.39  )-----------( 250      ( 17 Mar 2023 08:28 )             33.8       CAPILLARY BLOOD GLUCOSE      POCT Blood Glucose.: 147 mg/dL (17 Mar 2023 16:52)  POCT Blood Glucose.: 93 mg/dL (17 Mar 2023 11:30)  POCT Blood Glucose.: 139 mg/dL (17 Mar 2023 07:52)  POCT Blood Glucose.: 137 mg/dL (16 Mar 2023 21:30)              acetaminophen     Tablet .. 650 milliGRAM(s) Oral every 6 hours PRN  albuterol    0.083% 2.5 milliGRAM(s) Nebulizer every 6 hours  aluminum hydroxide/magnesium hydroxide/simethicone Suspension 30 milliLiter(s) Oral every 4 hours PRN  atorvastatin 40 milliGRAM(s) Oral at bedtime  celecoxib 200 milliGRAM(s) Oral daily  clonazePAM  Tablet 0.5 milliGRAM(s) Oral daily  dextrose 5%. 1000 milliLiter(s) IV Continuous <Continuous>  dextrose 5%. 1000 milliLiter(s) IV Continuous <Continuous>  dextrose 50% Injectable 25 Gram(s) IV Push once  dextrose 50% Injectable 12.5 Gram(s) IV Push once  dextrose 50% Injectable 25 Gram(s) IV Push once  dextrose Oral Gel 15 Gram(s) Oral once PRN  enoxaparin Injectable 40 milliGRAM(s) SubCutaneous every 24 hours  fentaNYL   Patch  25 MICROgram(s)/Hr. 1 Patch Transdermal every 48 hours  glucagon  Injectable 1 milliGRAM(s) IntraMuscular once  guaiFENesin  milliGRAM(s) Oral every 12 hours  guaiFENesin Oral Liquid (Sugar-Free) 100 milliGRAM(s) Oral every 6 hours PRN  hydrocortisone sodium succinate Injectable 50 milliGRAM(s) IV Push every 8 hours  HYDROmorphone   Tablet 4 milliGRAM(s) Oral daily  insulin lispro (ADMELOG) corrective regimen sliding scale   SubCutaneous three times a day before meals  insulin lispro (ADMELOG) corrective regimen sliding scale   SubCutaneous at bedtime  levothyroxine 225 MICROGram(s) Oral daily  magnesium hydroxide Suspension 30 milliLiter(s) Oral daily PRN  melatonin 3 milliGRAM(s) Oral at bedtime PRN  memantine 10 milliGRAM(s) Oral two times a day  metFORMIN 500 milliGRAM(s) Oral at bedtime  modafinil 400 milliGRAM(s) Oral daily  ondansetron Injectable 4 milliGRAM(s) IV Push every 6 hours PRN  pantoprazole    Tablet 40 milliGRAM(s) Oral before breakfast  polyethylene glycol 3350 17 Gram(s) Oral two times a day  pregabalin 100 milliGRAM(s) Oral three times a day  senna 2 Tablet(s) Oral at bedtime  sucralfate 1 Gram(s) Oral four times a day            REVIEW OF SYSTEMS:  CONSTITUTIONAL: No fever, no weight loss, or + fatigue  NECK: No pain, no stiffness  RESPIRATORY: + cough, no wheezing, no chills, no hemoptysis, No shortness of breath  CARDIOVASCULAR: No chest pain, no palpitations, no dizziness, no leg swelling  GASTROINTESTINAL: No abdominal pain. No nausea, no vomiting, no hematemesis; No diarrhea, no constipation. No melena, no hematochezia.  GENITOURINARY: No dysuria, no frequency, no hematuria, no incontinence  NEUROLOGICAL: No headaches, no loss of strength, no numbness, no tremors  SKIN: No itching, no burning  MUSCULOSKELETAL: No joint pain, no swelling; No muscle, no back, no extremity pain  PSYCHIATRIC: No depression, no mood swings,   HEME/LYMPH: No easy bruising, no bleeding gums  ALLERY AND IMMUNOLOGIC: No hives       Consultant(s) Notes Reviewed:  [X] YES  [ ] NO    PHYSICAL EXAM:  GENERAL: NAD  HEAD:  Atraumatic, Normocephalic  EYES: EOMI, PERRLA, conjunctiva and sclera clear  ENMT: No tonsillar erythema, exudates, or enlargement; Moist mucous membranes  NECK: Supple, No JVD  NERVOUS SYSTEM:  Awake & alert  CHEST/LUNG: scattered ronchi  HEART: Regular rate and rhythm  ABDOMEN: Soft, Nontender, Nondistended; Bowel sounds present  EXTREMITIES:  No clubbing, cyanosis, or edema  LYMPH: No lymphadenopathy noted  SKIN: No rashes      Advanced care planning discussed with patient/family [X] YES   [ ] NO    Advanced care planning discussed with patient/family. Patient's health status was discussed. All appropriate changes have been made regarding patient's end-of-life care. Advanced care planning forms reviewed/discussed/completed.  20 minutes spent.   
Date/Time Patient Seen:  		  Referring MD:   Data Reviewed	       Patient is a 62y old  Female who presents with a chief complaint of flu-like symptoms (18 Mar 2023 13:24)      Subjective/HPI     PAST MEDICAL & SURGICAL HISTORY:  Raynaud&#x27;s disease    Fibromyalgia    Thyroiditis  hashimottos    Osteoarthritis    Migraine    Cushing&#x27;s syndrome    Reflex sympathetic dystrophy    MVP (mitral valve prolapse)    GERD (gastroesophageal reflux disease)    Cervical radiculopathy    TMJ (dislocation of temporomandibular joint)    Neurogenic bladder    Hypothyroidism    Kidney stone  left    Gastroparesis    Acute kidney injury    Diabetes    S/P cholecystectomy    S/P knee surgery          Medication list         MEDICATIONS  (STANDING):  albuterol    0.083% 2.5 milliGRAM(s) Nebulizer every 6 hours  amLODIPine   Tablet 10 milliGRAM(s) Oral <User Schedule>  atorvastatin 40 milliGRAM(s) Oral at bedtime  celecoxib 200 milliGRAM(s) Oral daily  clonazePAM  Tablet 0.5 milliGRAM(s) Oral daily  dextrose 5%. 1000 milliLiter(s) (50 mL/Hr) IV Continuous <Continuous>  dextrose 5%. 1000 milliLiter(s) (100 mL/Hr) IV Continuous <Continuous>  dextrose 50% Injectable 25 Gram(s) IV Push once  dextrose 50% Injectable 12.5 Gram(s) IV Push once  dextrose 50% Injectable 25 Gram(s) IV Push once  enoxaparin Injectable 40 milliGRAM(s) SubCutaneous every 24 hours  fentaNYL   Patch  25 MICROgram(s)/Hr. 1 Patch Transdermal every 48 hours  glucagon  Injectable 1 milliGRAM(s) IntraMuscular once  guaiFENesin  milliGRAM(s) Oral every 12 hours  hydrocortisone sodium succinate Injectable 50 milliGRAM(s) IV Push every 8 hours  HYDROmorphone   Tablet 4 milliGRAM(s) Oral daily  insulin lispro (ADMELOG) corrective regimen sliding scale   SubCutaneous three times a day before meals  insulin lispro (ADMELOG) corrective regimen sliding scale   SubCutaneous at bedtime  labetalol 400 milliGRAM(s) Oral <User Schedule>  levothyroxine 225 MICROGram(s) Oral daily  losartan 100 milliGRAM(s) Oral daily  memantine 10 milliGRAM(s) Oral two times a day  metFORMIN 500 milliGRAM(s) Oral at bedtime  modafinil 400 milliGRAM(s) Oral daily  pantoprazole    Tablet 40 milliGRAM(s) Oral before breakfast  polyethylene glycol 3350 17 Gram(s) Oral two times a day  pregabalin 100 milliGRAM(s) Oral three times a day  senna 2 Tablet(s) Oral at bedtime  sucralfate 1 Gram(s) Oral four times a day    MEDICATIONS  (PRN):  acetaminophen     Tablet .. 650 milliGRAM(s) Oral every 6 hours PRN Temp greater or equal to 38C (100.4F), Mild Pain (1 - 3)  aluminum hydroxide/magnesium hydroxide/simethicone Suspension 30 milliLiter(s) Oral every 4 hours PRN Dyspepsia  dextrose Oral Gel 15 Gram(s) Oral once PRN Blood Glucose LESS THAN 70 milliGRAM(s)/deciliter  guaiFENesin Oral Liquid (Sugar-Free) 100 milliGRAM(s) Oral every 6 hours PRN Cough  magnesium hydroxide Suspension 30 milliLiter(s) Oral daily PRN Constipation  melatonin 3 milliGRAM(s) Oral at bedtime PRN Insomnia  ondansetron Injectable 4 milliGRAM(s) IV Push every 6 hours PRN Nausea and/or Vomiting         Vitals log        ICU Vital Signs Last 24 Hrs  T(C): 36.6 (19 Mar 2023 05:47), Max: 37.4 (18 Mar 2023 14:00)  T(F): 97.8 (19 Mar 2023 05:47), Max: 99.3 (18 Mar 2023 14:00)  HR: 70 (19 Mar 2023 05:47) (70 - 124)  BP: 135/82 (19 Mar 2023 05:47) (128/82 - 135/82)  BP(mean): --  ABP: --  ABP(mean): --  RR: 17 (19 Mar 2023 05:47) (17 - 17)  SpO2: 96% (19 Mar 2023 05:47) (94% - 97%)    O2 Parameters below as of 19 Mar 2023 05:47  Patient On (Oxygen Delivery Method): nasal cannula  O2 Flow (L/min): 2               Input and Output:  I&O's Detail      Lab Data                        10.7   9.39  )-----------( 250      ( 17 Mar 2023 08:28 )             33.8     03-17    143  |  110<H>  |  21  ----------------------------<  112<H>  3.4<L>   |  28  |  0.74    Ca    9.2      17 Mar 2023 08:28  Mg     2.2     03-17              Review of Systems	      Objective     Physical Examination  heart s1s2  lung dc BS  occ cough        Pertinent Lab findings & Imaging      Dale:  NO   Adequate UO     I&O's Detail           Discussed with:     Cultures:	        Radiology                            
Date/Time Patient Seen:  		  Referring MD:   Data Reviewed	       Patient is a 62y old  Female who presents with a chief complaint of flu-like symptoms (21 Mar 2023 12:33)      Subjective/HPI     PAST MEDICAL & SURGICAL HISTORY:  Raynaud&#x27;s disease    Fibromyalgia    Thyroiditis  hashimottos    Osteoarthritis    Migraine    Cushing&#x27;s syndrome    Reflex sympathetic dystrophy    MVP (mitral valve prolapse)    GERD (gastroesophageal reflux disease)    Cervical radiculopathy    TMJ (dislocation of temporomandibular joint)    Neurogenic bladder    Hypothyroidism    Kidney stone  left    Gastroparesis    Acute kidney injury    Diabetes    S/P cholecystectomy    S/P knee surgery          Medication list         MEDICATIONS  (STANDING):  albuterol    0.083% 2.5 milliGRAM(s) Nebulizer every 6 hours  amLODIPine   Tablet 10 milliGRAM(s) Oral <User Schedule>  atorvastatin 40 milliGRAM(s) Oral at bedtime  celecoxib 200 milliGRAM(s) Oral daily  dextrose 5%. 1000 milliLiter(s) (100 mL/Hr) IV Continuous <Continuous>  dextrose 5%. 1000 milliLiter(s) (50 mL/Hr) IV Continuous <Continuous>  dextrose 50% Injectable 25 Gram(s) IV Push once  dextrose 50% Injectable 12.5 Gram(s) IV Push once  dextrose 50% Injectable 25 Gram(s) IV Push once  enoxaparin Injectable 40 milliGRAM(s) SubCutaneous every 24 hours  glucagon  Injectable 1 milliGRAM(s) IntraMuscular once  guaiFENesin  milliGRAM(s) Oral every 12 hours  hydrocortisone sodium succinate Injectable 50 milliGRAM(s) IV Push every 8 hours  HYDROmorphone   Tablet 4 milliGRAM(s) Oral daily  insulin lispro (ADMELOG) corrective regimen sliding scale   SubCutaneous three times a day before meals  insulin lispro (ADMELOG) corrective regimen sliding scale   SubCutaneous at bedtime  labetalol 400 milliGRAM(s) Oral <User Schedule>  levothyroxine 225 MICROGram(s) Oral daily  losartan 100 milliGRAM(s) Oral daily  memantine 10 milliGRAM(s) Oral two times a day  metFORMIN 500 milliGRAM(s) Oral at bedtime  pantoprazole    Tablet 40 milliGRAM(s) Oral before breakfast  polyethylene glycol 3350 17 Gram(s) Oral two times a day  senna 2 Tablet(s) Oral at bedtime  sucralfate 1 Gram(s) Oral four times a day    MEDICATIONS  (PRN):  acetaminophen     Tablet .. 650 milliGRAM(s) Oral every 6 hours PRN Temp greater or equal to 38C (100.4F), Mild Pain (1 - 3)  aluminum hydroxide/magnesium hydroxide/simethicone Suspension 30 milliLiter(s) Oral every 4 hours PRN Dyspepsia  bisacodyl 5 milliGRAM(s) Oral every 12 hours PRN Constipation  dextrose Oral Gel 15 Gram(s) Oral once PRN Blood Glucose LESS THAN 70 milliGRAM(s)/deciliter  guaiFENesin Oral Liquid (Sugar-Free) 100 milliGRAM(s) Oral every 6 hours PRN Cough  melatonin 3 milliGRAM(s) Oral at bedtime PRN Insomnia  ondansetron Injectable 4 milliGRAM(s) IV Push every 6 hours PRN Nausea and/or Vomiting         Vitals log        ICU Vital Signs Last 24 Hrs  T(C): 36.4 (21 Mar 2023 20:33), Max: 36.8 (21 Mar 2023 05:31)  T(F): 97.5 (21 Mar 2023 20:33), Max: 98.2 (21 Mar 2023 05:31)  HR: 68 (21 Mar 2023 20:33) (62 - 74)  BP: 112/63 (21 Mar 2023 20:33) (106/74 - 124/77)  BP(mean): --  ABP: --  ABP(mean): --  RR: 18 (21 Mar 2023 20:33) (18 - 18)  SpO2: 96% (21 Mar 2023 20:33) (94% - 98%)    O2 Parameters below as of 21 Mar 2023 20:33  Patient On (Oxygen Delivery Method): nasal cannula  O2 Flow (L/min): 2               Input and Output:  I&O's Detail      Lab Data                        10.0   13.73 )-----------( 236      ( 20 Mar 2023 06:07 )             31.6     03-20    142  |  107  |  22  ----------------------------<  106<H>  3.7   |  30  |  0.73    Ca    9.1      20 Mar 2023 06:07  Mg     2.4     03-20              Review of Systems	      Objective     Physical Examination    heart s1s2  lung dc BS  head nc      Pertinent Lab findings & Imaging      Hunter:  NO   Adequate UO     I&O's Detail           Discussed with:     Cultures:	        Radiology                            
Date/Time Patient Seen:  		  Referring MD:   Data Reviewed	       Patient is a 62y old  Female who presents with a chief complaint of flu-like symptoms (22 Mar 2023 12:17)      Subjective/HPI     PAST MEDICAL & SURGICAL HISTORY:  Raynaud&#x27;s disease    Fibromyalgia    Thyroiditis  hashimottos    Osteoarthritis    Migraine    Cushing&#x27;s syndrome    Reflex sympathetic dystrophy    MVP (mitral valve prolapse)    GERD (gastroesophageal reflux disease)    Cervical radiculopathy    TMJ (dislocation of temporomandibular joint)    Neurogenic bladder    Hypothyroidism    Kidney stone  left    Gastroparesis    Acute kidney injury    Diabetes    S/P cholecystectomy    S/P knee surgery          Medication list         MEDICATIONS  (STANDING):  albuterol    0.083% 2.5 milliGRAM(s) Nebulizer every 6 hours  amLODIPine   Tablet 10 milliGRAM(s) Oral <User Schedule>  atorvastatin 40 milliGRAM(s) Oral at bedtime  celecoxib 200 milliGRAM(s) Oral daily  dextrose 5%. 1000 milliLiter(s) (100 mL/Hr) IV Continuous <Continuous>  dextrose 5%. 1000 milliLiter(s) (50 mL/Hr) IV Continuous <Continuous>  dextrose 50% Injectable 25 Gram(s) IV Push once  dextrose 50% Injectable 12.5 Gram(s) IV Push once  dextrose 50% Injectable 25 Gram(s) IV Push once  enoxaparin Injectable 40 milliGRAM(s) SubCutaneous every 24 hours  glucagon  Injectable 1 milliGRAM(s) IntraMuscular once  guaiFENesin  milliGRAM(s) Oral every 12 hours  hydrocortisone sodium succinate Injectable 50 milliGRAM(s) IV Push every 8 hours  insulin lispro (ADMELOG) corrective regimen sliding scale   SubCutaneous three times a day before meals  insulin lispro (ADMELOG) corrective regimen sliding scale   SubCutaneous at bedtime  labetalol 400 milliGRAM(s) Oral <User Schedule>  levothyroxine 225 MICROGram(s) Oral daily  losartan 100 milliGRAM(s) Oral daily  memantine 10 milliGRAM(s) Oral two times a day  metFORMIN 500 milliGRAM(s) Oral at bedtime  pantoprazole    Tablet 40 milliGRAM(s) Oral before breakfast  polyethylene glycol 3350 17 Gram(s) Oral two times a day  senna 2 Tablet(s) Oral at bedtime  sucralfate 1 Gram(s) Oral four times a day    MEDICATIONS  (PRN):  acetaminophen     Tablet .. 650 milliGRAM(s) Oral every 6 hours PRN Temp greater or equal to 38C (100.4F), Mild Pain (1 - 3)  aluminum hydroxide/magnesium hydroxide/simethicone Suspension 30 milliLiter(s) Oral every 4 hours PRN Dyspepsia  bisacodyl 5 milliGRAM(s) Oral every 12 hours PRN Constipation  dextrose Oral Gel 15 Gram(s) Oral once PRN Blood Glucose LESS THAN 70 milliGRAM(s)/deciliter  guaiFENesin Oral Liquid (Sugar-Free) 100 milliGRAM(s) Oral every 6 hours PRN Cough  melatonin 3 milliGRAM(s) Oral at bedtime PRN Insomnia  ondansetron Injectable 4 milliGRAM(s) IV Push every 6 hours PRN Nausea and/or Vomiting         Vitals log        ICU Vital Signs Last 24 Hrs  T(C): 36.7 (22 Mar 2023 20:09), Max: 36.7 (22 Mar 2023 20:09)  T(F): 98 (22 Mar 2023 20:09), Max: 98 (22 Mar 2023 20:09)  HR: 60 (23 Mar 2023 01:41) (59 - 83)  BP: 133/77 (22 Mar 2023 20:09) (95/56 - 133/77)  BP(mean): 69 (22 Mar 2023 15:20) (69 - 69)  ABP: --  ABP(mean): --  RR: 18 (22 Mar 2023 20:09) (18 - 18)  SpO2: 98% (23 Mar 2023 01:41) (94% - 98%)    O2 Parameters below as of 23 Mar 2023 01:41  Patient On (Oxygen Delivery Method): nasal cannula,3                 Input and Output:  I&O's Detail      Lab Data                  Review of Systems	      Objective     Physical Examination    heart s1s2  lung dc bS  head nc      Pertinent Lab findings & Imaging      Hunter:  NO   Adequate UO     I&O's Detail           Discussed with:     Cultures:	        Radiology                            
Date/Time Patient Seen:  		  Referring MD:   Data Reviewed	       Patient is a 62y old  Female who presents with a chief complaint of flu-like symptoms (15 Mar 2023 06:46)      Subjective/HPI     PAST MEDICAL & SURGICAL HISTORY:  Raynaud&#x27;s disease    Fibromyalgia    Thyroiditis  hashimottos    Osteoarthritis    Migraine    Cushing&#x27;s syndrome    Reflex sympathetic dystrophy    MVP (mitral valve prolapse)    GERD (gastroesophageal reflux disease)    Cervical radiculopathy    TMJ (dislocation of temporomandibular joint)    Neurogenic bladder    Hypothyroidism    Kidney stone  left    Gastroparesis    Acute kidney injury    Diabetes    S/P cholecystectomy    S/P knee surgery          Medication list         MEDICATIONS  (STANDING):  albuterol    0.083% 2.5 milliGRAM(s) Nebulizer every 6 hours  atorvastatin 40 milliGRAM(s) Oral at bedtime  celecoxib 200 milliGRAM(s) Oral daily  clonazePAM  Tablet 0.5 milliGRAM(s) Oral daily  dextrose 5%. 1000 milliLiter(s) (100 mL/Hr) IV Continuous <Continuous>  dextrose 5%. 1000 milliLiter(s) (50 mL/Hr) IV Continuous <Continuous>  dextrose 50% Injectable 25 Gram(s) IV Push once  dextrose 50% Injectable 12.5 Gram(s) IV Push once  dextrose 50% Injectable 25 Gram(s) IV Push once  enoxaparin Injectable 40 milliGRAM(s) SubCutaneous every 24 hours  fentaNYL   Patch  25 MICROgram(s)/Hr. 1 Patch Transdermal every 48 hours  glucagon  Injectable 1 milliGRAM(s) IntraMuscular once  guaiFENesin  milliGRAM(s) Oral every 12 hours  hydrocortisone sodium succinate Injectable 50 milliGRAM(s) IV Push every 8 hours  HYDROmorphone   Tablet 4 milliGRAM(s) Oral daily  insulin lispro (ADMELOG) corrective regimen sliding scale   SubCutaneous three times a day before meals  insulin lispro (ADMELOG) corrective regimen sliding scale   SubCutaneous at bedtime  levothyroxine 225 MICROGram(s) Oral daily  memantine 10 milliGRAM(s) Oral two times a day  metFORMIN 500 milliGRAM(s) Oral at bedtime  modafinil 400 milliGRAM(s) Oral daily  pantoprazole    Tablet 40 milliGRAM(s) Oral before breakfast  pregabalin 100 milliGRAM(s) Oral three times a day  sucralfate 1 Gram(s) Oral four times a day    MEDICATIONS  (PRN):  acetaminophen     Tablet .. 650 milliGRAM(s) Oral every 6 hours PRN Temp greater or equal to 38C (100.4F), Mild Pain (1 - 3)  aluminum hydroxide/magnesium hydroxide/simethicone Suspension 30 milliLiter(s) Oral every 4 hours PRN Dyspepsia  dextrose Oral Gel 15 Gram(s) Oral once PRN Blood Glucose LESS THAN 70 milliGRAM(s)/deciliter  guaiFENesin Oral Liquid (Sugar-Free) 100 milliGRAM(s) Oral every 6 hours PRN Cough  melatonin 3 milliGRAM(s) Oral at bedtime PRN Insomnia  ondansetron Injectable 4 milliGRAM(s) IV Push every 6 hours PRN Nausea and/or Vomiting         Vitals log        ICU Vital Signs Last 24 Hrs  T(C): 37 (15 Mar 2023 20:50), Max: 37 (15 Mar 2023 20:50)  T(F): 98.6 (15 Mar 2023 20:50), Max: 98.6 (15 Mar 2023 20:50)  HR: 115 (15 Mar 2023 20:50) (100 - 115)  BP: 122/76 (15 Mar 2023 20:50) (122/76 - 134/77)  BP(mean): --  ABP: --  ABP(mean): --  RR: 19 (15 Mar 2023 20:50) (18 - 19)  SpO2: 98% (15 Mar 2023 20:50) (97% - 98%)    O2 Parameters below as of 15 Mar 2023 20:50  Patient On (Oxygen Delivery Method): nasal cannula                 Input and Output:  I&O's Detail      Lab Data                        10.5   8.28  )-----------( 203      ( 15 Mar 2023 07:10 )             33.0     03-15    141  |  110<H>  |  23  ----------------------------<  165<H>  4.1   |  27  |  0.70    Ca    8.7      15 Mar 2023 07:10  Mg     2.0     03-15    TPro  6.6  /  Alb  3.0<L>  /  TBili  0.6  /  DBili  0.2  /  AST  41<H>  /  ALT  86<H>  /  AlkPhos  210<H>  03-15            Review of Systems	      Objective     Physical Examination    heart s1s2  lung dec bS      Pertinent Lab findings & Imaging      Hunter:  NO   Adequate UO     I&O's Detail           Discussed with:     Cultures:	        Radiology                            
Date/Time Patient Seen:  		  Referring MD:   Data Reviewed	       Patient is a 62y old  Female who presents with a chief complaint of flu-like symptoms (16 Mar 2023 10:26)      Subjective/HPI     PAST MEDICAL & SURGICAL HISTORY:  Raynaud&#x27;s disease    Fibromyalgia    Thyroiditis  hashimottos    Osteoarthritis    Migraine    Cushing&#x27;s syndrome    Reflex sympathetic dystrophy    MVP (mitral valve prolapse)    GERD (gastroesophageal reflux disease)    Cervical radiculopathy    TMJ (dislocation of temporomandibular joint)    Neurogenic bladder    Hypothyroidism    Kidney stone  left    Gastroparesis    Acute kidney injury    Diabetes    S/P cholecystectomy    S/P knee surgery          Medication list         MEDICATIONS  (STANDING):  albuterol    0.083% 2.5 milliGRAM(s) Nebulizer every 6 hours  atorvastatin 40 milliGRAM(s) Oral at bedtime  celecoxib 200 milliGRAM(s) Oral daily  clonazePAM  Tablet 0.5 milliGRAM(s) Oral daily  dextrose 5%. 1000 milliLiter(s) (50 mL/Hr) IV Continuous <Continuous>  dextrose 5%. 1000 milliLiter(s) (100 mL/Hr) IV Continuous <Continuous>  dextrose 50% Injectable 25 Gram(s) IV Push once  dextrose 50% Injectable 12.5 Gram(s) IV Push once  dextrose 50% Injectable 25 Gram(s) IV Push once  enoxaparin Injectable 40 milliGRAM(s) SubCutaneous every 24 hours  fentaNYL   Patch  25 MICROgram(s)/Hr. 1 Patch Transdermal every 48 hours  glucagon  Injectable 1 milliGRAM(s) IntraMuscular once  guaiFENesin  milliGRAM(s) Oral every 12 hours  hydrocortisone sodium succinate Injectable 50 milliGRAM(s) IV Push every 8 hours  HYDROmorphone   Tablet 4 milliGRAM(s) Oral daily  insulin lispro (ADMELOG) corrective regimen sliding scale   SubCutaneous three times a day before meals  insulin lispro (ADMELOG) corrective regimen sliding scale   SubCutaneous at bedtime  levothyroxine 225 MICROGram(s) Oral daily  memantine 10 milliGRAM(s) Oral two times a day  metFORMIN 500 milliGRAM(s) Oral at bedtime  modafinil 400 milliGRAM(s) Oral daily  pantoprazole    Tablet 40 milliGRAM(s) Oral before breakfast  pregabalin 100 milliGRAM(s) Oral three times a day  sucralfate 1 Gram(s) Oral four times a day    MEDICATIONS  (PRN):  acetaminophen     Tablet .. 650 milliGRAM(s) Oral every 6 hours PRN Temp greater or equal to 38C (100.4F), Mild Pain (1 - 3)  aluminum hydroxide/magnesium hydroxide/simethicone Suspension 30 milliLiter(s) Oral every 4 hours PRN Dyspepsia  dextrose Oral Gel 15 Gram(s) Oral once PRN Blood Glucose LESS THAN 70 milliGRAM(s)/deciliter  guaiFENesin Oral Liquid (Sugar-Free) 100 milliGRAM(s) Oral every 6 hours PRN Cough  melatonin 3 milliGRAM(s) Oral at bedtime PRN Insomnia  ondansetron Injectable 4 milliGRAM(s) IV Push every 6 hours PRN Nausea and/or Vomiting         Vitals log        ICU Vital Signs Last 24 Hrs  T(C): 36.9 (16 Mar 2023 22:06), Max: 36.9 (16 Mar 2023 12:57)  T(F): 98.5 (16 Mar 2023 22:06), Max: 98.5 (16 Mar 2023 22:06)  HR: 98 (17 Mar 2023 00:22) (86 - 103)  BP: 152/80 (16 Mar 2023 22:06) (149/79 - 152/80)  BP(mean): --  ABP: --  ABP(mean): --  RR: 17 (16 Mar 2023 22:06) (14 - 17)  SpO2: 98% (17 Mar 2023 00:22) (97% - 100%)    O2 Parameters below as of 17 Mar 2023 00:22  Patient On (Oxygen Delivery Method): nasal cannula                 Input and Output:  I&O's Detail    16 Mar 2023 07:01  -  17 Mar 2023 05:28  --------------------------------------------------------  IN:    Oral Fluid: 180 mL  Total IN: 180 mL    OUT:  Total OUT: 0 mL    Total NET: 180 mL          Lab Data                        11.2   13.21 )-----------( 243      ( 16 Mar 2023 07:40 )             34.6     03-16    143  |  109<H>  |  22  ----------------------------<  131<H>  3.5   |  28  |  0.86    Ca    9.2      16 Mar 2023 07:40  Mg     2.1     03-16    TPro  6.6  /  Alb  3.0<L>  /  TBili  0.6  /  DBili  0.2  /  AST  41<H>  /  ALT  86<H>  /  AlkPhos  210<H>  03-15            Review of Systems	      Objective     Physical Examination    heart s1s2  lung dc BS      Pertinent Lab findings & Imaging      Dale:  NO   Adequate UO     I&O's Detail    16 Mar 2023 07:01  -  17 Mar 2023 05:28  --------------------------------------------------------  IN:    Oral Fluid: 180 mL  Total IN: 180 mL    OUT:  Total OUT: 0 mL    Total NET: 180 mL               Discussed with:     Cultures:	        Radiology                            
Date/Time Patient Seen:  		  Referring MD:   Data Reviewed	       Patient is a 62y old  Female who presents with a chief complaint of flu-like symptoms (17 Mar 2023 14:26)      Subjective/HPI     PAST MEDICAL & SURGICAL HISTORY:  Raynaud&#x27;s disease    Fibromyalgia    Thyroiditis  hashimottos    Osteoarthritis    Migraine    Cushing&#x27;s syndrome    Reflex sympathetic dystrophy    MVP (mitral valve prolapse)    GERD (gastroesophageal reflux disease)    Cervical radiculopathy    TMJ (dislocation of temporomandibular joint)    Neurogenic bladder    Hypothyroidism    Kidney stone  left    Gastroparesis    Acute kidney injury    Diabetes    S/P cholecystectomy    S/P knee surgery          Medication list         MEDICATIONS  (STANDING):  albuterol    0.083% 2.5 milliGRAM(s) Nebulizer every 6 hours  atorvastatin 40 milliGRAM(s) Oral at bedtime  celecoxib 200 milliGRAM(s) Oral daily  clonazePAM  Tablet 0.5 milliGRAM(s) Oral daily  dextrose 5%. 1000 milliLiter(s) (50 mL/Hr) IV Continuous <Continuous>  dextrose 5%. 1000 milliLiter(s) (100 mL/Hr) IV Continuous <Continuous>  dextrose 50% Injectable 25 Gram(s) IV Push once  dextrose 50% Injectable 12.5 Gram(s) IV Push once  dextrose 50% Injectable 25 Gram(s) IV Push once  enoxaparin Injectable 40 milliGRAM(s) SubCutaneous every 24 hours  fentaNYL   Patch  25 MICROgram(s)/Hr. 1 Patch Transdermal every 48 hours  glucagon  Injectable 1 milliGRAM(s) IntraMuscular once  guaiFENesin  milliGRAM(s) Oral every 12 hours  hydrocortisone sodium succinate Injectable 50 milliGRAM(s) IV Push every 8 hours  HYDROmorphone   Tablet 4 milliGRAM(s) Oral daily  insulin lispro (ADMELOG) corrective regimen sliding scale   SubCutaneous three times a day before meals  insulin lispro (ADMELOG) corrective regimen sliding scale   SubCutaneous at bedtime  levothyroxine 225 MICROGram(s) Oral daily  losartan 100 milliGRAM(s) Oral daily  memantine 10 milliGRAM(s) Oral two times a day  metFORMIN 500 milliGRAM(s) Oral at bedtime  modafinil 400 milliGRAM(s) Oral daily  pantoprazole    Tablet 40 milliGRAM(s) Oral before breakfast  polyethylene glycol 3350 17 Gram(s) Oral two times a day  pregabalin 100 milliGRAM(s) Oral three times a day  senna 2 Tablet(s) Oral at bedtime  sucralfate 1 Gram(s) Oral four times a day    MEDICATIONS  (PRN):  acetaminophen     Tablet .. 650 milliGRAM(s) Oral every 6 hours PRN Temp greater or equal to 38C (100.4F), Mild Pain (1 - 3)  aluminum hydroxide/magnesium hydroxide/simethicone Suspension 30 milliLiter(s) Oral every 4 hours PRN Dyspepsia  dextrose Oral Gel 15 Gram(s) Oral once PRN Blood Glucose LESS THAN 70 milliGRAM(s)/deciliter  guaiFENesin Oral Liquid (Sugar-Free) 100 milliGRAM(s) Oral every 6 hours PRN Cough  magnesium hydroxide Suspension 30 milliLiter(s) Oral daily PRN Constipation  melatonin 3 milliGRAM(s) Oral at bedtime PRN Insomnia  ondansetron Injectable 4 milliGRAM(s) IV Push every 6 hours PRN Nausea and/or Vomiting         Vitals log        ICU Vital Signs Last 24 Hrs  T(C): 37.1 (17 Mar 2023 20:50), Max: 37.1 (17 Mar 2023 20:50)  T(F): 98.7 (17 Mar 2023 20:50), Max: 98.7 (17 Mar 2023 20:50)  HR: 105 (17 Mar 2023 20:50) (98 - 105)  BP: 134/90 (17 Mar 2023 20:50) (134/90 - 134/90)  BP(mean): --  ABP: --  ABP(mean): --  RR: 17 (17 Mar 2023 20:50) (17 - 18)  SpO2: 95% (17 Mar 2023 20:50) (95% - 98%)    O2 Parameters below as of 17 Mar 2023 20:50  Patient On (Oxygen Delivery Method): nasal cannula                 Input and Output:  I&O's Detail    16 Mar 2023 07:01  -  17 Mar 2023 07:00  --------------------------------------------------------  IN:    Oral Fluid: 180 mL  Total IN: 180 mL    OUT:  Total OUT: 0 mL    Total NET: 180 mL          Lab Data                        10.7   9.39  )-----------( 250      ( 17 Mar 2023 08:28 )             33.8     03-17    143  |  110<H>  |  21  ----------------------------<  112<H>  3.4<L>   |  28  |  0.74    Ca    9.2      17 Mar 2023 08:28  Mg     2.2     03-17              Review of Systems	      Objective     Physical Examination    heart s1s2  lung dc BS  head nc    Pertinent Lab findings & Imaging      Dale:  NO   Adequate UO     I&O's Detail    16 Mar 2023 07:01  -  17 Mar 2023 07:00  --------------------------------------------------------  IN:    Oral Fluid: 180 mL  Total IN: 180 mL    OUT:  Total OUT: 0 mL    Total NET: 180 mL               Discussed with:     Cultures:	        Radiology                            
Patient is a 62y old  Female who presents with a chief complaint of flu-like symptoms (22 Mar 2023 05:00)      INTERVAL HPI/OVERNIGHT EVENTS: Patient seen and examined. NAD. No complaints.    Vital Signs Last 24 Hrs  T(C): 36.7 (22 Mar 2023 04:50), Max: 36.7 (22 Mar 2023 04:50)  T(F): 98 (22 Mar 2023 04:50), Max: 98 (22 Mar 2023 04:50)  HR: 69 (22 Mar 2023 09:41) (59 - 77)  BP: 117/70 (22 Mar 2023 09:41) (106/74 - 117/70)  BP(mean): --  RR: 18 (22 Mar 2023 09:41) (18 - 18)  SpO2: 96% (22 Mar 2023 09:41) (93% - 98%)    Parameters below as of 22 Mar 2023 09:41  Patient On (Oxygen Delivery Method): room air                  CAPILLARY BLOOD GLUCOSE      POCT Blood Glucose.: 117 mg/dL (22 Mar 2023 11:37)  POCT Blood Glucose.: 104 mg/dL (22 Mar 2023 07:45)  POCT Blood Glucose.: 207 mg/dL (21 Mar 2023 21:01)  POCT Blood Glucose.: 109 mg/dL (21 Mar 2023 16:39)              acetaminophen     Tablet .. 650 milliGRAM(s) Oral every 6 hours PRN  albuterol    0.083% 2.5 milliGRAM(s) Nebulizer every 6 hours  aluminum hydroxide/magnesium hydroxide/simethicone Suspension 30 milliLiter(s) Oral every 4 hours PRN  amLODIPine   Tablet 10 milliGRAM(s) Oral <User Schedule>  atorvastatin 40 milliGRAM(s) Oral at bedtime  bisacodyl 5 milliGRAM(s) Oral every 12 hours PRN  celecoxib 200 milliGRAM(s) Oral daily  dextrose 5%. 1000 milliLiter(s) IV Continuous <Continuous>  dextrose 5%. 1000 milliLiter(s) IV Continuous <Continuous>  dextrose 50% Injectable 25 Gram(s) IV Push once  dextrose 50% Injectable 12.5 Gram(s) IV Push once  dextrose 50% Injectable 25 Gram(s) IV Push once  dextrose Oral Gel 15 Gram(s) Oral once PRN  enoxaparin Injectable 40 milliGRAM(s) SubCutaneous every 24 hours  glucagon  Injectable 1 milliGRAM(s) IntraMuscular once  guaiFENesin  milliGRAM(s) Oral every 12 hours  guaiFENesin Oral Liquid (Sugar-Free) 100 milliGRAM(s) Oral every 6 hours PRN  hydrocortisone sodium succinate Injectable 50 milliGRAM(s) IV Push every 8 hours  insulin lispro (ADMELOG) corrective regimen sliding scale   SubCutaneous three times a day before meals  insulin lispro (ADMELOG) corrective regimen sliding scale   SubCutaneous at bedtime  labetalol 400 milliGRAM(s) Oral <User Schedule>  levothyroxine 225 MICROGram(s) Oral daily  losartan 100 milliGRAM(s) Oral daily  melatonin 3 milliGRAM(s) Oral at bedtime PRN  memantine 10 milliGRAM(s) Oral two times a day  metFORMIN 500 milliGRAM(s) Oral at bedtime  ondansetron Injectable 4 milliGRAM(s) IV Push every 6 hours PRN  pantoprazole    Tablet 40 milliGRAM(s) Oral before breakfast  polyethylene glycol 3350 17 Gram(s) Oral two times a day  senna 2 Tablet(s) Oral at bedtime  sucralfate 1 Gram(s) Oral four times a day              REVIEW OF SYSTEMS:  CONSTITUTIONAL: No fever, no weight loss, or no fatigue  NECK: No pain, no stiffness  RESPIRATORY: No cough, no wheezing, no chills, no hemoptysis, No shortness of breath  CARDIOVASCULAR: No chest pain, no palpitations, no dizziness, no leg swelling  GASTROINTESTINAL: No abdominal pain. No nausea, no vomiting, no hematemesis; No diarrhea, no constipation. No melena, no hematochezia.  GENITOURINARY: No dysuria, no frequency, no hematuria, no incontinence  NEUROLOGICAL: No headaches, no loss of strength, no numbness, no tremors  SKIN: No itching, no burning  MUSCULOSKELETAL: No joint pain, no swelling; No muscle, no back, no extremity pain  PSYCHIATRIC: No depression, no mood swings,   HEME/LYMPH: No easy bruising, no bleeding gums  ALLERY AND IMMUNOLOGIC: No hives       Consultant(s) Notes Reviewed:  [X] YES  [ ] NO    PHYSICAL EXAM:  GENERAL: NAD  HEAD:  Atraumatic, Normocephalic  EYES: EOMI, PERRLA, conjunctiva and sclera clear  ENMT: No tonsillar erythema, exudates, or enlargement; Moist mucous membranes  NECK: Supple, No JVD  NERVOUS SYSTEM:  Awake & alert  CHEST/LUNG: Clear to auscultation bilaterally; No rales, rhonchi, wheezing,  HEART: Regular rate and rhythm  ABDOMEN: Soft, Nontender, Nondistended; Bowel sounds present  EXTREMITIES:  No clubbing, cyanosis, or edema  LYMPH: No lymphadenopathy noted  SKIN: No rashes      Advanced care planning discussed with patient/family [X] YES   [ ] NO    Advanced care planning discussed with patient/family. Patient's health status was discussed. All appropriate changes have been made regarding patient's end-of-life care. Advanced care planning forms reviewed/discussed/completed.  20 minutes spent.   
Patient is a 62y old  Female who presents with a chief complaint of flu-like symptoms (16 Mar 2023 05:25)      INTERVAL HPI/OVERNIGHT EVENTS: Patient seen and examined. NAD. + cough    Vital Signs Last 24 Hrs  T(C): 36.6 (16 Mar 2023 04:38), Max: 37 (15 Mar 2023 20:50)  T(F): 97.9 (16 Mar 2023 04:38), Max: 98.6 (15 Mar 2023 20:50)  HR: 86 (16 Mar 2023 07:46) (86 - 115)  BP: 121/66 (16 Mar 2023 04:38) (121/66 - 134/77)  BP(mean): --  RR: 18 (16 Mar 2023 04:38) (18 - 19)  SpO2: 100% (16 Mar 2023 07:46) (97% - 100%)    Parameters below as of 16 Mar 2023 08:54  Patient On (Oxygen Delivery Method): nasal cannula,3L N/C        03-16    143  |  109<H>  |  22  ----------------------------<  131<H>  3.5   |  28  |  0.86    Ca    9.2      16 Mar 2023 07:40  Mg     2.1     03-16    TPro  6.6  /  Alb  3.0<L>  /  TBili  0.6  /  DBili  0.2  /  AST  41<H>  /  ALT  86<H>  /  AlkPhos  210<H>  -15                          11.2   13.21 )-----------( 243      ( 16 Mar 2023 07:40 )             34.6     PT/INR - ( 14 Mar 2023 18:20 )   PT: 12.5 sec;   INR: 1.07 ratio         PTT - ( 14 Mar 2023 18:20 )  PTT:31.9 sec  CAPILLARY BLOOD GLUCOSE      POCT Blood Glucose.: 133 mg/dL (16 Mar 2023 07:50)  POCT Blood Glucose.: 173 mg/dL (15 Mar 2023 21:33)  POCT Blood Glucose.: 181 mg/dL (15 Mar 2023 16:42)  POCT Blood Glucose.: 191 mg/dL (15 Mar 2023 11:45)    Urinalysis Basic - ( 15 Mar 2023 11:33 )    Color: Yellow / Appearance: Clear / S.015 / pH: x  Gluc: x / Ketone: Trace  / Bili: Negative / Urobili: Negative   Blood: x / Protein: 15 / Nitrite: Negative   Leuk Esterase: Negative / RBC: 0-2 /HPF / WBC 0-2   Sq Epi: x / Non Sq Epi: Occasional / Bacteria: Occasional              acetaminophen     Tablet .. 650 milliGRAM(s) Oral every 6 hours PRN  albuterol    0.083% 2.5 milliGRAM(s) Nebulizer every 6 hours  aluminum hydroxide/magnesium hydroxide/simethicone Suspension 30 milliLiter(s) Oral every 4 hours PRN  atorvastatin 40 milliGRAM(s) Oral at bedtime  celecoxib 200 milliGRAM(s) Oral daily  clonazePAM  Tablet 0.5 milliGRAM(s) Oral daily  dextrose 5%. 1000 milliLiter(s) IV Continuous <Continuous>  dextrose 5%. 1000 milliLiter(s) IV Continuous <Continuous>  dextrose 50% Injectable 25 Gram(s) IV Push once  dextrose 50% Injectable 12.5 Gram(s) IV Push once  dextrose 50% Injectable 25 Gram(s) IV Push once  dextrose Oral Gel 15 Gram(s) Oral once PRN  enoxaparin Injectable 40 milliGRAM(s) SubCutaneous every 24 hours  fentaNYL   Patch  25 MICROgram(s)/Hr. 1 Patch Transdermal every 48 hours  glucagon  Injectable 1 milliGRAM(s) IntraMuscular once  guaiFENesin  milliGRAM(s) Oral every 12 hours  guaiFENesin Oral Liquid (Sugar-Free) 100 milliGRAM(s) Oral every 6 hours PRN  hydrocortisone sodium succinate Injectable 50 milliGRAM(s) IV Push every 8 hours  HYDROmorphone   Tablet 4 milliGRAM(s) Oral daily  insulin lispro (ADMELOG) corrective regimen sliding scale   SubCutaneous three times a day before meals  insulin lispro (ADMELOG) corrective regimen sliding scale   SubCutaneous at bedtime  levothyroxine 225 MICROGram(s) Oral daily  melatonin 3 milliGRAM(s) Oral at bedtime PRN  memantine 10 milliGRAM(s) Oral two times a day  metFORMIN 500 milliGRAM(s) Oral at bedtime  modafinil 400 milliGRAM(s) Oral daily  ondansetron Injectable 4 milliGRAM(s) IV Push every 6 hours PRN  pantoprazole    Tablet 40 milliGRAM(s) Oral before breakfast  pregabalin 100 milliGRAM(s) Oral three times a day  sucralfate 1 Gram(s) Oral four times a day              REVIEW OF SYSTEMS:  CONSTITUTIONAL: No fever, no weight loss, or + fatigue  NECK: No pain, no stiffness  RESPIRATORY: + cough, no wheezing, no chills, no hemoptysis, No shortness of breath  CARDIOVASCULAR: No chest pain, no palpitations, no dizziness, no leg swelling  GASTROINTESTINAL: No abdominal pain. No nausea, no vomiting, no hematemesis; No diarrhea, no constipation. No melena, no hematochezia.  GENITOURINARY: No dysuria, no frequency, no hematuria, no incontinence  NEUROLOGICAL: No headaches, no loss of strength, no numbness, no tremors  SKIN: No itching, no burning  MUSCULOSKELETAL: No joint pain, no swelling; No muscle, no back, no extremity pain  PSYCHIATRIC: No depression, no mood swings,   HEME/LYMPH: No easy bruising, no bleeding gums  ALLERY AND IMMUNOLOGIC: No hives       Consultant(s) Notes Reviewed:  [X] YES  [ ] NO    PHYSICAL EXAM:  GENERAL: NAD  HEAD:  Atraumatic, Normocephalic  EYES: EOMI, PERRLA, conjunctiva and sclera clear  ENMT: No tonsillar erythema, exudates, or enlargement; Moist mucous membranes  NECK: Supple, No JVD  NERVOUS SYSTEM:  Awake & alert  CHEST/LUNG: scattered ronchi  HEART: Regular rate and rhythm  ABDOMEN: Soft, Nontender, Nondistended; Bowel sounds present  EXTREMITIES:  No clubbing, cyanosis, or edema  LYMPH: No lymphadenopathy noted  SKIN: No rashes      Advanced care planning discussed with patient/family [X] YES   [ ] NO    Advanced care planning discussed with patient/family. Patient's health status was discussed. All appropriate changes have been made regarding patient's end-of-life care. Advanced care planning forms reviewed/discussed/completed.  20 minutes spent.   
Patient is a 62y old  Female who presents with a chief complaint of flu-like symptoms (23 Mar 2023 05:00)      INTERVAL HPI/OVERNIGHT EVENTS: Patient seen and examined. NAD. No complaints.    Vital Signs Last 24 Hrs  T(C): 36.5 (23 Mar 2023 05:09), Max: 36.7 (22 Mar 2023 20:09)  T(F): 97.7 (23 Mar 2023 05:09), Max: 98 (22 Mar 2023 20:09)  HR: 63 (23 Mar 2023 08:27) (60 - 83)  BP: 155/81 (23 Mar 2023 08:27) (95/56 - 155/81)  BP(mean): 69 (22 Mar 2023 15:20) (69 - 69)  RR: 17 (23 Mar 2023 08:27) (17 - 18)  SpO2: 94% (23 Mar 2023 08:27) (94% - 98%)    Parameters below as of 23 Mar 2023 08:27  Patient On (Oxygen Delivery Method): nasal cannula  O2 Flow (L/min): 2                CAPILLARY BLOOD GLUCOSE      POCT Blood Glucose.: 109 mg/dL (23 Mar 2023 08:00)  POCT Blood Glucose.: 116 mg/dL (22 Mar 2023 20:45)  POCT Blood Glucose.: 133 mg/dL (22 Mar 2023 16:47)  POCT Blood Glucose.: 117 mg/dL (22 Mar 2023 11:37)              acetaminophen     Tablet .. 650 milliGRAM(s) Oral every 6 hours PRN  albuterol    0.083% 2.5 milliGRAM(s) Nebulizer every 6 hours  aluminum hydroxide/magnesium hydroxide/simethicone Suspension 30 milliLiter(s) Oral every 4 hours PRN  amLODIPine   Tablet 10 milliGRAM(s) Oral <User Schedule>  atorvastatin 40 milliGRAM(s) Oral at bedtime  bisacodyl 5 milliGRAM(s) Oral every 12 hours PRN  celecoxib 200 milliGRAM(s) Oral daily  dextrose 5%. 1000 milliLiter(s) IV Continuous <Continuous>  dextrose 5%. 1000 milliLiter(s) IV Continuous <Continuous>  dextrose 50% Injectable 25 Gram(s) IV Push once  dextrose 50% Injectable 12.5 Gram(s) IV Push once  dextrose 50% Injectable 25 Gram(s) IV Push once  dextrose Oral Gel 15 Gram(s) Oral once PRN  enoxaparin Injectable 40 milliGRAM(s) SubCutaneous every 24 hours  fentaNYL   Patch  25 MICROgram(s)/Hr. 1 Patch Transdermal every 48 hours  glucagon  Injectable 1 milliGRAM(s) IntraMuscular once  guaiFENesin  milliGRAM(s) Oral every 12 hours  guaiFENesin Oral Liquid (Sugar-Free) 100 milliGRAM(s) Oral every 6 hours PRN  hydrocortisone sodium succinate Injectable 50 milliGRAM(s) IV Push every 8 hours  insulin lispro (ADMELOG) corrective regimen sliding scale   SubCutaneous three times a day before meals  insulin lispro (ADMELOG) corrective regimen sliding scale   SubCutaneous at bedtime  labetalol 400 milliGRAM(s) Oral <User Schedule>  levothyroxine 225 MICROGram(s) Oral daily  losartan 100 milliGRAM(s) Oral daily  melatonin 3 milliGRAM(s) Oral at bedtime PRN  memantine 10 milliGRAM(s) Oral two times a day  metFORMIN 500 milliGRAM(s) Oral at bedtime  ondansetron Injectable 4 milliGRAM(s) IV Push every 6 hours PRN  pantoprazole    Tablet 40 milliGRAM(s) Oral before breakfast  polyethylene glycol 3350 17 Gram(s) Oral two times a day  senna 2 Tablet(s) Oral at bedtime  sucralfate 1 Gram(s) Oral four times a day              REVIEW OF SYSTEMS:  CONSTITUTIONAL: No fever, no weight loss, or no fatigue  NECK: No pain, no stiffness  RESPIRATORY: No cough, no wheezing, no chills, no hemoptysis, No shortness of breath  CARDIOVASCULAR: No chest pain, no palpitations, no dizziness, no leg swelling  GASTROINTESTINAL: No abdominal pain. No nausea, no vomiting, no hematemesis; No diarrhea, no constipation. No melena, no hematochezia.  GENITOURINARY: No dysuria, no frequency, no hematuria, no incontinence  NEUROLOGICAL: No headaches, no loss of strength, no numbness, no tremors  SKIN: No itching, no burning  MUSCULOSKELETAL: No joint pain, no swelling; No muscle, no back, no extremity pain  PSYCHIATRIC: No depression, no mood swings,   HEME/LYMPH: No easy bruising, no bleeding gums  ALLERY AND IMMUNOLOGIC: No hives       Consultant(s) Notes Reviewed:  [X] YES  [ ] NO    PHYSICAL EXAM:  GENERAL: NAD  HEAD:  Atraumatic, Normocephalic  EYES: EOMI, PERRLA, conjunctiva and sclera clear  ENMT: No tonsillar erythema, exudates, or enlargement; Moist mucous membranes  NECK: Supple, No JVD  NERVOUS SYSTEM:  Awake & alert  CHEST/LUNG: Clear to auscultation bilaterally; No rales, rhonchi, wheezing,  HEART: Regular rate and rhythm  ABDOMEN: Soft, Nontender, Nondistended; Bowel sounds present  EXTREMITIES:  No clubbing, cyanosis, or edema  LYMPH: No lymphadenopathy noted  SKIN: No rashes      Advanced care planning discussed with patient/family [X] YES   [ ] NO    Advanced care planning discussed with patient/family. Patient's health status was discussed. All appropriate changes have been made regarding patient's end-of-life care. Advanced care planning forms reviewed/discussed/completed.  20 minutes spent.   
Patient is a 62y old  Female who presents with a chief complaint of flu-like symptoms (16 Mar 2023 05:25)      INTERVAL HPI/OVERNIGHT EVENTS: Patient seen and examined. NAD. + cough    Vital Signs Last 24 Hrs  T(C): 36.4 (18 Mar 2023 05:36), Max: 37.1 (17 Mar 2023 20:50)  T(F): 97.5 (18 Mar 2023 05:36), Max: 98.7 (17 Mar 2023 20:50)  HR: 79 (18 Mar 2023 07:47) (79 - 105)  BP: 162/94 (18 Mar 2023 05:36) (134/90 - 162/94)  BP(mean): --  RR: 18 (18 Mar 2023 05:36) (17 - 18)  SpO2: 97% (18 Mar 2023 07:47) (93% - 97%)    Parameters below as of 18 Mar 2023 07:47  Patient On (Oxygen Delivery Method): nasal cannula,2lpm        03-17    143  |  110<H>  |  21  ----------------------------<  112<H>  3.4<L>   |  28  |  0.74    Ca    9.2      17 Mar 2023 08:28  Mg     2.2     03-17                            10.7   9.39  )-----------( 250      ( 17 Mar 2023 08:28 )             33.8       CAPILLARY BLOOD GLUCOSE      POCT Blood Glucose.: 135 mg/dL (18 Mar 2023 11:33)  POCT Blood Glucose.: 124 mg/dL (18 Mar 2023 07:58)  POCT Blood Glucose.: 160 mg/dL (17 Mar 2023 22:24)  POCT Blood Glucose.: 147 mg/dL (17 Mar 2023 16:52)              acetaminophen     Tablet .. 650 milliGRAM(s) Oral every 6 hours PRN  albuterol    0.083% 2.5 milliGRAM(s) Nebulizer every 6 hours  aluminum hydroxide/magnesium hydroxide/simethicone Suspension 30 milliLiter(s) Oral every 4 hours PRN  amLODIPine   Tablet 10 milliGRAM(s) Oral <User Schedule>  atorvastatin 40 milliGRAM(s) Oral at bedtime  celecoxib 200 milliGRAM(s) Oral daily  clonazePAM  Tablet 0.5 milliGRAM(s) Oral daily  dextrose 5%. 1000 milliLiter(s) IV Continuous <Continuous>  dextrose 5%. 1000 milliLiter(s) IV Continuous <Continuous>  dextrose 50% Injectable 25 Gram(s) IV Push once  dextrose 50% Injectable 12.5 Gram(s) IV Push once  dextrose 50% Injectable 25 Gram(s) IV Push once  dextrose Oral Gel 15 Gram(s) Oral once PRN  enoxaparin Injectable 40 milliGRAM(s) SubCutaneous every 24 hours  fentaNYL   Patch  25 MICROgram(s)/Hr. 1 Patch Transdermal every 48 hours  glucagon  Injectable 1 milliGRAM(s) IntraMuscular once  guaiFENesin  milliGRAM(s) Oral every 12 hours  guaiFENesin Oral Liquid (Sugar-Free) 100 milliGRAM(s) Oral every 6 hours PRN  hydrocortisone sodium succinate Injectable 50 milliGRAM(s) IV Push every 8 hours  HYDROmorphone   Tablet 4 milliGRAM(s) Oral daily  insulin lispro (ADMELOG) corrective regimen sliding scale   SubCutaneous three times a day before meals  insulin lispro (ADMELOG) corrective regimen sliding scale   SubCutaneous at bedtime  levothyroxine 225 MICROGram(s) Oral daily  losartan 100 milliGRAM(s) Oral daily  magnesium hydroxide Suspension 30 milliLiter(s) Oral daily PRN  melatonin 3 milliGRAM(s) Oral at bedtime PRN  memantine 10 milliGRAM(s) Oral two times a day  metFORMIN 500 milliGRAM(s) Oral at bedtime  modafinil 400 milliGRAM(s) Oral daily  ondansetron Injectable 4 milliGRAM(s) IV Push every 6 hours PRN  pantoprazole    Tablet 40 milliGRAM(s) Oral before breakfast  polyethylene glycol 3350 17 Gram(s) Oral two times a day  pregabalin 100 milliGRAM(s) Oral three times a day  senna 2 Tablet(s) Oral at bedtime  sucralfate 1 Gram(s) Oral four times a day          REVIEW OF SYSTEMS:  CONSTITUTIONAL: No fever, no weight loss, or + fatigue  NECK: No pain, no stiffness  RESPIRATORY: + cough, no wheezing, no chills, no hemoptysis, No shortness of breath  CARDIOVASCULAR: No chest pain, no palpitations, no dizziness, no leg swelling  GASTROINTESTINAL: No abdominal pain. No nausea, no vomiting, no hematemesis; No diarrhea, no constipation. No melena, no hematochezia.  GENITOURINARY: No dysuria, no frequency, no hematuria, no incontinence  NEUROLOGICAL: No headaches, no loss of strength, no numbness, no tremors  SKIN: No itching, no burning  MUSCULOSKELETAL: No joint pain, no swelling; No muscle, no back, no extremity pain  PSYCHIATRIC: No depression, no mood swings,   HEME/LYMPH: No easy bruising, no bleeding gums  ALLERY AND IMMUNOLOGIC: No hives       Consultant(s) Notes Reviewed:  [X] YES  [ ] NO    PHYSICAL EXAM:  GENERAL: NAD  HEAD:  Atraumatic, Normocephalic  EYES: EOMI, PERRLA, conjunctiva and sclera clear  ENMT: No tonsillar erythema, exudates, or enlargement; Moist mucous membranes  NECK: Supple, No JVD  NERVOUS SYSTEM:  Awake & alert  CHEST/LUNG: scattered ronchi  HEART: Regular rate and rhythm  ABDOMEN: Soft, Nontender, Nondistended; Bowel sounds present  EXTREMITIES:  No clubbing, cyanosis, or edema  LYMPH: No lymphadenopathy noted  SKIN: No rashes      Advanced care planning discussed with patient/family [X] YES   [ ] NO    Advanced care planning discussed with patient/family. Patient's health status was discussed. All appropriate changes have been made regarding patient's end-of-life care. Advanced care planning forms reviewed/discussed/completed.  20 minutes spent.   
Patient is a 62y old  Female who presents with a chief complaint of flu-like symptoms (16 Mar 2023 05:25)      INTERVAL HPI/OVERNIGHT EVENTS: Patient seen and examined. NAD. + cough    Vital Signs Last 24 Hrs  T(C): 36.6 (19 Mar 2023 05:47), Max: 37.4 (18 Mar 2023 14:00)  T(F): 97.8 (19 Mar 2023 05:47), Max: 99.3 (18 Mar 2023 14:00)  HR: 63 (19 Mar 2023 07:40) (63 - 124)  BP: 135/82 (19 Mar 2023 05:47) (128/82 - 135/82)  BP(mean): --  RR: 17 (19 Mar 2023 05:47) (17 - 17)  SpO2: 99% (19 Mar 2023 07:40) (94% - 99%)    Parameters below as of 19 Mar 2023 08:57  Patient On (Oxygen Delivery Method): nasal cannula                  CAPILLARY BLOOD GLUCOSE      POCT Blood Glucose.: 92 mg/dL (19 Mar 2023 08:15)  POCT Blood Glucose.: 121 mg/dL (18 Mar 2023 21:10)  POCT Blood Glucose.: 139 mg/dL (18 Mar 2023 17:01)  POCT Blood Glucose.: 135 mg/dL (18 Mar 2023 11:33)              acetaminophen     Tablet .. 650 milliGRAM(s) Oral every 6 hours PRN  albuterol    0.083% 2.5 milliGRAM(s) Nebulizer every 6 hours  aluminum hydroxide/magnesium hydroxide/simethicone Suspension 30 milliLiter(s) Oral every 4 hours PRN  amLODIPine   Tablet 10 milliGRAM(s) Oral <User Schedule>  atorvastatin 40 milliGRAM(s) Oral at bedtime  celecoxib 200 milliGRAM(s) Oral daily  clonazePAM  Tablet 0.5 milliGRAM(s) Oral daily  dextrose 5%. 1000 milliLiter(s) IV Continuous <Continuous>  dextrose 5%. 1000 milliLiter(s) IV Continuous <Continuous>  dextrose 50% Injectable 25 Gram(s) IV Push once  dextrose 50% Injectable 12.5 Gram(s) IV Push once  dextrose 50% Injectable 25 Gram(s) IV Push once  dextrose Oral Gel 15 Gram(s) Oral once PRN  enoxaparin Injectable 40 milliGRAM(s) SubCutaneous every 24 hours  fentaNYL   Patch  25 MICROgram(s)/Hr. 1 Patch Transdermal every 48 hours  glucagon  Injectable 1 milliGRAM(s) IntraMuscular once  guaiFENesin  milliGRAM(s) Oral every 12 hours  guaiFENesin Oral Liquid (Sugar-Free) 100 milliGRAM(s) Oral every 6 hours PRN  hydrocortisone sodium succinate Injectable 50 milliGRAM(s) IV Push every 8 hours  HYDROmorphone   Tablet 4 milliGRAM(s) Oral daily  insulin lispro (ADMELOG) corrective regimen sliding scale   SubCutaneous three times a day before meals  insulin lispro (ADMELOG) corrective regimen sliding scale   SubCutaneous at bedtime  labetalol 400 milliGRAM(s) Oral <User Schedule>  levothyroxine 225 MICROGram(s) Oral daily  losartan 100 milliGRAM(s) Oral daily  magnesium hydroxide Suspension 30 milliLiter(s) Oral daily PRN  melatonin 3 milliGRAM(s) Oral at bedtime PRN  memantine 10 milliGRAM(s) Oral two times a day  metFORMIN 500 milliGRAM(s) Oral at bedtime  modafinil 400 milliGRAM(s) Oral daily  ondansetron Injectable 4 milliGRAM(s) IV Push every 6 hours PRN  pantoprazole    Tablet 40 milliGRAM(s) Oral before breakfast  polyethylene glycol 3350 17 Gram(s) Oral two times a day  pregabalin 100 milliGRAM(s) Oral three times a day  senna 2 Tablet(s) Oral at bedtime  sucralfate 1 Gram(s) Oral four times a day          REVIEW OF SYSTEMS:  CONSTITUTIONAL: No fever, no weight loss, or + fatigue  NECK: No pain, no stiffness  RESPIRATORY: + cough, no wheezing, no chills, no hemoptysis, No shortness of breath  CARDIOVASCULAR: No chest pain, no palpitations, no dizziness, no leg swelling  GASTROINTESTINAL: No abdominal pain. No nausea, no vomiting, no hematemesis; No diarrhea, no constipation. No melena, no hematochezia.  GENITOURINARY: No dysuria, no frequency, no hematuria, no incontinence  NEUROLOGICAL: No headaches, no loss of strength, no numbness, no tremors  SKIN: No itching, no burning  MUSCULOSKELETAL: No joint pain, no swelling; No muscle, no back, no extremity pain  PSYCHIATRIC: No depression, no mood swings,   HEME/LYMPH: No easy bruising, no bleeding gums  ALLERY AND IMMUNOLOGIC: No hives       Consultant(s) Notes Reviewed:  [X] YES  [ ] NO    PHYSICAL EXAM:  GENERAL: NAD  HEAD:  Atraumatic, Normocephalic  EYES: EOMI, PERRLA, conjunctiva and sclera clear  ENMT: No tonsillar erythema, exudates, or enlargement; Moist mucous membranes  NECK: Supple, No JVD  NERVOUS SYSTEM:  Awake & alert  CHEST/LUNG: scattered ronchi  HEART: Regular rate and rhythm  ABDOMEN: Soft, Nontender, Nondistended; Bowel sounds present  EXTREMITIES:  No clubbing, cyanosis, or edema  LYMPH: No lymphadenopathy noted  SKIN: No rashes      Advanced care planning discussed with patient/family [X] YES   [ ] NO    Advanced care planning discussed with patient/family. Patient's health status was discussed. All appropriate changes have been made regarding patient's end-of-life care. Advanced care planning forms reviewed/discussed/completed.  20 minutes spent.

## 2023-03-27 ENCOUNTER — APPOINTMENT (OUTPATIENT)
Dept: CARDIOLOGY | Facility: CLINIC | Age: 63
End: 2023-03-27

## 2023-05-25 ENCOUNTER — EMERGENCY (EMERGENCY)
Facility: HOSPITAL | Age: 63
LOS: 1 days | Discharge: HOME | End: 2023-05-25
Attending: STUDENT IN AN ORGANIZED HEALTH CARE EDUCATION/TRAINING PROGRAM | Admitting: STUDENT IN AN ORGANIZED HEALTH CARE EDUCATION/TRAINING PROGRAM
Payer: MEDICARE

## 2023-05-25 VITALS
HEART RATE: 136 BPM | OXYGEN SATURATION: 98 % | DIASTOLIC BLOOD PRESSURE: 100 MMHG | TEMPERATURE: 100 F | WEIGHT: 153 LBS | RESPIRATION RATE: 20 BRPM | SYSTOLIC BLOOD PRESSURE: 179 MMHG

## 2023-05-25 VITALS
HEART RATE: 98 BPM | RESPIRATION RATE: 18 BRPM | OXYGEN SATURATION: 99 % | SYSTOLIC BLOOD PRESSURE: 156 MMHG | DIASTOLIC BLOOD PRESSURE: 90 MMHG | TEMPERATURE: 99 F

## 2023-05-25 DIAGNOSIS — Z98.89 OTHER SPECIFIED POSTPROCEDURAL STATES: Chronic | ICD-10-CM

## 2023-05-25 DIAGNOSIS — Z90.49 ACQUIRED ABSENCE OF OTHER SPECIFIED PARTS OF DIGESTIVE TRACT: Chronic | ICD-10-CM

## 2023-05-25 LAB
ALBUMIN SERPL ELPH-MCNC: 3.8 G/DL — SIGNIFICANT CHANGE UP (ref 3.3–5)
ALP SERPL-CCNC: 206 U/L — HIGH (ref 40–120)
ALT FLD-CCNC: 35 U/L — SIGNIFICANT CHANGE UP (ref 12–78)
ANION GAP SERPL CALC-SCNC: 7 MMOL/L — SIGNIFICANT CHANGE UP (ref 5–17)
AST SERPL-CCNC: 22 U/L — SIGNIFICANT CHANGE UP (ref 15–37)
BASOPHILS # BLD AUTO: 0.05 K/UL — SIGNIFICANT CHANGE UP (ref 0–0.2)
BASOPHILS NFR BLD AUTO: 0.5 % — SIGNIFICANT CHANGE UP (ref 0–2)
BILIRUB SERPL-MCNC: 0.5 MG/DL — SIGNIFICANT CHANGE UP (ref 0.2–1.2)
BUN SERPL-MCNC: 20 MG/DL — SIGNIFICANT CHANGE UP (ref 7–23)
CALCIUM SERPL-MCNC: 9.9 MG/DL — SIGNIFICANT CHANGE UP (ref 8.5–10.1)
CHLORIDE SERPL-SCNC: 110 MMOL/L — HIGH (ref 96–108)
CO2 SERPL-SCNC: 25 MMOL/L — SIGNIFICANT CHANGE UP (ref 22–31)
CREAT SERPL-MCNC: 0.75 MG/DL — SIGNIFICANT CHANGE UP (ref 0.5–1.3)
D DIMER BLD IA.RAPID-MCNC: <150 NG/ML DDU — SIGNIFICANT CHANGE UP
EGFR: 91 ML/MIN/1.73M2 — SIGNIFICANT CHANGE UP
EOSINOPHIL # BLD AUTO: 0.21 K/UL — SIGNIFICANT CHANGE UP (ref 0–0.5)
EOSINOPHIL NFR BLD AUTO: 2.2 % — SIGNIFICANT CHANGE UP (ref 0–6)
GLUCOSE SERPL-MCNC: 130 MG/DL — HIGH (ref 70–99)
HCT VFR BLD CALC: 36.8 % — SIGNIFICANT CHANGE UP (ref 34.5–45)
HGB BLD-MCNC: 12.2 G/DL — SIGNIFICANT CHANGE UP (ref 11.5–15.5)
IMM GRANULOCYTES NFR BLD AUTO: 0.1 % — SIGNIFICANT CHANGE UP (ref 0–0.9)
LYMPHOCYTES # BLD AUTO: 2.18 K/UL — SIGNIFICANT CHANGE UP (ref 1–3.3)
LYMPHOCYTES # BLD AUTO: 23.2 % — SIGNIFICANT CHANGE UP (ref 13–44)
MCHC RBC-ENTMCNC: 29.3 PG — SIGNIFICANT CHANGE UP (ref 27–34)
MCHC RBC-ENTMCNC: 33.2 GM/DL — SIGNIFICANT CHANGE UP (ref 32–36)
MCV RBC AUTO: 88.5 FL — SIGNIFICANT CHANGE UP (ref 80–100)
MONOCYTES # BLD AUTO: 0.83 K/UL — SIGNIFICANT CHANGE UP (ref 0–0.9)
MONOCYTES NFR BLD AUTO: 8.8 % — SIGNIFICANT CHANGE UP (ref 2–14)
NEUTROPHILS # BLD AUTO: 6.1 K/UL — SIGNIFICANT CHANGE UP (ref 1.8–7.4)
NEUTROPHILS NFR BLD AUTO: 65.2 % — SIGNIFICANT CHANGE UP (ref 43–77)
NRBC # BLD: 0 /100 WBCS — SIGNIFICANT CHANGE UP (ref 0–0)
NT-PROBNP SERPL-SCNC: 635 PG/ML — HIGH (ref 0–125)
PLATELET # BLD AUTO: 275 K/UL — SIGNIFICANT CHANGE UP (ref 150–400)
POTASSIUM SERPL-MCNC: 3.3 MMOL/L — LOW (ref 3.5–5.3)
POTASSIUM SERPL-SCNC: 3.3 MMOL/L — LOW (ref 3.5–5.3)
PROT SERPL-MCNC: 7.3 G/DL — SIGNIFICANT CHANGE UP (ref 6–8.3)
RBC # BLD: 4.16 M/UL — SIGNIFICANT CHANGE UP (ref 3.8–5.2)
RBC # FLD: 12.2 % — SIGNIFICANT CHANGE UP (ref 10.3–14.5)
SODIUM SERPL-SCNC: 142 MMOL/L — SIGNIFICANT CHANGE UP (ref 135–145)
TROPONIN I, HIGH SENSITIVITY RESULT: 27.5 NG/L — SIGNIFICANT CHANGE UP
WBC # BLD: 9.38 K/UL — SIGNIFICANT CHANGE UP (ref 3.8–10.5)
WBC # FLD AUTO: 9.38 K/UL — SIGNIFICANT CHANGE UP (ref 3.8–10.5)

## 2023-05-25 PROCEDURE — 71275 CT ANGIOGRAPHY CHEST: CPT | Mod: 26,MA

## 2023-05-25 PROCEDURE — 85379 FIBRIN DEGRADATION QUANT: CPT

## 2023-05-25 PROCEDURE — 99285 EMERGENCY DEPT VISIT HI MDM: CPT

## 2023-05-25 PROCEDURE — 85025 COMPLETE CBC W/AUTO DIFF WBC: CPT

## 2023-05-25 PROCEDURE — 83880 ASSAY OF NATRIURETIC PEPTIDE: CPT

## 2023-05-25 PROCEDURE — 96374 THER/PROPH/DIAG INJ IV PUSH: CPT | Mod: XU

## 2023-05-25 PROCEDURE — 94640 AIRWAY INHALATION TREATMENT: CPT

## 2023-05-25 PROCEDURE — 93005 ELECTROCARDIOGRAM TRACING: CPT

## 2023-05-25 PROCEDURE — 84484 ASSAY OF TROPONIN QUANT: CPT

## 2023-05-25 PROCEDURE — 36415 COLL VENOUS BLD VENIPUNCTURE: CPT

## 2023-05-25 PROCEDURE — 93010 ELECTROCARDIOGRAM REPORT: CPT

## 2023-05-25 PROCEDURE — 80053 COMPREHEN METABOLIC PANEL: CPT

## 2023-05-25 PROCEDURE — 71045 X-RAY EXAM CHEST 1 VIEW: CPT | Mod: 26

## 2023-05-25 PROCEDURE — 71045 X-RAY EXAM CHEST 1 VIEW: CPT

## 2023-05-25 PROCEDURE — 99285 EMERGENCY DEPT VISIT HI MDM: CPT | Mod: 25

## 2023-05-25 PROCEDURE — 71275 CT ANGIOGRAPHY CHEST: CPT | Mod: MA

## 2023-05-25 RX ORDER — POTASSIUM CHLORIDE 20 MEQ
40 PACKET (EA) ORAL ONCE
Refills: 0 | Status: COMPLETED | OUTPATIENT
Start: 2023-05-25 | End: 2023-05-25

## 2023-05-25 RX ORDER — ACETAMINOPHEN 500 MG
650 TABLET ORAL ONCE
Refills: 0 | Status: COMPLETED | OUTPATIENT
Start: 2023-05-25 | End: 2023-05-25

## 2023-05-25 RX ORDER — ALBUTEROL 90 UG/1
2.5 AEROSOL, METERED ORAL ONCE
Refills: 0 | Status: COMPLETED | OUTPATIENT
Start: 2023-05-25 | End: 2023-05-25

## 2023-05-25 RX ADMIN — Medication 40 MILLIEQUIVALENT(S): at 18:44

## 2023-05-25 RX ADMIN — Medication 650 MILLIGRAM(S): at 20:12

## 2023-05-25 RX ADMIN — ALBUTEROL 2.5 MILLIGRAM(S): 90 AEROSOL, METERED ORAL at 15:22

## 2023-05-25 RX ADMIN — Medication 125 MILLIGRAM(S): at 15:21

## 2023-05-25 NOTE — ED PROVIDER NOTE - OBJECTIVE STATEMENT
Patient is a 63-year-old white female with history of hypertension, diabetes, hyperlipidemia, adrenal insufficiency, GERD, gastroparesis, reflex sympathetic dystrophy, Hashimoto's thyroiditis, status post acute respiratory failure earlier this year with YULIA now with slight increase shortness of breath over the past 2 days more so on exertion less rest.  Patient went to see pulmonary physician Dr. Lafleur today and was found to have a resting sinus tachycardia at 120 ultimately was sent here to see cardiologist.  Patient denies any fever chills nausea vomiting diarrhea.  No cough.  No chest pain.  No hemoptysis.

## 2023-05-25 NOTE — ED ADULT NURSE NOTE - NS_ED_NURSE_TEACHING_TOPIC_ED_A_ED
Respiratory Same Histology In Subsequent Stages Text: The pattern and morphology of the tumor is as described in the first stage.

## 2023-05-25 NOTE — ED PROVIDER NOTE - CONSTITUTIONAL, MLM
normal... Somewhat weak appearing white female , awake, alert, oriented to person, place, time/situation and in no apparent distress.

## 2023-05-25 NOTE — ED PROVIDER NOTE - CLINICAL SUMMARY MEDICAL DECISION MAKING FREE TEXT BOX
Middle-aged white female with extensive past medical history including hypertension diabetes hyperlipidemia and respiratory failure here now with worsening shortness of breath requiring thorough evaluation labs EKG imaging and medications.

## 2023-05-25 NOTE — CONSULT NOTE ADULT - NS ATTEND AMEND GEN_ALL_CORE FT
does not have significant underlying structural heart disease  has had resp issues and resp failure in the last few months, without a meaningful cv component  now with sig sinus tach and resp sxs  suspect that the heart rate reflects her pulm issues, and is not from a primarily cv problem  suggest additional imaging (ctpa) to better assess her resp insuff  reports that she is not anxious though this might contribute somewhat, noting that her heart rate before I walked in was 15 bpm slower than after we began to converse. high hr and elevated bp together would be consistent with anx  cont her regular meds  pulm eval

## 2023-05-25 NOTE — ED PROVIDER NOTE - NSFOLLOWUPINSTRUCTIONS_ED_ALL_ED_FT
Please follow up with your Primary Care Physician and any specialists as discussed.  Please take your medications as prescribed and or instructed.  If your symptoms persist or worsen, please seek care. Either return to the Emergency Department, go to urgent care or see your primary care doctor.  Please refer to general information and instructions attached or below:     Shortness of Breath    WHAT YOU NEED TO KNOW:    Shortness of breath is a feeling that you cannot get enough air when you breathe in. You may have this feeling only during activity, or all the time. Your symptoms can range from mild to severe. Shortness of breath may be a sign of a serious health condition that needs immediate care.    DISCHARGE INSTRUCTIONS:    Return to the emergency department if:     Your signs and symptoms are the same or worse within 24 hours of treatment.       The skin over your ribs or on your neck sinks in when you breathe.       You feel confused or dizzy.    Contact your healthcare provider if:     You have new or worsening symptoms.      You have questions or concerns about your condition or care.    Medicines:     Medicines may be used to treat the cause of your symptoms. You may need medicine to treat a bacterial infection or reduce anxiety. Other medicines may be used to open your airway, reduce swelling, or remove extra fluid. If you have a heart condition, you may need medicine to help your heart beat more strongly or regularly.      Take your medicine as directed. Contact your healthcare provider if you think your medicine is not helping or if you have side effects. Tell him or her if you are allergic to any medicine. Keep a list of the medicines, vitamins, and herbs you take. Include the amounts, and when and why you take them. Bring the list or the pill bottles to follow-up visits. Carry your medicine list with you in case of an emergency.    Manage shortness of breath:     Create an action plan. You and your healthcare provider can work together to create a plan for how to handle shortness of breath. The plan can include daily activities, treatment changes, and what to do if you have severe breathing problems.      Lean forward on your elbows when you sit. This helps your lungs expand and may make it easier to breathe.      Use pursed-lip breathing any time you feel short of breath. Breathe in through your nose and then slowly breathe out through your mouth with your lips slightly puckered. It should take you twice as long to breathe out as it did to breathe in.Breathe in Breathe out           Do not smoke. Nicotine and other chemicals in cigarettes and cigars can cause lung damage and make shortness of breath worse. Ask your healthcare provider for information if you currently smoke and need help to quit. E-cigarettes or smokeless tobacco still contain nicotine. Talk to your healthcare provider before you use these products.      Reach or maintain a healthy weight. Your healthcare provider can help you create a safe weight loss plan if you are overweight.      Exercise as directed. Exercise can help your lungs work more easily. Exercise can also help you lose weight if needed. Try to get at least 30 minutes of exercise most days of the week.    Follow up with your healthcare provider or specialist as directed: Write down your questions so you remember to ask them during your visits.

## 2023-05-25 NOTE — CONSULT NOTE ADULT - SUBJECTIVE AND OBJECTIVE BOX
[facility] - Tonya Rush, Naveed Vera, Braulio, Minh, Paola Horne  Office Number: [phone]    Initial Consult Note    CHIEF COMPLAINT: Patient is a 63y old  Female who presents with a chief complaint of shortness of breath    HPI:  Patient is a 63 year old female with PMH of chronic sinus tachycardia secondary to autonomic dysfunction, Cushing syndrome,Raynaud's syndrome, fibromyalgia,  hypothyroidism, Hypertension, osteoarthritis, diabetes, gastroparesis, GERD, cervical radiculopathy, mitral valve prolapse, neurogenic bladder,  presents to [place] ER with worsening shortness of breath. In the ER, patient was found to have elevated systolic blood pressure of 179/100. HR is sinus tachycardia 124. Breathing is labored. Patient EKG revealing a NSR with some non-specific ST depressions. Patient endorses chest pain 3 out of 10 on exam. She was recently admitted to [place] in March with acute hypoxic respiratory failure, acute bronchitis in setting of viral PNA. She was  treated and released to Rehab. She has been home for 3 weeks and started to not feel well 3 days ago with worsening SOB.    Cardiology consulted for further evaluation of the SOB and sinus tachycardia and hypertension.    PAST MEDICAL & SURGICAL HISTORY:  Raynaud's disease  Fibromyalgia  Thyroiditis  hashimottos  Osteoarthritis  Migraine  Cushing's syndrome  Reflex sympathetic dystrophy  MVP (mitral valve prolapse)  GERD (gastroesophageal reflux disease)  Cervical radiculopathy  TMJ (dislocation of temporomandibular joint)  Neurogenic bladder  Hypothyroidism  Kidney stone  left  Gastroparesis  Acute kidney injury  Diabetes  S/P cholecystectomy  S/P knee surgery        SOCIAL HISTORY:  No tobacco, ethanol, or drug abuse.  FAMILY HISTORY:  Family history of CHF (congestive heart failure)    Family history of hypothyroidism (Sibling)      No family history of acute MI or sudden cardiac death.  MEDICATIONS  (STANDING):  Home Medications:  amLODIPine 10 mg oral tablet: 1 tab(s) orally once a day in the afternoon (15 Mar 2023 10:51)  Carafate 1 g oral tablet: 1 tab(s) orally 4 times a day (before meals and at bedtime) (15 Mar 2023 10:51)  CeleBREX 200 mg oral capsule: 1 cap(s) orally once a day (15 Mar 2023 10:51)  Dexilant 60 mg oral delayed release capsule: 1 cap(s) orally once a day (15 Mar 2023 10:51)  Dilaudid 4 mg oral tablet: 1 tab(s) orally once a day (15 Mar 2023 10:51)  Duragesic-25 transdermal film, extended release: 1 film(s) transdermal every 48 hours (15 Mar 2023 10:51)  hydrocortisone 10 mg oral tablet: 1 tab(s) orally once a day (15 Mar 2023 10:51)  hydrocortisone 5 mg oral tablet: 1 tab(s) orally once a day -- afternoon (15 Mar 2023 10:51)  KlonoPIN 0.5 mg oral tablet: 1 tab(s) orally once a day (15 Mar 2023 10:51)  labetalol 200 mg oral tablet: 2 tab(s) orally every 12 hours (15 Mar 2023 10:51)  levothyroxine 75 mcg (0.075 mg) oral tablet: 3 tab(s) orally once a day (21 Mar 2023 12:41)  Linzess 145 mcg oral capsule: 1 cap(s) orally once a day (15 Mar 2023 10:51)  Lyrica 100 mg oral capsule: 1 cap(s) orally 3 times a day (15 Mar 2023 10:51)  metFORMIN 500 mg oral tablet: 1 tab(s) orally once a day (at bedtime) (15 Mar 2023 10:51)  Motegrity 1 mg oral tablet: 1 tab(s) orally once a day (15 Mar 2023 10:51)  Namenda XR:  (15 Mar 2023 10:51)  olmesartan 40 mg oral tablet: 1 tab(s) orally once a day (15 Mar 2023 10:51)  pregabalin 100 mg oral capsule: 1 cap(s) orally 3 times a day (23 Mar 2023 20:50)  Prevacid 30 mg oral delayed release capsule: 1 cap(s) orally once a day (15 Mar 2023 10:51)  Prolia 60 mg/mL subcutaneous solution:  (15 Mar 2023 10:51)  Provigil 200 mg oral tablet: 2 tab(s) orally once a day (in the morning) (15 Mar 2023 10:51)  rosuvastatin 10 mg oral tablet: 1 tab(s) orally once a day (15 Mar 2023 10:51)  Sancuso 3.1 mg/24 hr transdermal film, extended release: 1 patch transdermal once a week (15 Mar 2023 10:51)  Zofran 4 mg oral tablet: 1 tab(s) orally every 8 hours, As Needed (15 Mar 2023 10:51)  Zomig 2.5 mg nasal spray:  (15 Mar 2023 10:51)    MEDICATIONS  (PRN):    Allergies    citrus (Unknown)  Pyridium (Hives)  Donnatal (Rash)  fish (Nausea)  shellfish (Unknown)  Ceclor (Hives)  Robaxin (Hives)  sulfa drugs (Hives)  vancomycin (Rash)  vicryl sutures, (Unknown)  ProBarimin QT (Unknown)  codeine (Hives)  neoprene brace material (Unknown)  Vioxx (Rash)  Betadine (Unknown)  tetracycline (Hives)    Intolerances    Benadryl (Unknown)  atropine (Unknown)    REVIEW OF SYSTEMS:  CONSTITUTIONAL: endorses weakness, endorses fever  EYES/ENT: No visual changes;  No vertigo or throat pain   NECK: No pain or stiffness  RESPIRATORY: endorses shortness of breath and labored breathing  CARDIOVASCULAR: endorses chest pressure  GASTROINTESTINAL: No abdominal pain. No nausea, vomiting, or hematemesis; No diarrhea or constipation. No melena or hematochezia.  GENITOURINARY: No dysuria, frequency or hematuria  NEUROLOGICAL: No numbness or weakness  SKIN: No itching or rash  All other review of systems is negative unless indicated above    VITAL SIGNS:   Vital Signs Last 24 Hrs  T(C): 38 (25 May 2023 14:30), Max: 38 (25 May 2023 14:30)  T(F): 100.4 (25 May 2023 14:30), Max: 100.4 (25 May 2023 14:30)  HR: 136 (25 May 2023 14:30) (136 - 136)  BP: 179/100 (25 May 2023 14:30) (179/100 - 179/100)  BP(mean): --  RR: 20 (25 May 2023 14:30) (20 - 20)  SpO2: 98% (25 May 2023 14:30) (98% - 98%)    Parameters below as of 25 May 2023 14:30  Patient On (Oxygen Delivery Method): nasal cannula  O2 Flow (L/min): 2    I&O's Summary    On Exam:  Constitutional: NAD, alert and oriented x 3  Lungs:  Non-labored, breath sounds are clear bilaterally, No wheezing, rales or rhonchi  Cardiovascular: RRR.  S1 and S2 positive.  No murmurs, rubs, gallops or clicks  Gastrointestinal: Bowel Sounds present, soft, nontender.   Lymph: No peripheral edema. No cervical lymphadenopathy.  Neurological: Alert, no focal deficits  Skin: No rashes or ulcers   Psych:  Mood & affect appropriate.    LABS: All Labs Reviewed:

## 2023-05-25 NOTE — CONSULT NOTE ADULT - ASSESSMENT
Patient is a 63 year old female with PMH of chronic sinus tachycardia secondary to autonomic dysfunction, Cushing syndrome,Raynaud's syndrome, fibromyalgia,  hypothyroidism, Hypertension, osteoarthritis, diabetes, gastroparesis, GERD, cervical radiculopathy, mitral valve prolapse, neurogenic bladder,  presents to [place] with SOB in setting of low grade fever, elevated BP, and sinus tachycardia.    PLAN:  EKG reviewed, sinus tachycardia  ECHO reviewed from 2022, EF of 60%, normal LV, normal RV, mitral valve prolapse    Sinus Tachycardia  -may be in response to underlying infection, HR more elevated to 124-130s, than previous (100-110)  -would obtain CTangio of chest to rule out PE   -recently treated for bronchitis and PNA, was also in rehab   -not suggestive of ACS  -hx of autonomic dysfunction   -agree with albuterol tx  -would obtain pulm consultation  -CXR reviewed, clear    HTN:  BP is elevated to 179/100  -continue labetolol 200 bid for now  -continue amlodipine 10 qd  -continue olmesartan 40 qd    Does not appear volume overloaded on exam.    Had stress test in 2020, poor study       This plan has been reviewed with Dr. Lucio  Cardiology will continue to follow     Patient is a 63 year old female with PMH of chronic sinus tachycardia secondary to autonomic dysfunction, Cushing syndrome,Raynaud's syndrome, fibromyalgia,  hypothyroidism, Hypertension, osteoarthritis, diabetes, gastroparesis, GERD, cervical radiculopathy, mitral valve prolapse, neurogenic bladder,  presents to [place] with SOB in setting of low grade fever, elevated BP, and sinus tachycardia.    PLAN:  EKG reviewed, sinus tachycardia  ECHO reviewed from 2022, EF of 60%, normal LV, normal RV, mitral valve prolapse    Sinus Tachycardia  -may be in response to underlying infection, HR more elevated to 124-130s, than previous (100-110)  -would obtain CTangio of chest to rule out PE   -recently treated for bronchitis and PNA, was also in rehab   -not suggestive of ACS  -can obtain troponin level given chest pain  -hx of autonomic dysfunction   -agree with albuterol tx  -would obtain pulm consultation  -CXR reviewed, clear    HTN:  BP is elevated to 179/100  -continue labetolol 200 bid for now  -continue amlodipine 10 qd  -continue olmesartan 40 qd    Does not appear volume overloaded on exam.    Had stress test in 2020, poor study       This plan has been reviewed with Dr. Lucio  Cardiology will continue to follow

## 2023-05-25 NOTE — ED PROVIDER NOTE - CARE PROVIDER_API CALL
Anh Lafleur)  Critical Care Medicine; Internal Medicine; Pulmonary Disease  [address], [address]  [place] [address]  Phone: [phone]  Fax: [phone]  Established Patient  Follow Up Time: 1-3 Days

## 2023-05-25 NOTE — ED PROVIDER NOTE - PATIENT PORTAL LINK FT
You can access the FollowMyHealth Patient Portal offered by [facility] by registering at the following website: [email or web address]. By joining [facility]’s FollowMyHealth portal, you will also be able to view your health information using other applications (apps) compatible with our system.

## 2023-05-25 NOTE — ED ADULT NURSE NOTE - OBJECTIVE STATEMENT
64y/o female A&ox4, ambulatory received in rm11a. pt c/o sob, palpitations, chest discomfort. pt recently diagnosed with acute respiratory failure, on 2L o2 nc at baseline. denies dizziness, lightheadedness, CP, back pain, n/v. abd soft nontender nondistended. pt sinus tachy on cardiac monitor to 120s. respirations even, mildly labored at this time. 20g iv placed in LAC, labs sent. covid sent. md at bedside for eval. bed in lowest position, side rails up, call bell in hand, safety maintained. awaiting further orders. will continue to monitor.

## 2023-05-25 NOTE — ED ADULT NURSE NOTE - NSFALLUNIVINTERV_ED_ALL_ED
Bed/Stretcher in lowest position, wheels locked, appropriate side rails in place/Call bell, personal items and telephone in reach/Instruct patient to call for assistance before getting out of bed/chair/stretcher/Non-slip footwear applied when patient is off stretcher/[place] to call system/Physically safe environment - no spills, clutter or unnecessary equipment/Purposeful proactive rounding/Room/bathroom lighting operational, light cord in reach

## 2023-05-25 NOTE — ED PROVIDER NOTE - PROGRESS NOTE DETAILS
Patient was evaluated thoroughly by cardiologist Dr. Lucio who felt that if CT imaging was negative patient could be discharged to follow-up as outpatient. Signout pending CT results and discharge if no acute findings.  CT negative.  Results conveyed to patient and family.  Will discharge.

## 2023-05-25 NOTE — ED PROVIDER NOTE - CONSIDERATION OF ADMISSION OBSERVATION
Consider escalation of care to include admission and observation if evaluation and/or labs and imaging in the emergency department necessitate acute care hospitalization Consideration of Admission/Observation

## 2023-05-25 NOTE — ED ADULT NURSE NOTE - NURSING NEURO ORIENTATION
REQUET SENT  TO DR PHILIPPE'S OFFICE FOR COPY OF MOST RECENT MAMMOGRAM.    oriented to person, place and time

## 2023-06-22 ENCOUNTER — NON-APPOINTMENT (OUTPATIENT)
Age: 63
End: 2023-06-22

## 2023-07-10 ENCOUNTER — NON-APPOINTMENT (OUTPATIENT)
Age: 63
End: 2023-07-10

## 2023-07-10 ENCOUNTER — APPOINTMENT (OUTPATIENT)
Dept: CARDIOLOGY | Facility: CLINIC | Age: 63
End: 2023-07-10
Payer: MEDICARE

## 2023-07-10 VITALS
WEIGHT: 153 LBS | HEIGHT: 61 IN | BODY MASS INDEX: 28.89 KG/M2 | HEART RATE: 130 BPM | SYSTOLIC BLOOD PRESSURE: 182 MMHG | DIASTOLIC BLOOD PRESSURE: 88 MMHG | OXYGEN SATURATION: 99 %

## 2023-07-10 DIAGNOSIS — R03.0 ELEVATED BLOOD-PRESSURE READING, W/OUT DIAGNOSIS OF HYPERTENSION: ICD-10-CM

## 2023-07-10 DIAGNOSIS — R00.0 TACHYCARDIA, UNSPECIFIED: ICD-10-CM

## 2023-07-10 PROCEDURE — 93000 ELECTROCARDIOGRAM COMPLETE: CPT

## 2023-07-10 PROCEDURE — 99215 OFFICE O/P EST HI 40 MIN: CPT

## 2023-07-10 RX ORDER — LEVOTHYROXINE SODIUM 100 UG/1
100 TABLET ORAL DAILY
Refills: 0 | Status: ACTIVE | COMMUNITY
Start: 2022-10-12

## 2023-07-10 RX ORDER — SUCRALFATE 1 G/1
1 TABLET ORAL 4 TIMES DAILY
Refills: 0 | Status: ACTIVE | COMMUNITY
Start: 2023-05-01

## 2023-07-10 RX ORDER — METFORMIN ER 500 MG 500 MG/1
500 TABLET ORAL DAILY
Refills: 0 | Status: ACTIVE | COMMUNITY
Start: 2022-10-17

## 2023-07-10 RX ORDER — LABETALOL HYDROCHLORIDE 300 MG/1
300 TABLET, FILM COATED ORAL
Qty: 180 | Refills: 3 | Status: DISCONTINUED | COMMUNITY
Start: 2023-01-20 | End: 2023-07-10

## 2023-07-10 RX ORDER — METFORMIN HYDROCHLORIDE 500 MG/1
500 TABLET, COATED ORAL DAILY
Qty: 30 | Refills: 0 | Status: DISCONTINUED | COMMUNITY
End: 2023-07-10

## 2023-07-10 RX ORDER — AMLODIPINE BESYLATE 10 MG/1
10 TABLET ORAL
Qty: 90 | Refills: 1 | Status: DISCONTINUED | COMMUNITY
Start: 2022-12-13 | End: 2023-07-10

## 2023-07-25 ENCOUNTER — APPOINTMENT (OUTPATIENT)
Dept: CARDIOLOGY | Facility: CLINIC | Age: 63
End: 2023-07-25
Payer: MEDICARE

## 2023-07-25 PROCEDURE — 93306 TTE W/DOPPLER COMPLETE: CPT

## 2023-07-26 LAB
25(OH)D3 SERPL-MCNC: 30.8 NG/ML
ALBUMIN SERPL ELPH-MCNC: 4.6 G/DL
ALP BLD-CCNC: 211 U/L
ALT SERPL-CCNC: 49 U/L
ANION GAP SERPL CALC-SCNC: 13 MMOL/L
AST SERPL-CCNC: 35 U/L
BILIRUB SERPL-MCNC: 0.2 MG/DL
BUN SERPL-MCNC: 20 MG/DL
CALCIUM SERPL-MCNC: 10.2 MG/DL
CHLORIDE SERPL-SCNC: 103 MMOL/L
CHOLEST SERPL-MCNC: 144 MG/DL
CO2 SERPL-SCNC: 24 MMOL/L
CREAT SERPL-MCNC: 0.68 MG/DL
EGFR: 99 ML/MIN/1.73M2
ESTIMATED AVERAGE GLUCOSE: 126 MG/DL
GLUCOSE SERPL-MCNC: 131 MG/DL
HBA1C MFR BLD HPLC: 6 %
HCT VFR BLD CALC: 40.9 %
HDLC SERPL-MCNC: 55 MG/DL
HGB BLD-MCNC: 12.4 G/DL
LDLC SERPL CALC-MCNC: 68 MG/DL
LDLC SERPL DIRECT ASSAY-MCNC: 70 MG/DL
MCHC RBC-ENTMCNC: 28.1 PG
MCHC RBC-ENTMCNC: 30.3 GM/DL
MCV RBC AUTO: 92.7 FL
NONHDLC SERPL-MCNC: 90 MG/DL
PLATELET # BLD AUTO: 285 K/UL
POTASSIUM SERPL-SCNC: 4.4 MMOL/L
PROT SERPL-MCNC: 7 G/DL
RBC # BLD: 4.41 M/UL
RBC # FLD: 13 %
SODIUM SERPL-SCNC: 140 MMOL/L
TRIGL SERPL-MCNC: 123 MG/DL
TSH SERPL-ACNC: <0.01 UIU/ML
WBC # FLD AUTO: 6.66 K/UL

## 2023-07-29 ENCOUNTER — NON-APPOINTMENT (OUTPATIENT)
Age: 63
End: 2023-07-29

## 2023-08-11 ENCOUNTER — TRANSCRIPTION ENCOUNTER (OUTPATIENT)
Age: 63
End: 2023-08-11

## 2023-08-18 ENCOUNTER — APPOINTMENT (OUTPATIENT)
Dept: CT IMAGING | Facility: CLINIC | Age: 63
End: 2023-08-18
Payer: MEDICARE

## 2023-08-18 PROCEDURE — 75574 CT ANGIO HRT W/3D IMAGE: CPT

## 2023-08-23 ENCOUNTER — APPOINTMENT (OUTPATIENT)
Dept: CARDIOLOGY | Facility: CLINIC | Age: 63
End: 2023-08-23
Payer: MEDICARE

## 2023-08-23 VITALS
HEIGHT: 61 IN | WEIGHT: 169 LBS | HEART RATE: 74 BPM | OXYGEN SATURATION: 99 % | SYSTOLIC BLOOD PRESSURE: 130 MMHG | DIASTOLIC BLOOD PRESSURE: 76 MMHG | BODY MASS INDEX: 31.91 KG/M2

## 2023-08-23 DIAGNOSIS — E78.5 HYPERLIPIDEMIA, UNSPECIFIED: ICD-10-CM

## 2023-08-23 PROCEDURE — 99215 OFFICE O/P EST HI 40 MIN: CPT

## 2023-08-23 PROCEDURE — 93000 ELECTROCARDIOGRAM COMPLETE: CPT

## 2023-08-23 NOTE — CARDIOLOGY SUMMARY
[de-identified] : 8/23/23 -baseline artifact hampers the interpretation, however, the rhythm appears to be sinus at a rate of 61 bpm.  Nonspecific poor R wave progression with diminished voltage in leads V4-V6.

## 2023-08-23 NOTE — DISCUSSION/SUMMARY
[EKG obtained to assist in diagnosis and management of assessed problem(s)] : EKG obtained to assist in diagnosis and management of assessed problem(s) [FreeTextEntry1] : Flora was hospitalized in March 2023 with acute hypoxic respiratory failure, and has since been utilizing home oxygen therapy 20 hours/day.  She has noted a similar degree of dyspnea on exertion.  Coronary CT angiography performed on 8/18/2023 did not reveal evidence for obstructive coronary artery disease.  Echocardiography performed on 7/25/2023 revealed normal left ventricular systolic function, mild mitral regurgitation, mild tricuspid regurgitation, and no evidence of pulmonary hypertension.  Her cardiac examination today is remarkable for a soft systolic ejection murmur, unchanged from her previous visit with me.  Lung examination reveals generalized diminished breath sounds.  She is not exhibiting lower extremity swelling today.  Her oxygen saturation is 99% on oxygen therapy.  Her blood pressure reading today is .  Her electrocardiogram today reveals sinus rhythm with nonspecific poor R wave progression associated with diminished voltage in leads V4-V6, essentially unchanged from her previous office tracing, with the exception of a slower heart rate on the present tracing.  As her blood pressure reading today is normal, I have instructed the patient to continue her antihypertensive therapy as presently prescribed for the time being, including labetalol 400 mg twice daily and Benicar 40 mg daily.  I suspect that the patient's symptom of dyspnea is related to her pulmonary condition, especially noting that coronary CT angiography and echocardiography were unrevealing for a cardiac etiology contributing to her dyspnea.  I have reviewed the findings of the coronary CT angiogram of 8/18/2023 and the echocardiogram of 7/25/2023 in detail with the patient today.  I have reviewed the findings of the blood test report of 7/25/2023 in detail with the patient today.  I have instructed her to continue Crestor 20 mg daily for the time being.  I have advised her to follow-up with her endocrinologist regarding management of her thyroid condition and diabetes.  The importance of proper dietary habits, proper weight maintenance, and regular exercise as tolerated was discussed with the patient today.  I have asked the patient to call me if she should have any questions or problems pertaining to these matters, and especially if she should experience any concerning symptoms. I have otherwise asked her to return to the office for follow-up cardiac evaluation and blood pressure reassessment in 6 months, provided she remains clinically stable in the interim.

## 2023-08-23 NOTE — HISTORY OF PRESENT ILLNESS
[FreeTextEntry1] : Ms. Flora Mcclain presented to the office today for follow-up cardiac evaluation.  I last evaluated the patient in the office on 7/10/2023.  The patient is a 63-year-old female with a history of sinus tachycardia (autonomic dysfunction), mild mitral regurgitation, ventricular ectopy, palpitations, hypoxic respiratory failure (on home O2), a dyslipidemia, diabetes, hypothyroidism, gastroparesis, gastric polyps, GERD, colon polyps, Raynaud's disease, reflex sympathetic dystrophy, fibromyalgia, and osteoporosis.   The patient was admitted to [facility] on 3/14/2023 with acute hypoxic respiratory failure.  She was treated with intravenous steroid therapy and nebulizer therapy, and was presumed to have had a viral pneumonia (COVID-, Flu-).  She gradually clinically improved, and was transferred to a rehabilitation facility on 3/21/2023.  She has since been utilizing home oxygen therapy approximately 20 hours/day.  The patient presented to the emergency department at [facility] on 5/25/2023 for further evaluation of chest discomfort, dyspnea, and sinus tachycardia.  Chest CT angiography was negative for evidence of a pulmonary embolism, and the patient was released from the emergency department.  The patient reports having continued to experience dyspnea on exertion since her most recent hospitalization in March 2023, noting that she ambulates with a cane and utilizes home oxygen therapy.  She has not experienced any symptoms of chest discomfort in association with her activities.  She sleeps with her head elevated secondary to GERD, however, she describes experiencing orthopnea as well.  She has occasionally experienced very brief palpitations.  She has not experienced any episodes of syncope.  She has has not exhibited recurrence of lower extremity swelling over the past few months.  At the time of the patient's previous visit here on 7/10/2023, I instructed her to increase the dosage of labetalol to 400 mg twice daily in an effort to more optimally control her blood pressure.  She has been tolerating the increased dosage of this medication.  Coronary CT angiography performed on 8/18/2023 revealed a total coronary artery calcium score to be 1 (LAD).  Angiography revealed mild atherosclerosis involving the left anterior descending coronary artery, with the remainder of the coronary arteries being angiographically normal.    Echocardiography most recently performed on 7/25/2023 revealed normal cardiac chamber sizes with normal left ventricular wall thickness and wall motion.  Left ventricular systolic function was normal, with an estimated ejection fraction of 65%.  Mildly impaired diastolic relaxation of the left ventricle was exhibited.  Much regurgitation and mild tricuspid regurgitation were demonstrated.  The estimated pulmonary artery systolic pressure was 23 mmHg.  Exercise stress testing most recently performed on 10/8/20 revealed the patient to exhibit reduced exercise tolerance, without the inducement of cardiac symptoms. He was baseline sinus tachycardia with an exaggerated heart rate response to exercise, compatible with decreased cardiovascular conditioning. The study was negative for electrocardiographic evidence of myocardial ischemia. Rare ventricular premature contractions were exhibited.  A  24-hour ambulatory blood pressure monitoring study performed from 6/23/20 - 6/24/20 revealed the average blood pressure reading to be 122/74 with 80% of systolic readings and 8% of all diastolic readings being above the normal range.  Laboratory studies performed 7/25/2023 (on Crestor 10 mg daily) revealed cholesterol 144, triglycerides 123, HDL 55, and direct LDL 70.  The ALT and alkaline phosphatase levels were elevated at 49 and 211, respectively.  The AST level was normal at 35.  The total bilirubin level was normal at 0.2.  The BUN and creatinine were 20 and 0.68, respectively.  The potassium level was 4.4.  The glucose level was 131 and the hemoglobin A1c level was 6.0%.  The hemoglobin and hematocrit were 12.4 and 40.9, respectively.  The TSH level was less than 0.01 (I discussed these blood test results with the patient on 7/26/2023 and advised her to follow-up with her endocrinologist ASAP for an adjustment in her Synthroid dosage.  Previous History:  The patient reports having "coded" when she was hospitalized with sepsis in July of 2009, describing having exhibited hypotension at that time.  As far as risk factors for coronary artery disease are concerned, the patient has a history of a dyslipidemia. She does not have a history of hypertension or diabetes. She denies a history of cigarette smoking. She has a family history of coronary artery disease in both of her parents.  The patient was evaluated by an electrophysiologist, Dr. Mike Marie, for palpitations/flutters, for which she underwent cardiac rhythm event monitoring, beginning on 12/28/15. Her symptoms correlated with sinus rhythm on all (multiple) occasions.

## 2023-09-07 ENCOUNTER — APPOINTMENT (OUTPATIENT)
Age: 63
End: 2023-09-07
Payer: COMMERCIAL

## 2023-09-07 PROCEDURE — D2331: CPT

## 2023-09-07 PROCEDURE — D1110 PROPHYLAXIS - ADULT: CPT

## 2023-09-07 PROCEDURE — D2330: CPT

## 2023-10-05 ENCOUNTER — APPOINTMENT (OUTPATIENT)
Age: 63
End: 2023-10-05

## 2023-11-09 ENCOUNTER — APPOINTMENT (OUTPATIENT)
Dept: CARDIOLOGY | Facility: CLINIC | Age: 63
End: 2023-11-09
Payer: MEDICARE

## 2023-11-09 VITALS
DIASTOLIC BLOOD PRESSURE: 89 MMHG | HEART RATE: 100 BPM | HEIGHT: 61 IN | WEIGHT: 171 LBS | BODY MASS INDEX: 32.28 KG/M2 | SYSTOLIC BLOOD PRESSURE: 170 MMHG

## 2023-11-09 DIAGNOSIS — K21.9 GASTRO-ESOPHAGEAL REFLUX DISEASE W/OUT ESOPHAGITIS: ICD-10-CM

## 2023-11-09 PROCEDURE — 93000 ELECTROCARDIOGRAM COMPLETE: CPT | Mod: NC

## 2023-11-09 PROCEDURE — 99214 OFFICE O/P EST MOD 30 MIN: CPT

## 2024-01-01 NOTE — ED PROVIDER NOTE - PSYCHIATRIC, MLM
Alert and oriented to person, place, time/situation. normal mood and affect. no apparent risk to self or others. 2 seconds or less
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
## 2024-02-16 NOTE — ED ADULT NURSE NOTE - AS PAIN REST
Subjective     Shawn is DOL 51, 37.1 week FGR/SGA girl corrected to 44.3 weeks           Objective   Vital signs (last 24 hours):  Temp:  [36.4 °C-37.1 °C] 36.4 °C  Heart Rate:  [130-163] 163  Resp:  [36-65] 60  BP: (72-98)/(31-56) 89/53  SpO2:  [96 %-100 %] 99 %  FiO2 (%):  [100 %] 100 %      Active LDAs:  .       Active .       Name Placement date Placement time Site Days    Peripheral IV 02/02/24 24 G Right 02/02/24  1457  --  14    Peripheral IV 02/15/24 Left;Dorsal 02/15/24  0930  --  1                  Nutrition:  Dietary Orders (From admission, onward)       Start     Ordered    02/07/24 1800  Breast Milk - NICU patients ONLY  (Diet Peds)  8 times daily      Comments: PO ad mike, minimum 120ml/kg/day    Please alternate unfortified breastmilk with fortified breastmilk   Question Answer Comment   Human milk options: Enriched with powder    Concentration: 24 calories/ounce    Recipe: add 1 teaspoon Enfacare powder to 90 mL breast milk    Feeding route: PO (by mouth)        02/07/24 1715    01/30/24 1800  Infant formula  (Infant Feeding Orders)  8 times daily      Comments: As supplemental backup   Question Answer Comment   Formula: Enfacare    Concentrate to: 22 calories/ounce        01/30/24 1518    01/02/24 2231  [employer]  Once        Question:  .  Answer:  Yes    01/02/24 2230                    Labs:  Results from last 7 days   Lab Units 02/15/24  1007   WBC AUTO x10*3/uL 4.8*   HEMOGLOBIN g/dL 9.4   HEMATOCRIT % 26.0*   PLATELETS AUTO x10*3/uL 137*      Physical Exam  Constitutional:       General: Shawn is active and alert, calm and content, not irritable at rest today but vigorous/crying with IV retaping. Calms with pacifier.  HENT:      Head: Anterior fontanelle is flat.      Comments: Brachycephaly, sutures approximated     Right Ear: External ear normal, low set.      Left Ear: External ear normal, low set.      Nose: Nose normal.      Comments: Minimal upper airway congestion        Mouth/Throat:       Mouth: Mucous membranes are moist.      Pharynx: Oropharynx is clear.      Comments: Mouth is small; retrognathia/micrognathia. High arched palate  Eyes:      Conjunctiva/sclera: Conjunctivae normal. Downslanting palpebral fissures  Cardiovascular:      Rate and Rhythm: Normal rate and regular rhythm.      Pulses: Normal pulses.      Heart sounds: Normal heart sounds.   Pulmonary:      Effort: Pulmonary effort is normal.      Breath sounds: Normal breath sounds.      Comments: Hoarse cry  Abdominal:      General: Abdomen is flat. Bowel sounds are normal.      Palpations: Abdomen is soft.   Genitourinary:     General: Normal vulva.      Rectum: Normal.   Musculoskeletal:         General: Normal range of motion.      Cervical back: Normal range of motion.   Skin:     General: Skin is warm and dry.      Capillary Refill: Capillary refill takes 2 to 3 seconds.      Turgor: Normal.      Comments: Skin pinker today. Buttocks excoriated/reddened. Left hand PIV re-dressed, C/D/I, flushes   Neurological:      Primitive Reflexes: Suck normal.      Comments: Consoles with swaddle, holding, pacifier. Bilateral cortical thumbs      Over Past 24hrs:   Received PRBC 15ml/kg last evening and tolerated well  CT completed today     Weight: 2840g, up 170g     Respiratory Support: 0.04L NC  Masimo: 92-7-1-0-0     Events:  Apnea: 0  Bradycardia: none; Last   Desaturation: 0     Intake: 505ml (PRBC 40ml)  Output: 204ml  Feeding: MBM alternating with fortified MBM (with Enfacare powder to 24cal/oz), ad mike, 50-70ml x 8 feeds  Intake: 178ml/kg/day  % Oral Intake: 100%  Urine output: 3ml/kg/hr  Stool: 4  A-26.5cm     Family: Mom present with rounds. Asked her about NAIT testing which she states she has the paperwork but has not yet done. Mom is agreeable to Hematology coming by to discuss a bone marrow testing again. Mom is very happy that the PHTN diagnosis is resolved     Daisy Be APRN NNP-BC    0 (no pain/absence of nonverbal indicators of pain)

## 2024-03-06 NOTE — ED PROVIDER NOTE - CARDIAC, MLM
Name: Mary Kate Gresham  YOB: 1957  Gender: female  MRN: [id number]    What: Right knee injury  How: A twisting right knee injury stepping over a mini excavator  When: October 2023      HPI: Mary Kate Gresham is a 66 y.o. right-hand-dominant female seen for right knee problems.  I have seen her previously for left shoulder problems.  She she has had Synvisc 1 viscosupplementation in the glenohumeral joint left shoulder which did help.  I also injected her with cortisone and the left shoulder is doing better.  She has had previous left knee problems.  she denies any previous right knee issues.  She stepped over her 's mini excavator on October 2023 and sustained a twisting right knee injury.  The knee is sore painful and stiff.  It affects her quality of life.  She is unable to fully extend the right knee.  It feels unstable.    ROS/Meds/PSH/PMH/FH/SH: A ten system review of systems was performed and is negative other than what is in the HPI.   Tobacco:  reports that she quit smoking about 29 years ago. Her smoking use included cigarettes. She has never used smokeless tobacco.  There were no vitals taken for this visit.     Physical Examination:  She is an awake alert pleasant female ambulating with an antalgic gait  She has a restricted range of cervical spine motion with scapular and trapezial tenderness    The right shoulder has a well-healed deltopectoral incision  Active and passive forward elevation is 0-1 60  ER to 30  IR to T12  Biceps has good cosmetic appearance  She is neurovascularly intact    The left shoulder has 0 to 170 degrees of active and 0 to 170 degrees passive forward elevation.   Minimal overhead pain  Internal rotation is to T10.  External rotation is to 40 degrees at the side.   In the 90 degree abducted position 90 degrees of external and 90 degrees internal rotation  The AC joint is tender  SC joint is non-tender.   Greater tuberosity is 
tachycardia.  Heart sounds S1, S2.
No

## 2024-03-12 ENCOUNTER — APPOINTMENT (OUTPATIENT)
Dept: CARDIOLOGY | Facility: CLINIC | Age: 64
End: 2024-03-12
Payer: MEDICARE

## 2024-03-12 VITALS
SYSTOLIC BLOOD PRESSURE: 163 MMHG | OXYGEN SATURATION: 98 % | BODY MASS INDEX: 32.1 KG/M2 | HEIGHT: 61 IN | WEIGHT: 170 LBS | DIASTOLIC BLOOD PRESSURE: 89 MMHG | HEART RATE: 76 BPM

## 2024-03-12 DIAGNOSIS — I10 ESSENTIAL (PRIMARY) HYPERTENSION: ICD-10-CM

## 2024-03-12 DIAGNOSIS — Z01.810 ENCOUNTER FOR PREPROCEDURAL CARDIOVASCULAR EXAMINATION: ICD-10-CM

## 2024-03-12 DIAGNOSIS — I34.0 NONRHEUMATIC MITRAL (VALVE) INSUFFICIENCY: ICD-10-CM

## 2024-03-12 DIAGNOSIS — R06.09 OTHER FORMS OF DYSPNEA: ICD-10-CM

## 2024-03-12 DIAGNOSIS — I49.3 VENTRICULAR PREMATURE DEPOLARIZATION: ICD-10-CM

## 2024-03-12 PROCEDURE — 93000 ELECTROCARDIOGRAM COMPLETE: CPT

## 2024-03-12 PROCEDURE — 99215 OFFICE O/P EST HI 40 MIN: CPT

## 2024-03-12 PROCEDURE — G2211 COMPLEX E/M VISIT ADD ON: CPT

## 2024-03-12 RX ORDER — LABETALOL HYDROCHLORIDE 200 MG/1
200 TABLET, FILM COATED ORAL TWICE DAILY
Qty: 360 | Refills: 3 | Status: ACTIVE | COMMUNITY
Start: 2023-01-09 | End: 1900-01-01

## 2024-03-12 RX ORDER — OLMESARTAN MEDOXOMIL 40 MG/1
40 TABLET, FILM COATED ORAL
Qty: 90 | Refills: 3 | Status: ACTIVE | COMMUNITY
Start: 2023-01-20 | End: 1900-01-01

## 2024-03-12 NOTE — PHYSICAL EXAM
[General Appearance - In No Acute Distress] : no acute distress [No Oral Pallor] : no oral pallor [Normal Conjunctiva] : the conjunctiva exhibited no abnormalities [] : no respiratory distress [Respiration, Rhythm And Depth] : normal respiratory rhythm and effort [Auscultation Breath Sounds / Voice Sounds] : lungs were clear to auscultation bilaterally [Bowel Sounds] : normal bowel sounds [Abdomen Tenderness] : non-tender [Abnormal Walk] : normal gait [Not Palpable] : not palpable [Oriented To Time, Place, And Person] : oriented to person, place, and time [No Precordial Heave] : no precordial heave was noted [Tachycardia] : tachycardic [Rhythm Regular] : regular [Normal S2] : normal S2 [Normal S1] : normal S1 [No Gallop] : no gallop heard [I] : a grade 1 [2+] : left 2+ [1+] : left 1+ [No Abnormalities] : the abdominal aorta was not enlarged and no bruit was heard [No Pitting Edema] : no pitting edema present [FreeTextEntry1] : mild reddening of left foot with small blisters at tips of 2 toes [Apical Thrill] : no thrill palpable at the apex [Click] : no click [Pericardial Rub] : no pericardial rub [Right Carotid Bruit] : no bruit heard over the right carotid [Left Carotid Bruit] : no bruit heard over the left carotid

## 2024-03-12 NOTE — DISCUSSION/SUMMARY
[FreeTextEntry1] : Flora was hospitalized in March 2023 with acute hypoxic respiratory failure, and has since been utilizing home oxygen therapy 20 hours/day.  She has noted a similar degree of dyspnea on exertion.  Coronary CT angiography performed on 8/18/2023 did not reveal evidence for obstructive coronary artery disease.  Echocardiography performed on 7/25/2023 revealed normal left ventricular systolic function, mild mitral regurgitation, mild tricuspid regurgitation, and no evidence of pulmonary hypertension.  Her cardiac examination today is remarkable for a soft systolic ejection murmur, unchanged from her previous visit with me.  Lung examination reveals generalized diminished breath sounds.  She is not exhibiting lower extremity swelling today.  Her oxygen saturation is 98% on nasal cannula 2 L/min oxygen therapy,  Her electrocardiogram today reveals sinus rhythm with diminished voltage in leads V4-V6, essentially unchanged from her previous office tracing, allowing for lead placement variation.  I have instructed the patient to continue her antihypertensive therapy as presently prescribed for the time being, including labetalol 400 mg twice daily and Benicar 40 mg daily.  I am referring her for a 24-hour ambulatory blood pressure monitoring study to better assess her blood pressure control on these medications.  She is going to make arrangements to have this study performed through our office, and I will telephone her to discuss the findings, once the study has been completed.  I suspect that the patient's symptom of dyspnea is related to her pulmonary condition, especially noting that coronary CT angiography and echocardiography were unrevealing for a cardiac etiology contributing to her dyspnea.  I have reviewed the findings of the coronary CT angiogram of 8/18/2023 and the echocardiogram of 7/25/2023 in detail with the patient today.  I have reviewed the findings of the blood test report of 11/14/2023 in detail with the patient today.  I have instructed her to continue Crestor 10 mg daily for the time being.  She anticipates having follow-up blood testing performed through the office of her endocrinologist in the near future, and she will forward a copy of the results to the office for my review.  She is also going to be following up with the endocrinologist regarding management of hypothyroidism.  The importance of proper dietary habits, proper weight maintenance, and regular exercise as tolerated was discussed with the patient today.  I have asked the patient to call me if she should have any questions or problems pertaining to these matters, and especially if she should experience any concerning symptoms. I have otherwise asked her to return to the office for follow-up cardiac evaluation and blood pressure reassessment in 6 months, provided she remains clinically stable in the interim, and the findings on the upcoming 24-hour ambulatory blood pressure monitoring study do not warrant sooner evaluation and/or treatment. [EKG obtained to assist in diagnosis and management of assessed problem(s)] : EKG obtained to assist in diagnosis and management of assessed problem(s)

## 2024-03-12 NOTE — REVIEW OF SYSTEMS
[Negative] : Psychiatric [FreeTextEntry5] : See HPI [FreeTextEntry6] : See HPI [FreeTextEntry7] : See HPI

## 2024-03-12 NOTE — HISTORY OF PRESENT ILLNESS
[FreeTextEntry1] : Ms. Flora Mcclain presented to the office today for follow-up cardiac evaluation.  She was last evaluated in our office on 11/9/2023 by Cyndy Tejada NP for cardiac clearance to undergo colonoscopy, however, the patient was canceled secondary to the patient developing COVID-19 viral infection.  The patient is a 63-year-old female with a history of sinus tachycardia (autonomic dysfunction), mild mitral regurgitation, ventricular ectopy, palpitations, hypoxic respiratory failure (on home O2), a dyslipidemia, diabetes, hypothyroidism, gastroparesis, gastric polyps, GERD, colon polyps, Raynaud's disease, reflex sympathetic dystrophy, fibromyalgia, and osteoporosis.   The patient was admitted to [facility] on 3/14/2023 with acute hypoxic respiratory failure.  She was treated with intravenous steroid therapy and nebulizer therapy, and was presumed to have had a viral pneumonia (COVID-, Flu-).  She gradually clinically improved, and was transferred to a rehabilitation facility on 3/21/2023.  She has since been utilizing home oxygen therapy approximately 20 hours/day.  She is now following with Dr. Lafleur for her pulmonary care, and states that she has been diagnosed as having obstructive and restrictive lung disease.  The patient reports having continued to experience a similar degree of dyspnea on exertion since her previous visit here, noting that she ambulates with a cane and utilizes home oxygen therapy.  She has not experienced any symptoms of chest discomfort in association with her activities.  She sleeps with her head elevated secondary to GERD, however, she describes experiencing orthopnea as well.  She has occasionally experienced very brief palpitations.  She has not experienced any episodes of syncope.  She has not exhibited recurrence of lower extremity swelling over the past few months.  Coronary CT angiography performed on 8/18/2023 revealed a total coronary artery calcium score to be 1 (LAD).  Angiography revealed mild atherosclerosis involving the left anterior descending coronary artery, with the remainder of the coronary arteries being angiographically normal.    Echocardiography most recently performed on 7/25/2023 revealed normal cardiac chamber sizes with normal left ventricular wall thickness and wall motion.  Left ventricular systolic function was normal, with an estimated ejection fraction of 65%.  Mildly impaired diastolic relaxation of the left ventricle was exhibited.  Much regurgitation and mild tricuspid regurgitation were demonstrated.  The estimated pulmonary artery systolic pressure was 23 mmHg.  Exercise stress testing most recently performed on 10/8/20 revealed the patient to exhibit reduced exercise tolerance, without the inducement of cardiac symptoms. He was baseline sinus tachycardia with an exaggerated heart rate response to exercise, compatible with decreased cardiovascular conditioning. The study was negative for electrocardiographic evidence of myocardial ischemia. Rare ventricular premature contractions were exhibited.  A  24-hour ambulatory blood pressure monitoring study performed from 6/23/20 - 6/24/20 revealed the average blood pressure reading to be 122/74 with 80% of systolic readings and 8% of all diastolic readings being above the normal range.  Laboratory studies performed 11/4/2023 (on Crestor 10 mg daily revealed cholesterol 161, triglycerides 180, HDL 56, and calculated LDL 75.  The liver chemistries were normal.  The BUN and creatinine were 25 and 0.8, respectively.  The potassium level was 4.1.  The sodium level was 145.  The glucose level was 128 and the hemoglobin A1c level was 5.6%.  The hemoglobin and hematocrit were 11.2 and 34.8, respectively.  The TSH level is 12.400.  Previous History:  The patient reports having "coded" when she was hospitalized with sepsis in July of 2009, describing having exhibited hypotension at that time.  As far as risk factors for coronary artery disease are concerned, the patient has a history of a dyslipidemia. She does not have a history of hypertension or diabetes. She denies a history of cigarette smoking. She has a family history of coronary artery disease in both of her parents.  The patient was evaluated by an electrophysiologist, Dr. Mike Marie, for palpitations/flutters, for which she underwent cardiac rhythm event monitoring, beginning on 12/28/15. Her symptoms correlated with sinus rhythm on all (multiple) occasions.

## 2024-04-04 ENCOUNTER — APPOINTMENT (OUTPATIENT)
Dept: CARDIOLOGY | Facility: CLINIC | Age: 64
End: 2024-04-04
Payer: MEDICARE

## 2024-04-04 PROCEDURE — 93790 AMBL BP MNTR W/SW I&R: CPT

## 2024-04-05 ENCOUNTER — NON-APPOINTMENT (OUTPATIENT)
Age: 64
End: 2024-04-05

## 2024-04-25 ENCOUNTER — OUTPATIENT (OUTPATIENT)
Dept: OUTPATIENT SERVICES | Facility: HOSPITAL | Age: 64
LOS: 1 days | End: 2024-04-25
Payer: MEDICARE

## 2024-04-25 ENCOUNTER — TRANSCRIPTION ENCOUNTER (OUTPATIENT)
Age: 64
End: 2024-04-25

## 2024-04-25 DIAGNOSIS — Z90.49 ACQUIRED ABSENCE OF OTHER SPECIFIED PARTS OF DIGESTIVE TRACT: Chronic | ICD-10-CM

## 2024-04-25 DIAGNOSIS — K21.9 GASTRO-ESOPHAGEAL REFLUX DISEASE WITHOUT ESOPHAGITIS: ICD-10-CM

## 2024-04-25 DIAGNOSIS — Z98.89 OTHER SPECIFIED POSTPROCEDURAL STATES: Chronic | ICD-10-CM

## 2024-04-25 LAB — GLUCOSE BLDC GLUCOMTR-MCNC: 122 MG/DL — HIGH (ref 70–99)

## 2024-04-25 PROCEDURE — 88312 SPECIAL STAINS GROUP 1: CPT | Mod: 26

## 2024-04-25 PROCEDURE — 88313 SPECIAL STAINS GROUP 2: CPT | Mod: 26

## 2024-04-25 PROCEDURE — 88305 TISSUE EXAM BY PATHOLOGIST: CPT | Mod: 26

## 2024-04-25 DEVICE — ESOPHAGEAL BALLOON CATH CRE FIXED WIRE 15-16.5-18MM: Type: IMPLANTABLE DEVICE | Status: FUNCTIONAL

## 2024-04-25 DEVICE — CLIP RESOLUTION 360 235CM: Type: IMPLANTABLE DEVICE | Status: FUNCTIONAL

## 2024-04-25 DEVICE — ESOPHAGEAL BALLOON CATH CRE FIXED WIRE 10-11-12MM: Type: IMPLANTABLE DEVICE | Status: FUNCTIONAL

## 2024-04-25 DEVICE — HEMOSPRAY HEMOSTAT ENDO 7F: Type: IMPLANTABLE DEVICE | Status: FUNCTIONAL

## 2024-04-25 DEVICE — ESOPHAGEAL BALLOON CATH CRE FIXED WIRE 6-7-8MM: Type: IMPLANTABLE DEVICE | Status: FUNCTIONAL

## 2024-04-25 DEVICE — ESOPHAGEAL BALLOON CATH CRE FIXED WIRE 8-9-10MM: Type: IMPLANTABLE DEVICE | Status: FUNCTIONAL

## 2024-04-25 DEVICE — ESOPHAGEAL BALLOON CATH CRE FIXED WIRE 12-13.5-15MM: Type: IMPLANTABLE DEVICE | Status: FUNCTIONAL

## 2024-04-26 LAB — SURGICAL PATHOLOGY STUDY: SIGNIFICANT CHANGE UP

## 2024-04-26 PROCEDURE — 45380 COLONOSCOPY AND BIOPSY: CPT | Mod: PT

## 2024-04-26 PROCEDURE — 88305 TISSUE EXAM BY PATHOLOGIST: CPT

## 2024-04-26 PROCEDURE — 43239 EGD BIOPSY SINGLE/MULTIPLE: CPT

## 2024-04-26 PROCEDURE — 88313 SPECIAL STAINS GROUP 2: CPT

## 2024-04-26 PROCEDURE — 88312 SPECIAL STAINS GROUP 1: CPT

## 2024-04-26 PROCEDURE — 82962 GLUCOSE BLOOD TEST: CPT

## 2024-05-02 NOTE — CARDIOLOGY SUMMARY
[de-identified] : 3/12/24 -sinus bradycardia at a rate of 78 bpm.  Baseline artifact.  Nonspecific diminished voltage in leads V4-V6. Statement Selected

## 2024-05-16 ENCOUNTER — RX RENEWAL (OUTPATIENT)
Age: 64
End: 2024-05-16

## 2024-05-16 RX ORDER — ROSUVASTATIN CALCIUM 10 MG/1
10 TABLET, FILM COATED ORAL
Qty: 90 | Refills: 3 | Status: ACTIVE | COMMUNITY
Start: 2022-12-20 | End: 1900-01-01

## 2024-07-21 ENCOUNTER — NON-APPOINTMENT (OUTPATIENT)
Age: 64
End: 2024-07-21

## 2024-09-04 ENCOUNTER — APPOINTMENT (OUTPATIENT)
Dept: CARDIOLOGY | Facility: CLINIC | Age: 64
End: 2024-09-04
Payer: MEDICARE

## 2024-09-04 VITALS
SYSTOLIC BLOOD PRESSURE: 129 MMHG | BODY MASS INDEX: 30.58 KG/M2 | HEART RATE: 70 BPM | WEIGHT: 162 LBS | HEIGHT: 61 IN | OXYGEN SATURATION: 98 % | DIASTOLIC BLOOD PRESSURE: 80 MMHG

## 2024-09-04 DIAGNOSIS — I49.3 VENTRICULAR PREMATURE DEPOLARIZATION: ICD-10-CM

## 2024-09-04 DIAGNOSIS — E78.5 HYPERLIPIDEMIA, UNSPECIFIED: ICD-10-CM

## 2024-09-04 DIAGNOSIS — I10 ESSENTIAL (PRIMARY) HYPERTENSION: ICD-10-CM

## 2024-09-04 DIAGNOSIS — R06.09 OTHER FORMS OF DYSPNEA: ICD-10-CM

## 2024-09-04 PROCEDURE — 93000 ELECTROCARDIOGRAM COMPLETE: CPT

## 2024-09-04 PROCEDURE — 99215 OFFICE O/P EST HI 40 MIN: CPT

## 2024-09-04 PROCEDURE — G2211 COMPLEX E/M VISIT ADD ON: CPT

## 2024-09-04 NOTE — DISCUSSION/SUMMARY
[EKG obtained to assist in diagnosis and management of assessed problem(s)] : EKG obtained to assist in diagnosis and management of assessed problem(s) [FreeTextEntry1] : Flora was hospitalized in March 2023 with acute hypoxic respiratory failure, and has since been utilizing home oxygen therapy 20 hours/day.  She has been stable from a cardiac symptomatic standpoint since her previous visit here on 3/12/2024.  Specifically, she has noted a similar degree of dyspnea on exertion.  Coronary CT angiography performed on 8/18/2023 did not reveal evidence for obstructive coronary artery disease.  Echocardiography performed on 7/25/2023 revealed normal left ventricular systolic function, mild mitral regurgitation, mild tricuspid regurgitation, and no evidence of pulmonary hypertension.  Her cardiac examination today is remarkable for a soft systolic ejection murmur, unchanged from her previous visit with me.  Lung examination reveals generalized diminished breath sounds.  She is not exhibiting lower extremity swelling today.  Her blood pressure reading today is satisfactory.  Her oxygen saturation is 98% on nasal cannula 2 L/min oxygen therapy.  Her electrocardiogram today reveals sinus rhythm with nonspecific diminished voltage in leads V3-V6 and nonspecific T wave abnormalities in leads III and aVF, essentially unchanged from her previous office tracing, allowing for lead placement variation.  I have reviewed the findings of the 24-hour ambulatory blood pressure monitoring study of 4/4/2024 in detail with the patient today.  As her blood pressure reading in the office today satisfactory, I have instructed the patient to continue her antihypertensive therapy as presently prescribed for the time being, including labetalol 400 mg twice daily and Benicar 40 mg daily.  I suspect that the patient's symptom of dyspnea is related to her pulmonary condition, especially noting that coronary CT angiography and echocardiography were unrevealing for a cardiac etiology contributing to her dyspnea.  I have reviewed the findings of the coronary CT angiogram of 8/18/2023 and the echocardiogram of 7/25/2023 in detail with the patient today.  I have reviewed the findings of the blood test report of 11/14/2023 in detail with the patient today.  I have instructed her to continue Crestor 10 mg daily for the time being.  I have electronically prescribed a renewal for Crestor 10 mg daily to the patient's mail order pharmacy for her today.  She anticipates having follow-up blood testing performed through the office of her endocrinologist in the near future, and she will forward a copy of the results to the office for my review.  She is also going to be following up with the endocrinologist regarding management of hypothyroidism.  The importance of proper dietary habits, proper weight maintenance, and regular exercise as tolerated was discussed with the patient today.  I have asked the patient to call me if she should have any questions or problems pertaining to these matters, and especially if she should experience any concerning symptoms. I have otherwise asked her to return to the office for follow-up cardiac evaluation and blood pressure reassessment in 6 months, provided she remains clinically stable in the interim.

## 2024-09-04 NOTE — HISTORY OF PRESENT ILLNESS
[FreeTextEntry1] : Ms. Flora Mcclain presented to the office today for follow-up cardiac evaluation.  I last evaluated the patient in the office on 3/12/2024.  The patient is a 64-year-old female with a history of sinus tachycardia (autonomic dysfunction), mild mitral regurgitation, ventricular ectopy, palpitations, hypoxic respiratory failure (on home O2), a dyslipidemia, diabetes, hypothyroidism, gastroparesis, gastric polyps, GERD, colon polyps, Raynaud's disease, reflex sympathetic dystrophy, fibromyalgia, and osteoporosis.   The patient has been stable from a cardiac symptomatic standpoint since her previous visit on 3/12/2024.  Specifically, she reports having continued to experience a similar degree of dyspnea on exertion since her previous visit here, noting that she ambulates with a cane and utilizes home oxygen therapy.  She has not experienced any symptoms of chest discomfort in association with her activities.  She sleeps with her head elevated secondary to GERD, however, she describes experiencing orthopnea as well.  She has not recently experienced recurrence of palpitations.  She has not experienced any episodes of syncope.  She has not exhibited recurrence of lower extremity swelling over the past several months months.  The patient was admitted to [facility] on 3/14/2023 with acute hypoxic respiratory failure.  She was treated with intravenous steroid therapy and nebulizer therapy, and was presumed to have had a viral pneumonia (COVID-, Flu-).  She gradually clinically improved, and was transferred to a rehabilitation facility on 3/21/2023.  She has since been utilizing home oxygen therapy approximately 20 hours/day.  She is now following with Dr. Lafleur for her pulmonary care, and states that she has been diagnosed as having obstructive and restrictive lung disease.  As far as risk factors for coronary artery disease are concerned, the patient has a history of a dyslipidemia. She does not have a history of hypertension or diabetes. She denies a history of cigarette smoking. She has a family history of coronary artery disease in both of her parents.  Laboratory studies performed 11/4/2023 (on Crestor 10 mg daily revealed cholesterol 161, triglycerides 180, HDL 56, and calculated LDL 75.  The liver chemistries were normal.  The BUN and creatinine were 25 and 0.8, respectively.  The potassium level was 4.1.  The sodium level was 145.  The glucose level was 128 and the hemoglobin A1c level was 5.6%.  The hemoglobin and hematocrit were 11.2 and 34.8, respectively.  The TSH level was 12.400.  Coronary CT angiography performed on 8/18/2023 revealed a total coronary artery calcium score to be 1 (LAD).  Angiography revealed mild atherosclerosis involving the left anterior descending coronary artery, with the remainder of the coronary arteries being angiographically normal.    Echocardiography most recently performed on 7/25/2023 revealed normal cardiac chamber sizes with normal left ventricular wall thickness and wall motion.  Left ventricular systolic function was normal, with an estimated ejection fraction of 65%.  Mildly impaired diastolic relaxation of the left ventricle was exhibited.  Mild mitral regurgitation and mild tricuspid regurgitation were demonstrated.  The estimated pulmonary artery systolic pressure was 23 mmHg.  Exercise stress testing most recently performed on 10/8/20 revealed the patient to exhibit reduced exercise tolerance, without the inducement of cardiac symptoms. He was baseline sinus tachycardia with an exaggerated heart rate response to exercise, compatible with decreased cardiovascular conditioning. The study was negative for electrocardiographic evidence of myocardial ischemia. Rare ventricular premature contractions were exhibited.  A  24-hour ambulatory blood pressure monitoring study most recently performed on 4/4/2024 revealed the average reading to be 132/72 with 31% of systolic readings being in the elevated range and 8% of diastolic readings being in the elevated range.  No changes were made in the patient's antihypertensive medication regimen in response to this study.  Previous History:  The patient reports having "coded" when she was hospitalized with sepsis in July of 2009, describing having exhibited hypotension at that time.  The patient was evaluated by an electrophysiologist, Dr. Mike Marie, for palpitations/flutters, for which she underwent cardiac rhythm event monitoring, beginning on 12/28/15. Her symptoms correlated with sinus rhythm on all (multiple) occasions.

## 2024-09-04 NOTE — CARDIOLOGY SUMMARY
[de-identified] : 9/4/24 -sinus rhythm at a rate of 67 bpm.  Nonspecific diminished voltage in leads V3-V6.  Nonspecific T wave abnormalities in leads III and aVF.

## 2025-01-17 ENCOUNTER — NON-APPOINTMENT (OUTPATIENT)
Age: 65
End: 2025-01-17

## 2025-03-04 ENCOUNTER — APPOINTMENT (OUTPATIENT)
Dept: CARDIOLOGY | Facility: CLINIC | Age: 65
End: 2025-03-04

## 2025-03-10 ENCOUNTER — RX RENEWAL (OUTPATIENT)
Age: 65
End: 2025-03-10

## (undated) DEVICE — FORCEP RADIAL JAW 4 W NDL 2.2MM 2.8MM 160CM YELLOW DISP

## (undated) DEVICE — WARMING BLANKET LOWER ADULT

## (undated) DEVICE — TUBING SUCTION CONN 6FT STERILE

## (undated) DEVICE — VALVE BIOPSY

## (undated) DEVICE — SUCTION YANKAUER TAPERED BULBOUS NO VENT

## (undated) DEVICE — TUBING CANNULA SALTER LABS NASAL ADULT 7FT

## (undated) DEVICE — NDL INJ SCLERO INTERJECT 23G

## (undated) DEVICE — TUBING IV SET SECONDARY 34"

## (undated) DEVICE — CATH IV SAFE BC 20G X 1.16" (PINK)

## (undated) DEVICE — MARKER ENDO SPOT EX

## (undated) DEVICE — BITE BLOCK MAXI RUBBER STAMP

## (undated) DEVICE — Device

## (undated) DEVICE — PLV-SCD MACHINE: Type: DURABLE MEDICAL EQUIPMENT

## (undated) DEVICE — MASK LRG MED AND HIGH O2 CONC M TO M 10FT

## (undated) DEVICE — PACK IV START WITH CHG

## (undated) DEVICE — VENODYNE/SCD SLEEVE CALF MEDIUM

## (undated) DEVICE — POLY TRAP ETRAP

## (undated) DEVICE — MASK O2 NON REBREATH 3IN1 ADULT

## (undated) DEVICE — SUT HEWSON RETRIEVER

## (undated) DEVICE — SOL IRR POUR NS 0.9% 1000ML

## (undated) DEVICE — BRUSH COLONOSCOPY CYTOLOGY

## (undated) DEVICE — FORMALIN CUPS 10% BUFFERED

## (undated) DEVICE — CANISTER SUCTION 1200CC 10/SL

## (undated) DEVICE — DRSG CURITY GAUZE SPONGE 4 X 4" 12-PLY

## (undated) DEVICE — CATH IV SAFE BC 22G X 1" (BLUE)

## (undated) DEVICE — BRUSH CYTO ENDO

## (undated) DEVICE — ENDOCUFF VISION SZ 2 LG GRN

## (undated) DEVICE — CATH ELECHMSTAT  INJ 7FR 210CM

## (undated) DEVICE — SYR LUER SLIP TIP 30CC

## (undated) DEVICE — SOL IRR POUR H2O 1000ML

## (undated) DEVICE — TUBING IV SET SECOND 34" W/O LOK-BLUNT

## (undated) DEVICE — SYR IV POSIFLUSH NS 3ML 30/TY

## (undated) DEVICE — CATH ELCTR GLIDE PRB 7FR

## (undated) DEVICE — SOL IRR POUR H2O 500ML

## (undated) DEVICE — SOL IRR BAG H2O 1000ML

## (undated) DEVICE — SENSOR O2 FINGER XL ADULT 24/BX 6BX/CA

## (undated) DEVICE — FORCEP RADIAL JAW 4 JUMBO 2.8MM 3.2MM 240CM ORANGE DISP

## (undated) DEVICE — SYR LUER SLIP TIP 50CC

## (undated) DEVICE — TUBE O2 SUPL CRUSH RESIS CONN SOUTHSIDE ONLY

## (undated) DEVICE — STERIS DEFENDO 3-PIECE KIT (AIR/WATER, SUCTION & BIOPSY VALVES)

## (undated) DEVICE — TUBING IV SET GRAVITY 3Y 100" MACRO

## (undated) DEVICE — ENDOCUFF VISION SZ 3 SM PRPL

## (undated) DEVICE — TUBE RECTAL 24FR

## (undated) DEVICE — FORCEP RADIAL JAW 4 W NDL 2.4MM 2.8MM 240CM ORANGE DISP

## (undated) DEVICE — SNARE POLYP SENS 27MM 240CM

## (undated) DEVICE — RETRIEVER ROTH NET PLATINUM-UNIVERSAL

## (undated) DEVICE — SET IV PUMP BLOOD 1VALVE 180FILTER NON-DEHP

## (undated) DEVICE — SNARE LRG

## (undated) DEVICE — ELCTR GROUNDING PAD ADULT COVIDIEN

## (undated) DEVICE — SYR LUER LOK 30CC

## (undated) DEVICE — TRAP QUICK CATCH  SINGL CHAMBER